# Patient Record
Sex: MALE | Race: WHITE | NOT HISPANIC OR LATINO | Employment: FULL TIME | URBAN - METROPOLITAN AREA
[De-identification: names, ages, dates, MRNs, and addresses within clinical notes are randomized per-mention and may not be internally consistent; named-entity substitution may affect disease eponyms.]

---

## 2017-03-29 ENCOUNTER — TRANSCRIBE ORDERS (OUTPATIENT)
Dept: ADMINISTRATIVE | Facility: HOSPITAL | Age: 72
End: 2017-03-29

## 2017-03-29 DIAGNOSIS — S06.0X0A: Primary | ICD-10-CM

## 2017-04-14 ENCOUNTER — HOSPITAL ENCOUNTER (OUTPATIENT)
Dept: MRI IMAGING | Facility: HOSPITAL | Age: 72
Discharge: HOME | End: 2017-04-14
Payer: COMMERCIAL

## 2017-04-14 DIAGNOSIS — S06.0X0A: ICD-10-CM

## 2017-04-14 PROCEDURE — 70551 MRI BRAIN STEM W/O DYE: CPT

## 2017-08-01 ENCOUNTER — ALLSCRIPTS OFFICE VISIT (OUTPATIENT)
Dept: OTHER | Facility: OTHER | Age: 72
End: 2017-08-01

## 2017-08-18 ENCOUNTER — APPOINTMENT (EMERGENCY)
Dept: CT IMAGING | Facility: HOSPITAL | Age: 72
End: 2017-08-18
Payer: COMMERCIAL

## 2017-08-18 ENCOUNTER — HOSPITAL ENCOUNTER (EMERGENCY)
Facility: HOSPITAL | Age: 72
Discharge: HOME | End: 2017-08-18
Attending: EMERGENCY MEDICINE
Payer: COMMERCIAL

## 2017-08-18 VITALS
SYSTOLIC BLOOD PRESSURE: 158 MMHG | DIASTOLIC BLOOD PRESSURE: 96 MMHG | TEMPERATURE: 97.6 F | WEIGHT: 195 LBS | RESPIRATION RATE: 20 BRPM | BODY MASS INDEX: 27.2 KG/M2 | HEART RATE: 62 BPM | OXYGEN SATURATION: 98 %

## 2017-08-18 DIAGNOSIS — G43.109 MIGRAINE WITH AURA: Primary | ICD-10-CM

## 2017-08-18 LAB
ALBUMIN SERPL BCP-MCNC: 3.8 G/DL (ref 3.5–5)
ALP SERPL-CCNC: 149 U/L (ref 46–116)
ALT SERPL W P-5'-P-CCNC: 28 U/L (ref 12–78)
ANION GAP SERPL CALCULATED.3IONS-SCNC: 11 MMOL/L (ref 4–13)
AST SERPL W P-5'-P-CCNC: 15 U/L (ref 5–45)
BASOPHILS # BLD AUTO: 0.06 THOUSANDS/ΜL (ref 0–0.1)
BASOPHILS NFR BLD AUTO: 1 % (ref 0–1)
BILIRUB SERPL-MCNC: 1.1 MG/DL (ref 0.2–1)
BUN SERPL-MCNC: 10 MG/DL (ref 5–25)
CALCIUM SERPL-MCNC: 9.6 MG/DL (ref 8.3–10.1)
CHLORIDE SERPL-SCNC: 103 MMOL/L (ref 100–108)
CO2 SERPL-SCNC: 26 MMOL/L (ref 21–32)
CREAT SERPL-MCNC: 1.01 MG/DL (ref 0.6–1.3)
EOSINOPHIL # BLD AUTO: 0.34 THOUSAND/ΜL (ref 0–0.61)
EOSINOPHIL NFR BLD AUTO: 4 % (ref 0–6)
ERYTHROCYTE [DISTWIDTH] IN BLOOD BY AUTOMATED COUNT: 11.9 % (ref 11.6–15.1)
GFR SERPL CREATININE-BSD FRML MDRD: 75 ML/MIN/1.73SQ M
GLUCOSE SERPL-MCNC: 93 MG/DL (ref 65–140)
HCT VFR BLD AUTO: 44.5 % (ref 36.5–49.3)
HGB BLD-MCNC: 15.4 G/DL (ref 12–17)
LYMPHOCYTES # BLD AUTO: 1.84 THOUSANDS/ΜL (ref 0.6–4.47)
LYMPHOCYTES NFR BLD AUTO: 24 % (ref 14–44)
MCH RBC QN AUTO: 32.3 PG (ref 26.8–34.3)
MCHC RBC AUTO-ENTMCNC: 34.6 G/DL (ref 31.4–37.4)
MCV RBC AUTO: 93 FL (ref 82–98)
MONOCYTES # BLD AUTO: 0.69 THOUSAND/ΜL (ref 0.17–1.22)
MONOCYTES NFR BLD AUTO: 9 % (ref 4–12)
NEUTROPHILS # BLD AUTO: 4.73 THOUSANDS/ΜL (ref 1.85–7.62)
NEUTS SEG NFR BLD AUTO: 62 % (ref 43–75)
NRBC BLD AUTO-RTO: 0 /100 WBCS
PLATELET # BLD AUTO: 186 THOUSANDS/UL (ref 149–390)
PMV BLD AUTO: 9.9 FL (ref 8.9–12.7)
POTASSIUM SERPL-SCNC: 4 MMOL/L (ref 3.5–5.3)
PROT SERPL-MCNC: 7.9 G/DL (ref 6.4–8.2)
RBC # BLD AUTO: 4.77 MILLION/UL (ref 3.88–5.62)
SODIUM SERPL-SCNC: 140 MMOL/L (ref 136–145)
TROPONIN I SERPL-MCNC: <0.02 NG/ML
WBC # BLD AUTO: 7.69 THOUSAND/UL (ref 4.31–10.16)

## 2017-08-18 PROCEDURE — 99285 EMERGENCY DEPT VISIT HI MDM: CPT

## 2017-08-18 PROCEDURE — 36415 COLL VENOUS BLD VENIPUNCTURE: CPT

## 2017-08-18 PROCEDURE — 96375 TX/PRO/DX INJ NEW DRUG ADDON: CPT

## 2017-08-18 PROCEDURE — 85025 COMPLETE CBC W/AUTO DIFF WBC: CPT | Performed by: EMERGENCY MEDICINE

## 2017-08-18 PROCEDURE — 70450 CT HEAD/BRAIN W/O DYE: CPT

## 2017-08-18 PROCEDURE — 80053 COMPREHEN METABOLIC PANEL: CPT | Performed by: EMERGENCY MEDICINE

## 2017-08-18 PROCEDURE — 93005 ELECTROCARDIOGRAM TRACING: CPT | Performed by: EMERGENCY MEDICINE

## 2017-08-18 PROCEDURE — 96361 HYDRATE IV INFUSION ADD-ON: CPT

## 2017-08-18 PROCEDURE — 96374 THER/PROPH/DIAG INJ IV PUSH: CPT

## 2017-08-18 PROCEDURE — 84484 ASSAY OF TROPONIN QUANT: CPT | Performed by: EMERGENCY MEDICINE

## 2017-08-18 RX ORDER — DEXTROAMPHETAMINE SACCHARATE, AMPHETAMINE ASPARTATE, DEXTROAMPHETAMINE SULFATE AND AMPHETAMINE SULFATE 2.5; 2.5; 2.5; 2.5 MG/1; MG/1; MG/1; MG/1
10 TABLET ORAL
COMMUNITY
End: 2018-05-01

## 2017-08-18 RX ORDER — METOCLOPRAMIDE HYDROCHLORIDE 5 MG/ML
10 INJECTION INTRAMUSCULAR; INTRAVENOUS ONCE
Status: COMPLETED | OUTPATIENT
Start: 2017-08-18 | End: 2017-08-18

## 2017-08-18 RX ORDER — DIPHENHYDRAMINE HYDROCHLORIDE 50 MG/ML
12.5 INJECTION INTRAMUSCULAR; INTRAVENOUS ONCE
Status: COMPLETED | OUTPATIENT
Start: 2017-08-18 | End: 2017-08-18

## 2017-08-18 RX ORDER — DONEPEZIL HYDROCHLORIDE 10 MG/1
10 TABLET, FILM COATED ORAL
COMMUNITY
End: 2018-05-01

## 2017-08-18 RX ORDER — ONDANSETRON 4 MG/1
4 TABLET, ORALLY DISINTEGRATING ORAL EVERY 6 HOURS PRN
COMMUNITY
End: 2018-05-01

## 2017-08-18 RX ADMIN — METOCLOPRAMIDE 10 MG: 5 INJECTION, SOLUTION INTRAMUSCULAR; INTRAVENOUS at 12:46

## 2017-08-18 RX ADMIN — SODIUM CHLORIDE 1000 ML: 0.9 INJECTION, SOLUTION INTRAVENOUS at 12:46

## 2017-08-18 RX ADMIN — DIPHENHYDRAMINE HYDROCHLORIDE 12.5 MG: 50 INJECTION, SOLUTION INTRAMUSCULAR; INTRAVENOUS at 12:46

## 2017-08-22 LAB
ATRIAL RATE: 64 BPM
P AXIS: 46 DEGREES
PR INTERVAL: 194 MS
QRS AXIS: 29 DEGREES
QRSD INTERVAL: 76 MS
QT INTERVAL: 416 MS
QTC INTERVAL: 429 MS
T WAVE AXIS: 59 DEGREES
VENTRICULAR RATE: 64 BPM

## 2017-08-24 ENCOUNTER — GENERIC CONVERSION - ENCOUNTER (OUTPATIENT)
Dept: OTHER | Facility: OTHER | Age: 72
End: 2017-08-24

## 2017-08-25 ENCOUNTER — ALLSCRIPTS OFFICE VISIT (OUTPATIENT)
Dept: OTHER | Facility: OTHER | Age: 72
End: 2017-08-25

## 2017-09-14 ENCOUNTER — GENERIC CONVERSION - ENCOUNTER (OUTPATIENT)
Dept: OTHER | Facility: OTHER | Age: 72
End: 2017-09-14

## 2017-10-24 ENCOUNTER — ALLSCRIPTS OFFICE VISIT (OUTPATIENT)
Dept: OTHER | Facility: OTHER | Age: 72
End: 2017-10-24

## 2017-12-04 ENCOUNTER — ALLSCRIPTS OFFICE VISIT (OUTPATIENT)
Dept: OTHER | Facility: OTHER | Age: 72
End: 2017-12-04
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
## 2017-12-13 ENCOUNTER — TRANSCRIBE ORDERS (OUTPATIENT)
Dept: ADMINISTRATIVE | Facility: HOSPITAL | Age: 72
End: 2017-12-13

## 2017-12-13 DIAGNOSIS — G47.00 INSOMNIA WITH SLEEP APNEA: ICD-10-CM

## 2017-12-13 DIAGNOSIS — G47.30 INSOMNIA WITH SLEEP APNEA: ICD-10-CM

## 2017-12-13 DIAGNOSIS — G47.00 PERSISTENT DISORDER OF INITIATING OR MAINTAINING SLEEP: Primary | ICD-10-CM

## 2017-12-22 ENCOUNTER — ALLSCRIPTS OFFICE VISIT (OUTPATIENT)
Dept: OTHER | Facility: OTHER | Age: 72
End: 2017-12-22

## 2018-01-09 NOTE — MISCELLANEOUS
Message  Dr Oswaldo Salgado ([phone]) 1  reach out to me today on the behalf of the patient  She indicates that he has been having more anxiety recently as well as overstimulation from sounds and other external stimuli  Patient has a low tolerance for distressed and also has some dizziness at times when he closes his eyes that has been preceding the start of the medication with me  She is concerned that some of this is not her logic that may be related to his trauma and psychiatric illness  I thanked her for the conversation and asked that she contact me again if any further concerns arise and I appreciate the dialogue being opened  Plan: I will reach out to the patient and discuss possible options with medication adjustments  1 Amended By: Alexandrea Jones;  Sep 14 2017 1:35 PM EST    Signatures   Electronically signed by : Alexandrea Jones DO; Sep 14 2017  1:35PM EST                       (Author)
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
## 2018-01-13 NOTE — MISCELLANEOUS
Message  LVM for Dr Antoni Marsh (sp?) at [phone]      Plan  Acute adjustment disorder with mixed anxiety and depressed mood    · FLUoxetine HCl - 10 MG Oral Capsule (PROzac); TAKE 1 CAPSULES DAILY   · Prazosin HCl - 1 MG Oral Capsule; Take 1 capsule nightly  May increase dose  every 3-4 nights by 1 capsule   Do not exceed 3 capsules (3mg) nightly    Signatures   Electronically signed by : Dannial Dandy, DO; Sep 13 2017  3:04PM EST                       (Author)

## 2018-01-14 VITALS
DIASTOLIC BLOOD PRESSURE: 102 MMHG | HEART RATE: 63 BPM | WEIGHT: 199 LBS | SYSTOLIC BLOOD PRESSURE: 155 MMHG | BODY MASS INDEX: 28.55 KG/M2

## 2018-01-14 VITALS — DIASTOLIC BLOOD PRESSURE: 108 MMHG | OXYGEN SATURATION: 99 % | SYSTOLIC BLOOD PRESSURE: 174 MMHG | HEART RATE: 61 BPM

## 2018-01-14 VITALS — DIASTOLIC BLOOD PRESSURE: 89 MMHG | SYSTOLIC BLOOD PRESSURE: 143 MMHG | HEART RATE: 69 BPM

## 2018-01-16 NOTE — MISCELLANEOUS
Message  Sebastien medication he has been having some more anxiety attacks and anxiety overall lately  He says that he would like to increase the Prozac and we agreed to this plan  He is only taking prazosin 1 mg nightly  He did not increase the dose  He will not increase the same time with medication changes but will maybe do that before next visit  He has a trip to [place] coming up and he wanted to make sure he had good anxiety support  Thus his PCP with [facility] prescribed him low-dose Xanax  I talked about side effects risks interactions and benefits of this medication is well tolerated LD, sedation falls and other issues  Patient is in understanding of the risks with the medication and says he will use a judiciously for panic attack concerns  No side effects or issues otherwise the    P increased Prozac 20 mg daily  After about 1 week patient may increase prazosin as prescribed   Patient will take Xanax for panic attacks prescribed by other provider as needed      Plan  Acute adjustment disorder with mixed anxiety and depressed mood    · From  FLUoxetine HCl - 10 MG Oral Capsule TAKE 1 CAPSULES DAILY To  FLUoxetine HCl - 20 MG Oral Capsule (PROzac) take 1 capsule daily    Signatures   Electronically signed by : Dutch Hogan DO; Sep 14 2017  3:47PM EST                       (Author)

## 2018-01-17 NOTE — PSYCH
Behavioral Health Outpatient Intake    Referred By: DR Tanisha Selby  Intake Questions: there are no developmental disabilities  the patient does not have a hearing impairment  the patient does not have an ICM or CTT  patient is not taking injectable psychiatric medications  Employment: The patient is employed full time at [employer]  Emergency Contact Information:   Emergency Contact: DEYANIRA Astudillo   Relationship to Patient: WIFE   Phone Number: [phone]   Previous Psychiatric Treatment: He has previously been seen by a psychiatrist  ?, 3 YRS AGO  He has not been previously seen by a therapist     History: no  service  He has not had combat service  He was not activated into federal active duty as a member of the [email or web address] or [employer]  Insurance Subscriber: [employer]   Primary Insurance: [facility]   ID number: [id number]   Group number: [id number]         Presenting Problem (in patient's words): HAD CONCUSSION, POST CONCUSSION SYNDROME  Substance Abuse: NONE  Previous Treatment: The patient has not been seen here in the past      Accepted as Patient   DR Cortez Powers 8/25/17 @ 2PM [id number]     Primary Care Physician: DR Emmy Fox   Electronically signed by : Gemma Rich, ; Aug 24 2017  2:21PM EST                       (Author)

## 2018-01-22 VITALS
BODY MASS INDEX: 28.86 KG/M2 | HEIGHT: 70 IN | WEIGHT: 201.56 LBS | SYSTOLIC BLOOD PRESSURE: 140 MMHG | HEART RATE: 63 BPM | DIASTOLIC BLOOD PRESSURE: 88 MMHG

## 2018-01-23 NOTE — PSYCH
Treatment Plan Tracking    #1 Treatment Plan not completed within required time limits due to: Client cancelled/ no-showed scheduled appointment            Signatures   Electronically signed by : Alley Santos, ; Dec 26 2017  9:30AM EST                       (Author)

## 2018-03-12 ENCOUNTER — TELEPHONE (OUTPATIENT)
Dept: NEUROLOGY | Facility: CLINIC | Age: 73
End: 2018-03-12

## 2018-04-22 DIAGNOSIS — R41.3 AMNESIA/MEMORY DISORDER: Primary | ICD-10-CM

## 2018-04-23 RX ORDER — MEMANTINE HYDROCHLORIDE 10 MG/1
TABLET ORAL
Qty: 180 TABLET | Refills: 1 | Status: SHIPPED | OUTPATIENT
Start: 2018-04-23 | End: 2019-04-05

## 2018-04-27 ENCOUNTER — TELEPHONE (OUTPATIENT)
Dept: BEHAVIORAL/MENTAL HEALTH CLINIC | Facility: CLINIC | Age: 73
End: 2018-04-27
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
## 2018-05-01 ENCOUNTER — OFFICE VISIT (OUTPATIENT)
Dept: PSYCHIATRY | Facility: CLINIC | Age: 73
End: 2018-05-01
Payer: COMMERCIAL

## 2018-05-01 VITALS — HEART RATE: 67 BPM | DIASTOLIC BLOOD PRESSURE: 88 MMHG | SYSTOLIC BLOOD PRESSURE: 135 MMHG

## 2018-05-01 DIAGNOSIS — F43.23 ACUTE ADJUSTMENT DISORDER WITH MIXED ANXIETY AND DEPRESSED MOOD: ICD-10-CM

## 2018-05-01 DIAGNOSIS — R41.3 MEMORY DISTURBANCE: ICD-10-CM

## 2018-05-01 DIAGNOSIS — F07.81 POSTCONCUSSION SYNDROME: ICD-10-CM

## 2018-05-01 DIAGNOSIS — F43.10 PTSD (POST-TRAUMATIC STRESS DISORDER): Primary | ICD-10-CM

## 2018-05-01 PROCEDURE — 99214 OFFICE O/P EST MOD 30 MIN: CPT | Performed by: PSYCHIATRY & NEUROLOGY

## 2018-05-01 RX ORDER — PRAZOSIN HYDROCHLORIDE 1 MG/1
CAPSULE ORAL
Qty: 90 CAPSULE | Refills: 3 | Status: SHIPPED | OUTPATIENT
Start: 2018-05-01 | End: 2018-05-31

## 2018-05-01 RX ORDER — FLUOXETINE HYDROCHLORIDE 40 MG/1
40 CAPSULE ORAL DAILY
Qty: 90 CAPSULE | Refills: 1 | Status: SHIPPED | OUTPATIENT
Start: 2018-05-01 | End: 2018-05-17 | Stop reason: SDUPTHER

## 2018-05-08 ENCOUNTER — TELEPHONE (OUTPATIENT)
Dept: BEHAVIORAL/MENTAL HEALTH CLINIC | Facility: CLINIC | Age: 73
End: 2018-05-08

## 2018-05-08 NOTE — TELEPHONE ENCOUNTER
Left another message offering this date and time, asked him to call back if that works for him so we can schedule

## 2018-05-08 NOTE — TELEPHONE ENCOUNTER
Please put him on a cancellation list  Unfortunately, it may be that long till I can see him, but we will try to get him in sooner  The neurologist may have a cancellation list as well, make sure he is on it  I MAY be able to open my schedule early afternoon 5/18 (eg 2pm)  Ask him if he would be available at that time  If so, let him know that we will let him know next week if I can get him in, but be clear that he is not scheduled to be seen unless we confirm with him  Then task me back  Thanks!     Yandel James

## 2018-05-08 NOTE — TELEPHONE ENCOUNTER
Patient left message stating he tried to make an appointment with the Doctor you recommended for a memory test, but could not schedule until August  He says he does not want to wait that long and says he would like to do the test with you if possible

## 2018-05-09 NOTE — TELEPHONE ENCOUNTER
Maritza Jefferson has now taken that time slot on 5/16 :(    Have to go back to the original plan I discussed in prior message to you  Thanks!

## 2018-05-09 NOTE — TELEPHONE ENCOUNTER
I left a message offering patient the appointment for tomorrow 05/10/2018 at 1:30pm  Waiting on a call back

## 2018-05-09 NOTE — TELEPHONE ENCOUNTER
Lo Pandya is adjusting my AM due to a meeting  However, once she is complete, I am ok with Emma Roberts coming in tomorrow  Randy Nunes, once done with the AM rescheduling can you coordinate with César Webster so she can f/u with Emma Roberts (or call him directly yourself if you have time)? Thanks!   Jim Ferrell

## 2018-05-11 NOTE — TELEPHONE ENCOUNTER
Kirstie Olivo has been trying to get in with me  I see 5/17 has a green slot (I think 277a) - Please place him in there NOW so he does not lose it, then if he cannot come in we will re-open  Keep me posted please    Thanks!   Paddy Hendrix

## 2018-05-15 NOTE — TELEPHONE ENCOUNTER
Did this lindy verify his appoint for this week at 930am? You put him in for me, just want to make sure we have heard back from him  Please let me know  Thanks!

## 2018-05-16 ENCOUNTER — TELEPHONE (OUTPATIENT)
Dept: PSYCHIATRY | Facility: CLINIC | Age: 73
End: 2018-05-16

## 2018-05-16 NOTE — TELEPHONE ENCOUNTER
I called him and left a him a message stating he has an appointment tomorrow at 9:30AM  I asked for him to call the office if he can or cannot make this appointment

## 2018-05-17 ENCOUNTER — TELEPHONE (OUTPATIENT)
Dept: PSYCHIATRY | Facility: CLINIC | Age: 73
End: 2018-05-17

## 2018-05-17 ENCOUNTER — OFFICE VISIT (OUTPATIENT)
Dept: PSYCHIATRY | Facility: CLINIC | Age: 73
End: 2018-05-17
Payer: COMMERCIAL

## 2018-05-17 DIAGNOSIS — Z79.899 HIGH RISK MEDICATION USE: ICD-10-CM

## 2018-05-17 DIAGNOSIS — F43.10 PTSD (POST-TRAUMATIC STRESS DISORDER): Primary | ICD-10-CM

## 2018-05-17 DIAGNOSIS — F32.2 MDD (MAJOR DEPRESSIVE DISORDER), SINGLE EPISODE, SEVERE , NO PSYCHOSIS (HCC): ICD-10-CM

## 2018-05-17 DIAGNOSIS — F43.23 ACUTE ADJUSTMENT DISORDER WITH MIXED ANXIETY AND DEPRESSED MOOD: ICD-10-CM

## 2018-05-17 PROCEDURE — 99214 OFFICE O/P EST MOD 30 MIN: CPT | Performed by: PSYCHIATRY & NEUROLOGY

## 2018-05-17 PROCEDURE — 90833 PSYTX W PT W E/M 30 MIN: CPT | Performed by: PSYCHIATRY & NEUROLOGY

## 2018-05-17 RX ORDER — ARIPIPRAZOLE 5 MG/1
TABLET ORAL
Qty: 30 TABLET | Refills: 3 | Status: SHIPPED | OUTPATIENT
Start: 2018-05-17 | End: 2018-05-31 | Stop reason: SDUPTHER

## 2018-05-17 RX ORDER — FLUOXETINE HYDROCHLORIDE 20 MG/1
20 CAPSULE ORAL DAILY
Qty: 30 CAPSULE | Refills: 2 | Status: SHIPPED | OUTPATIENT
Start: 2018-05-17 | End: 2018-05-31 | Stop reason: SDUPTHER

## 2018-05-17 NOTE — TELEPHONE ENCOUNTER
----- Message from Noel Alvarez III, DO sent at 5/17/2018 12:08 PM EDT -----  This patient  Thanks!     Therapy and f/u with me at 1030 like noted in other message
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
## 2018-05-31 ENCOUNTER — OFFICE VISIT (OUTPATIENT)
Dept: PSYCHIATRY | Facility: CLINIC | Age: 73
End: 2018-05-31
Payer: COMMERCIAL

## 2018-05-31 VITALS
DIASTOLIC BLOOD PRESSURE: 99 MMHG | WEIGHT: 200 LBS | SYSTOLIC BLOOD PRESSURE: 156 MMHG | HEART RATE: 65 BPM | BODY MASS INDEX: 28.7 KG/M2

## 2018-05-31 DIAGNOSIS — F43.10 PTSD (POST-TRAUMATIC STRESS DISORDER): ICD-10-CM

## 2018-05-31 DIAGNOSIS — R41.3 MEMORY DISTURBANCE: ICD-10-CM

## 2018-05-31 DIAGNOSIS — F43.23 ACUTE ADJUSTMENT DISORDER WITH MIXED ANXIETY AND DEPRESSED MOOD: ICD-10-CM

## 2018-05-31 DIAGNOSIS — F07.81 POSTCONCUSSION SYNDROME: ICD-10-CM

## 2018-05-31 DIAGNOSIS — F32.2 MDD (MAJOR DEPRESSIVE DISORDER), SINGLE EPISODE, SEVERE , NO PSYCHOSIS (HCC): Primary | ICD-10-CM

## 2018-05-31 PROCEDURE — 99214 OFFICE O/P EST MOD 30 MIN: CPT | Performed by: PSYCHIATRY & NEUROLOGY

## 2018-05-31 PROCEDURE — 90833 PSYTX W PT W E/M 30 MIN: CPT | Performed by: PSYCHIATRY & NEUROLOGY

## 2018-05-31 RX ORDER — ARIPIPRAZOLE 5 MG/1
TABLET ORAL
Qty: 30 TABLET | Refills: 3 | Status: SHIPPED | OUTPATIENT
Start: 2018-05-31 | End: 2018-08-06 | Stop reason: SDUPTHER

## 2018-05-31 RX ORDER — CLONIDINE HYDROCHLORIDE 0.1 MG/1
TABLET ORAL
Qty: 60 TABLET | Refills: 3 | Status: SHIPPED | OUTPATIENT
Start: 2018-05-31 | End: 2018-10-04 | Stop reason: SDUPTHER

## 2018-05-31 RX ORDER — FLUOXETINE HYDROCHLORIDE 20 MG/1
20 CAPSULE ORAL DAILY
Qty: 30 CAPSULE | Refills: 1 | Status: SHIPPED | OUTPATIENT
Start: 2018-05-31 | End: 2018-08-06 | Stop reason: SDUPTHER

## 2018-06-26 ENCOUNTER — TELEPHONE (OUTPATIENT)
Dept: PSYCHIATRY | Facility: CLINIC | Age: 73
End: 2018-06-26

## 2018-06-26 NOTE — TELEPHONE ENCOUNTER
JEROME and by number for Sebastien to call and give an update on his condition  Message   Received: Today   Message Contents   DO Melody Roberts III, DIVINA             Good morning,     See below  Could you just call him, make sure he is doing better  Our next appointment is not until 8/20  Thanks,   KIM    Previous Messages      ----- Message -----   From: Angy Barrios MA   Sent: 6/25/2018   8:43 AM   To: Dung Fulton III, DO     Pt called and canceled rachid't he stated that he has a rachid't else where

## 2018-06-30 ENCOUNTER — HOSPITAL ENCOUNTER (INPATIENT)
Facility: HOSPITAL | Age: 73
LOS: 3 days | Discharge: HOME | DRG: 247 | End: 2018-07-03
Attending: EMERGENCY MEDICINE | Admitting: INTERNAL MEDICINE
Payer: COMMERCIAL

## 2018-06-30 ENCOUNTER — APPOINTMENT (EMERGENCY)
Dept: RADIOLOGY | Facility: HOSPITAL | Age: 73
DRG: 247 | End: 2018-06-30
Payer: COMMERCIAL

## 2018-06-30 DIAGNOSIS — I21.4 NSTEMI, INITIAL EPISODE OF CARE (HCC): Primary | ICD-10-CM

## 2018-06-30 PROBLEM — I10 ESSENTIAL HYPERTENSION: Status: ACTIVE | Noted: 2018-06-30

## 2018-06-30 LAB
ALBUMIN SERPL BCP-MCNC: 3.6 G/DL (ref 3.5–5)
ALP SERPL-CCNC: 157 U/L (ref 46–116)
ALT SERPL W P-5'-P-CCNC: 30 U/L (ref 12–78)
ANION GAP SERPL CALCULATED.3IONS-SCNC: 9 MMOL/L (ref 4–13)
APTT PPP: 35 SECONDS (ref 24–36)
APTT PPP: 81 SECONDS (ref 24–36)
AST SERPL W P-5'-P-CCNC: 22 U/L (ref 5–45)
BASOPHILS # BLD AUTO: 0.05 THOUSANDS/ΜL (ref 0–0.1)
BASOPHILS NFR BLD AUTO: 1 % (ref 0–1)
BILIRUB SERPL-MCNC: 0.8 MG/DL (ref 0.2–1)
BUN SERPL-MCNC: 12 MG/DL (ref 5–25)
CALCIUM SERPL-MCNC: 8.9 MG/DL (ref 8.3–10.1)
CHLORIDE SERPL-SCNC: 104 MMOL/L (ref 100–108)
CO2 SERPL-SCNC: 26 MMOL/L (ref 21–32)
CREAT SERPL-MCNC: 0.88 MG/DL (ref 0.6–1.3)
EOSINOPHIL # BLD AUTO: 0.53 THOUSAND/ΜL (ref 0–0.61)
EOSINOPHIL NFR BLD AUTO: 7 % (ref 0–6)
ERYTHROCYTE [DISTWIDTH] IN BLOOD BY AUTOMATED COUNT: 12.4 % (ref 11.6–15.1)
GFR SERPL CREATININE-BSD FRML MDRD: 86 ML/MIN/1.73SQ M
GLUCOSE SERPL-MCNC: 106 MG/DL (ref 65–140)
HCT VFR BLD AUTO: 41.1 % (ref 36.5–49.3)
HGB BLD-MCNC: 14.2 G/DL (ref 12–17)
INR PPP: 0.93 (ref 0.86–1.17)
LYMPHOCYTES # BLD AUTO: 2.05 THOUSANDS/ΜL (ref 0.6–4.47)
LYMPHOCYTES NFR BLD AUTO: 26 % (ref 14–44)
MCH RBC QN AUTO: 32.8 PG (ref 26.8–34.3)
MCHC RBC AUTO-ENTMCNC: 34.5 G/DL (ref 31.4–37.4)
MCV RBC AUTO: 95 FL (ref 82–98)
MONOCYTES # BLD AUTO: 0.97 THOUSAND/ΜL (ref 0.17–1.22)
MONOCYTES NFR BLD AUTO: 12 % (ref 4–12)
NEUTROPHILS # BLD AUTO: 4.29 THOUSANDS/ΜL (ref 1.85–7.62)
NEUTS SEG NFR BLD AUTO: 54 % (ref 43–75)
PLATELET # BLD AUTO: 175 THOUSANDS/UL (ref 149–390)
PMV BLD AUTO: 10.5 FL (ref 8.9–12.7)
POTASSIUM SERPL-SCNC: 3.5 MMOL/L (ref 3.5–5.3)
PROT SERPL-MCNC: 7.4 G/DL (ref 6.4–8.2)
PROTHROMBIN TIME: 12.2 SECONDS (ref 11.8–14.2)
RBC # BLD AUTO: 4.33 MILLION/UL (ref 3.88–5.62)
SODIUM SERPL-SCNC: 139 MMOL/L (ref 136–145)
TROPONIN I SERPL-MCNC: 0.12 NG/ML
TROPONIN I SERPL-MCNC: 0.15 NG/ML
TROPONIN I SERPL-MCNC: 0.16 NG/ML
WBC # BLD AUTO: 7.89 THOUSAND/UL (ref 4.31–10.16)

## 2018-06-30 PROCEDURE — 80053 COMPREHEN METABOLIC PANEL: CPT | Performed by: PHYSICIAN ASSISTANT

## 2018-06-30 PROCEDURE — 84484 ASSAY OF TROPONIN QUANT: CPT | Performed by: PHYSICIAN ASSISTANT

## 2018-06-30 PROCEDURE — 71046 X-RAY EXAM CHEST 2 VIEWS: CPT

## 2018-06-30 PROCEDURE — 85610 PROTHROMBIN TIME: CPT | Performed by: PHYSICIAN ASSISTANT

## 2018-06-30 PROCEDURE — 99285 EMERGENCY DEPT VISIT HI MDM: CPT

## 2018-06-30 PROCEDURE — 85730 THROMBOPLASTIN TIME PARTIAL: CPT | Performed by: INTERNAL MEDICINE

## 2018-06-30 PROCEDURE — 84484 ASSAY OF TROPONIN QUANT: CPT | Performed by: INTERNAL MEDICINE

## 2018-06-30 PROCEDURE — 36415 COLL VENOUS BLD VENIPUNCTURE: CPT | Performed by: PHYSICIAN ASSISTANT

## 2018-06-30 PROCEDURE — 93005 ELECTROCARDIOGRAM TRACING: CPT

## 2018-06-30 PROCEDURE — 99223 1ST HOSP IP/OBS HIGH 75: CPT | Performed by: INTERNAL MEDICINE

## 2018-06-30 PROCEDURE — 85025 COMPLETE CBC W/AUTO DIFF WBC: CPT | Performed by: PHYSICIAN ASSISTANT

## 2018-06-30 PROCEDURE — 85730 THROMBOPLASTIN TIME PARTIAL: CPT | Performed by: PHYSICIAN ASSISTANT

## 2018-06-30 RX ORDER — ARIPIPRAZOLE 5 MG/1
5 TABLET ORAL DAILY
Status: DISCONTINUED | OUTPATIENT
Start: 2018-07-01 | End: 2018-07-03 | Stop reason: HOSPADM

## 2018-06-30 RX ORDER — ASPIRIN 81 MG/1
81 TABLET, CHEWABLE ORAL DAILY
Status: DISCONTINUED | OUTPATIENT
Start: 2018-07-01 | End: 2018-07-03 | Stop reason: HOSPADM

## 2018-06-30 RX ORDER — ACETAMINOPHEN 325 MG/1
975 TABLET ORAL ONCE
Status: COMPLETED | OUTPATIENT
Start: 2018-06-30 | End: 2018-06-30

## 2018-06-30 RX ORDER — HEPARIN SODIUM 10000 [USP'U]/100ML
3-20 INJECTION, SOLUTION INTRAVENOUS
Status: DISCONTINUED | OUTPATIENT
Start: 2018-06-30 | End: 2018-07-03 | Stop reason: HOSPADM

## 2018-06-30 RX ORDER — NITROGLYCERIN 0.4 MG/1
0.4 TABLET SUBLINGUAL ONCE
Status: COMPLETED | OUTPATIENT
Start: 2018-06-30 | End: 2018-06-30

## 2018-06-30 RX ORDER — METOPROLOL TARTRATE 5 MG/5ML
5 INJECTION INTRAVENOUS EVERY 6 HOURS PRN
Status: DISCONTINUED | OUTPATIENT
Start: 2018-06-30 | End: 2018-07-03 | Stop reason: HOSPADM

## 2018-06-30 RX ORDER — ONDANSETRON 2 MG/ML
4 INJECTION INTRAMUSCULAR; INTRAVENOUS EVERY 4 HOURS PRN
Status: DISCONTINUED | OUTPATIENT
Start: 2018-06-30 | End: 2018-07-03 | Stop reason: HOSPADM

## 2018-06-30 RX ORDER — NITROGLYCERIN 0.4 MG/1
0.4 TABLET SUBLINGUAL
Status: DISCONTINUED | OUTPATIENT
Start: 2018-06-30 | End: 2018-07-03 | Stop reason: HOSPADM

## 2018-06-30 RX ORDER — MEMANTINE HYDROCHLORIDE 10 MG/1
10 TABLET ORAL 2 TIMES DAILY
Status: DISCONTINUED | OUTPATIENT
Start: 2018-06-30 | End: 2018-07-03 | Stop reason: HOSPADM

## 2018-06-30 RX ORDER — ACETAMINOPHEN 325 MG/1
650 TABLET ORAL EVERY 6 HOURS PRN
Status: DISCONTINUED | OUTPATIENT
Start: 2018-06-30 | End: 2018-07-03 | Stop reason: HOSPADM

## 2018-06-30 RX ORDER — HEPARIN SODIUM 1000 [USP'U]/ML
4000 INJECTION, SOLUTION INTRAVENOUS; SUBCUTANEOUS AS NEEDED
Status: DISCONTINUED | OUTPATIENT
Start: 2018-06-30 | End: 2018-07-03 | Stop reason: HOSPADM

## 2018-06-30 RX ORDER — FLUOXETINE HYDROCHLORIDE 20 MG/1
20 CAPSULE ORAL DAILY
Status: DISCONTINUED | OUTPATIENT
Start: 2018-07-01 | End: 2018-07-03 | Stop reason: HOSPADM

## 2018-06-30 RX ORDER — HEPARIN SODIUM 1000 [USP'U]/ML
2000 INJECTION, SOLUTION INTRAVENOUS; SUBCUTANEOUS AS NEEDED
Status: DISCONTINUED | OUTPATIENT
Start: 2018-06-30 | End: 2018-07-03 | Stop reason: HOSPADM

## 2018-06-30 RX ORDER — HEPARIN SODIUM 1000 [USP'U]/ML
4000 INJECTION, SOLUTION INTRAVENOUS; SUBCUTANEOUS ONCE
Status: COMPLETED | OUTPATIENT
Start: 2018-06-30 | End: 2018-06-30

## 2018-06-30 RX ORDER — ASPIRIN 81 MG/1
162 TABLET, CHEWABLE ORAL ONCE
Status: COMPLETED | OUTPATIENT
Start: 2018-06-30 | End: 2018-06-30

## 2018-06-30 RX ORDER — ATORVASTATIN CALCIUM 40 MG/1
40 TABLET, FILM COATED ORAL
Status: DISCONTINUED | OUTPATIENT
Start: 2018-06-30 | End: 2018-07-03 | Stop reason: HOSPADM

## 2018-06-30 RX ADMIN — ACETAMINOPHEN 650 MG: 325 TABLET, FILM COATED ORAL at 21:43

## 2018-06-30 RX ADMIN — ACETAMINOPHEN 975 MG: 325 TABLET, FILM COATED ORAL at 16:23

## 2018-06-30 RX ADMIN — METOPROLOL TARTRATE 25 MG: 25 TABLET, FILM COATED ORAL at 21:43

## 2018-06-30 RX ADMIN — NITROGLYCERIN 0.4 MG: 0.4 TABLET SUBLINGUAL at 15:00

## 2018-06-30 RX ADMIN — HEPARIN SODIUM 4000 UNITS: 1000 INJECTION, SOLUTION INTRAVENOUS; SUBCUTANEOUS at 16:32

## 2018-06-30 RX ADMIN — HEPARIN SODIUM AND DEXTROSE 11.1 UNITS/KG/HR: 10000; 5 INJECTION INTRAVENOUS at 16:32

## 2018-06-30 RX ADMIN — ATORVASTATIN CALCIUM 40 MG: 40 TABLET, FILM COATED ORAL at 18:30

## 2018-06-30 RX ADMIN — MEMANTINE 10 MG: 10 TABLET ORAL at 18:30

## 2018-06-30 RX ADMIN — ASPIRIN 162 MG: 81 TABLET, CHEWABLE ORAL at 15:00

## 2018-06-30 RX ADMIN — NITROGLYCERIN 1 INCH: 20 OINTMENT TOPICAL at 16:23

## 2018-06-30 NOTE — ASSESSMENT & PLAN NOTE
low-sodium diet heavily encouraged  initiated beta-blockade with Lopressor in light of NSTEMI   Also continue cataprese on prior dose

## 2018-06-30 NOTE — LETTER
[facility]  [address] [address]  Dept: [phone]    July 3, 2018     Patient: Tam Tinajero   YOB: 1945   Date of Visit: 6/30/2018       To Whom it May Concern:    Heidy Delgado is under my professional care  He was seen in the hospital from 6/30/2018   to 07/03/18  He may return to work on Monday the 9th of July       If you have any questions or concerns, please don't hesitate to call           Sincerely,          Shara Oates MD

## 2018-06-30 NOTE — ED PROVIDER NOTES
History  Chief Complaint   Patient presents with    Chest Pain     pt reports moving furniture and feeling SOB, shortly after started with midsternal/left sided chest squeezing and left arm numbness and tingling, pt diaphoretic, took 2 ASA PTA, 8/10 pain     72-year-old male presents to the emergency department with complaints of chest pain on exertion  States that he has been moving furniture over the past 4 days and today started with shortness of breath and left-sided chest pressure  States the pain radiated into his left arm and he also got diaphoretic and lightheaded  No nausea  States that he took 2 low-dose aspirin prior to arrival   States that symptoms started approximately 2-2 and 0 5 hours ago  States that he had similar symptoms approximately 2 days ago which were not as intense and did not last as long  States that these are also relieved with rest   Patient has a history of hypertension which she does not currently take medication for  States that he was seen by his cardiologist over this past week and was noted to have mildly elevated blood pressure  States that he has a stress test scheduled for this upcoming week  History provided by:  Patient   used: No        Prior to Admission Medications   Prescriptions Last Dose Informant Patient Reported? Taking? ARIPiprazole (ABILIFY) 5 mg tablet 2018 at Unknown time  No Yes   Si tab daily  FLUoxetine (PROzac) 20 mg capsule 2018 at Unknown time  No Yes   Sig: Take 1 capsule (20 mg total) by mouth daily   aspirin 81 MG tablet 2018 at Unknown time  Yes Yes   Sig: Take 81 mg by mouth   cloNIDine (CATAPRES) 0 1 mg tablet 2018 at Unknown time  No Yes   Sig: Take 0 1mg nightly  After 4-5 days, may increase to 0 2mg nightly   memantine (NAMENDA) 10 mg tablet 2018 at Unknown time  No Yes   Sig: TAKE 1 TABLET TWICE DAILY FOR MEMORY        Facility-Administered Medications: None       Past Medical History:   Diagnosis Date    Concussion     Hypertension        History reviewed  No pertinent surgical history  Family History   Problem Relation Age of Onset    Family history unknown: Yes     I have reviewed and agree with the history as documented  Social History   Substance Use Topics    Smoking status: Never Smoker    Smokeless tobacco: Never Used    Alcohol use 7 2 oz/week     12 Cans of beer per week      Comment: 1 bottle of wine a week        Review of Systems   Constitutional: Negative for activity change, appetite change, chills and fever  HENT: Negative for congestion, dental problem, drooling, ear discharge, ear pain, mouth sores, nosebleeds, rhinorrhea, sore throat and trouble swallowing  Eyes: Negative for pain, discharge and itching  Respiratory: Positive for shortness of breath  Negative for cough, chest tightness and wheezing  Cardiovascular: Positive for chest pain  Negative for palpitations  Gastrointestinal: Positive for nausea  Negative for abdominal pain, blood in stool, constipation, diarrhea and vomiting  Endocrine: Negative for cold intolerance and heat intolerance  Genitourinary: Negative for difficulty urinating, dysuria, flank pain, frequency and urgency  Skin: Negative for rash and wound  Allergic/Immunologic: Negative for food allergies and immunocompromised state  Neurological: Positive for light-headedness  Negative for dizziness, seizures, syncope, weakness, numbness and headaches  Psychiatric/Behavioral: Negative for agitation, behavioral problems and confusion  Physical Exam  Physical Exam   Constitutional: He is oriented to person, place, and time  He appears well-developed and well-nourished  No distress  HENT:   Head: Normocephalic and atraumatic  Right Ear: External ear normal    Left Ear: External ear normal    Mouth/Throat: Oropharynx is clear and moist  No oropharyngeal exudate     Eyes: Conjunctivae are normal    Neck: No JVD present  No tracheal deviation present  Cardiovascular: Normal rate, regular rhythm and normal heart sounds  Exam reveals no gallop and no friction rub  No murmur heard  Pulmonary/Chest: Effort normal and breath sounds normal  No respiratory distress  He has no wheezes  He has no rales  He exhibits no tenderness  Abdominal: Soft  Bowel sounds are normal  He exhibits no distension  There is no tenderness  There is no guarding  Musculoskeletal: Normal range of motion  He exhibits no edema, tenderness or deformity  Lymphadenopathy:     He has no cervical adenopathy  Neurological: He is alert and oriented to person, place, and time  Skin: Skin is warm and dry  No rash noted  He is not diaphoretic  No erythema  Psychiatric: He has a normal mood and affect  His behavior is normal    Nursing note and vitals reviewed        Vital Signs  ED Triage Vitals [06/30/18 1329]   Temp Pulse Respirations Blood Pressure SpO2   -- 62 20 (!) 187/109 98 %      Temp src Heart Rate Source Patient Position - Orthostatic VS BP Location FiO2 (%)   -- Monitor Sitting Right arm --      Pain Score       8           Vitals:    06/30/18 1400 06/30/18 1415 06/30/18 1430 06/30/18 1502   BP: 156/95  (!) 174/97 166/95   Pulse: 60 60 62 63   Patient Position - Orthostatic VS:           Visual Acuity      ED Medications  Medications   acetaminophen (TYLENOL) tablet 975 mg (not administered)   nitroglycerin (NITRO-BID) 2 % TD ointment 1 inch (not administered)   heparin (porcine) injection 4,000 Units (not administered)   heparin (porcine) 25,000 units in 250 mL infusion (premix) (not administered)   heparin (porcine) injection 4,000 Units (not administered)   heparin (porcine) injection 2,000 Units (not administered)   aspirin chewable tablet 162 mg (162 mg Oral Given 6/30/18 1500)   nitroglycerin (NITROSTAT) SL tablet 0 4 mg (0 4 mg Sublingual Given 6/30/18 1500)       Diagnostic Studies  Results Reviewed     Procedure Component Value Units Date/Time    Comprehensive metabolic panel [[id number]]  (Abnormal) Collected:  06/30/18 1454    Lab Status:  Final result Specimen:  Blood from Arm, Right Updated:  06/30/18 1550     Sodium 139 mmol/L      Potassium 3 5 mmol/L      Chloride 104 mmol/L      CO2 26 mmol/L      Anion Gap 9 mmol/L      BUN 12 mg/dL      Creatinine 0 88 mg/dL      Glucose 106 mg/dL      Calcium 8 9 mg/dL      AST 22 U/L      ALT 30 U/L      Alkaline Phosphatase 157 (H) U/L      Total Protein 7 4 g/dL      Albumin 3 6 g/dL      Total Bilirubin 0 80 mg/dL      eGFR 86 ml/min/1 73sq m     Narrative:         National Kidney Disease Education Program recommendations are as follows:  GFR calculation is accurate only with a steady state creatinine  Chronic Kidney disease less than 60 ml/min/1 73 sq  meters  Kidney failure less than 15 ml/min/1 73 sq  meters      Troponin I [[id number]]  (Abnormal) Collected:  06/30/18 1454    Lab Status:  Final result Specimen:  Blood from Arm, Right Updated:  06/30/18 1522     Troponin I 0 12 (H) ng/mL     Protime-INR [[id number]]  (Normal) Collected:  06/30/18 1454    Lab Status:  Final result Specimen:  Blood from Arm, Right Updated:  06/30/18 1515     Protime 12 2 seconds      INR 0 93    APTT [[id number]]  (Normal) Collected:  06/30/18 1454    Lab Status:  Final result Specimen:  Blood from Arm, Right Updated:  06/30/18 1515     PTT 35 seconds     CBC and differential [[id number]]  (Abnormal) Collected:  06/30/18 1454    Lab Status:  Final result Specimen:  Blood from Arm, Right Updated:  06/30/18 1504     WBC 7 89 Thousand/uL      RBC 4 33 Million/uL      Hemoglobin 14 2 g/dL      Hematocrit 41 1 %      MCV 95 fL      MCH 32 8 pg      MCHC 34 5 g/dL      RDW 12 4 %      MPV 10 5 fL      Platelets 356 Thousands/uL      Neutrophils Relative 54 %      Lymphocytes Relative 26 %      Monocytes Relative 12 %      Eosinophils Relative 7 (H) %      Basophils Relative 1 %      Neutrophils Absolute 4 29 Thousands/µL Lymphocytes Absolute 2 05 Thousands/µL      Monocytes Absolute 0 97 Thousand/µL      Eosinophils Absolute 0 53 Thousand/µL      Basophils Absolute 0 05 Thousands/µL                  XR chest 2 views   ED Interpretation by Nuha Hunter PA-C (06/30 1542)   Clear lungs      Final Result by Lisy Thayer MD (06/30 1543)   No acute cardiopulmonary disease  Stable      Workstation performed: [id number]                    Procedures  ECG 12 Lead Documentation  Date/Time: 6/30/2018 3:45 PM  Performed by: Ramesh Lindsey  Authorized by: Ramesh Lindsey     Indications / Diagnosis:  Chest pain  ECG reviewed by me, the ED Provider: yes    Patient location:  ED  Previous ECG:     Previous ECG:  Compared to current    Comparison ECG info:  8/18/17    Similarity:  Changes noted  Interpretation:     Interpretation: normal    Rate:     ECG rate:  64    ECG rate assessment: normal    Rhythm:     Rhythm: sinus rhythm    Ectopy:     Ectopy: none    QRS:     QRS axis:  Normal    QRS intervals:  Normal  Conduction:     Conduction: normal    ST segments:     ST segments:  Normal  T waves:     T waves: inverted      Inverted:  III           Phone Contacts  ED Phone Contact    ED Course         HEART Risk Score      Most Recent Value   History  2 Filed at: 06/30/2018 1551   ECG  0 Filed at: 06/30/2018 1551   Age  2 Filed at: 06/30/2018 1551   Risk Factors  1 Filed at: 06/30/2018 1551   Troponin  1 Filed at: 06/30/2018 1551   Heart Score Risk Calculator   History  2 Filed at: 06/30/2018 1551   ECG  0 Filed at: 06/30/2018 1551   Age  2 Filed at: 06/30/2018 1551   Risk Factors  1 Filed at: 06/30/2018 1551   Troponin  1 Filed at: 06/30/2018 1551   HEART Score  6 Filed at: 06/30/2018 1551   HEART Score  6 Filed at: 06/30/2018 1551                            MDM  Number of Diagnoses or Management Options  Diagnosis management comments: Differential diagnosis includes but not limited to:  Acute coronary syndrome, dysrhythmia  Amount and/or Complexity of Data Reviewed  Clinical lab tests: ordered and reviewed  Tests in the radiology section of CPT®: ordered and reviewed  Independent visualization of images, tracings, or specimens: yes      CritCare Time    Disposition  Final diagnoses:   None     ED Disposition     None      Follow-up Information    None         Patient's Medications   Discharge Prescriptions    No medications on file     No discharge procedures on file      ED Provider  Electronically Signed by           Chiqui Calhoun PA-C  06/30/18 8008

## 2018-06-30 NOTE — ED NOTES
Report phoned to [facility] on [address], pt prepared for transfer to [address]  06/30/18 1209

## 2018-06-30 NOTE — ASSESSMENT & PLAN NOTE
Had cardiac cath and SRINIVASA to RCA yesterday  See below for cath report  Discussed with cardiology they will ensure follow up and also patient to be discharged home on brilinta, statin, and BB  Patient feels back to normal    No complaints  Insertion site in right wrist looks good, no hematoma
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
## 2018-07-01 ENCOUNTER — APPOINTMENT (INPATIENT)
Dept: NON INVASIVE DIAGNOSTICS | Facility: HOSPITAL | Age: 73
DRG: 247 | End: 2018-07-01
Payer: COMMERCIAL

## 2018-07-01 LAB
APTT PPP: 68 SECONDS (ref 24–36)
ATRIAL RATE: 66 BPM
CHOLEST SERPL-MCNC: 187 MG/DL (ref 50–200)
ERYTHROCYTE [DISTWIDTH] IN BLOOD BY AUTOMATED COUNT: 12.5 % (ref 11.6–15.1)
EST. AVERAGE GLUCOSE BLD GHB EST-MCNC: 111 MG/DL
HBA1C MFR BLD: 5.5 % (ref 4.2–6.3)
HCT VFR BLD AUTO: 41.4 % (ref 36.5–49.3)
HDLC SERPL-MCNC: 45 MG/DL (ref 40–60)
HGB BLD-MCNC: 13.9 G/DL (ref 12–17)
LDLC SERPL CALC-MCNC: 128 MG/DL (ref 0–100)
MCH RBC QN AUTO: 31.7 PG (ref 26.8–34.3)
MCHC RBC AUTO-ENTMCNC: 33.6 G/DL (ref 31.4–37.4)
MCV RBC AUTO: 95 FL (ref 82–98)
NONHDLC SERPL-MCNC: 142 MG/DL
P AXIS: 46 DEGREES
PLATELET # BLD AUTO: 162 THOUSANDS/UL (ref 149–390)
PMV BLD AUTO: 9.6 FL (ref 8.9–12.7)
PR INTERVAL: 196 MS
QRS AXIS: 13 DEGREES
QRSD INTERVAL: 82 MS
QT INTERVAL: 410 MS
QTC INTERVAL: 420 MS
RBC # BLD AUTO: 4.38 MILLION/UL (ref 3.88–5.62)
T WAVE AXIS: 14 DEGREES
TRIGL SERPL-MCNC: 72 MG/DL
TROPONIN I SERPL-MCNC: 0.16 NG/ML
VENTRICULAR RATE: 63 BPM
WBC # BLD AUTO: 8.42 THOUSAND/UL (ref 4.31–10.16)

## 2018-07-01 PROCEDURE — 93306 TTE W/DOPPLER COMPLETE: CPT | Performed by: INTERNAL MEDICINE

## 2018-07-01 PROCEDURE — 85027 COMPLETE CBC AUTOMATED: CPT | Performed by: INTERNAL MEDICINE

## 2018-07-01 PROCEDURE — 93010 ELECTROCARDIOGRAM REPORT: CPT | Performed by: INTERNAL MEDICINE

## 2018-07-01 PROCEDURE — 80061 LIPID PANEL: CPT | Performed by: INTERNAL MEDICINE

## 2018-07-01 PROCEDURE — 99254 IP/OBS CNSLTJ NEW/EST MOD 60: CPT | Performed by: INTERNAL MEDICINE

## 2018-07-01 PROCEDURE — 84484 ASSAY OF TROPONIN QUANT: CPT | Performed by: INTERNAL MEDICINE

## 2018-07-01 PROCEDURE — 99232 SBSQ HOSP IP/OBS MODERATE 35: CPT | Performed by: INTERNAL MEDICINE

## 2018-07-01 PROCEDURE — 83036 HEMOGLOBIN GLYCOSYLATED A1C: CPT | Performed by: INTERNAL MEDICINE

## 2018-07-01 PROCEDURE — 93306 TTE W/DOPPLER COMPLETE: CPT

## 2018-07-01 PROCEDURE — 85730 THROMBOPLASTIN TIME PARTIAL: CPT | Performed by: INTERNAL MEDICINE

## 2018-07-01 RX ORDER — SODIUM CHLORIDE 9 MG/ML
125 INJECTION, SOLUTION INTRAVENOUS CONTINUOUS
Status: DISCONTINUED | OUTPATIENT
Start: 2018-07-01 | End: 2018-07-03 | Stop reason: HOSPADM

## 2018-07-01 RX ORDER — ASPIRIN 81 MG/1
324 TABLET, CHEWABLE ORAL ONCE
Status: COMPLETED | OUTPATIENT
Start: 2018-07-02 | End: 2018-07-02

## 2018-07-01 RX ADMIN — ATORVASTATIN CALCIUM 40 MG: 40 TABLET, FILM COATED ORAL at 17:41

## 2018-07-01 RX ADMIN — MEMANTINE 10 MG: 10 TABLET ORAL at 09:07

## 2018-07-01 RX ADMIN — ACETAMINOPHEN 650 MG: 325 TABLET, FILM COATED ORAL at 10:49

## 2018-07-01 RX ADMIN — MEMANTINE 10 MG: 10 TABLET ORAL at 17:41

## 2018-07-01 RX ADMIN — HEPARIN SODIUM AND DEXTROSE 11.1 UNITS/KG/HR: 10000; 5 INJECTION INTRAVENOUS at 13:33

## 2018-07-01 RX ADMIN — METOPROLOL TARTRATE 25 MG: 25 TABLET, FILM COATED ORAL at 21:46

## 2018-07-01 RX ADMIN — ASPIRIN 81 MG 81 MG: 81 TABLET ORAL at 09:06

## 2018-07-01 RX ADMIN — FLUOXETINE 20 MG: 20 CAPSULE ORAL at 09:08

## 2018-07-01 RX ADMIN — ARIPIPRAZOLE 5 MG: 5 TABLET ORAL at 09:08

## 2018-07-01 RX ADMIN — METOPROLOL TARTRATE 25 MG: 25 TABLET, FILM COATED ORAL at 09:07

## 2018-07-01 NOTE — CONSULTS
Cardiology   Cesar Cassidy 72 y o  male MRN: [id number]  Unit/Bed#: -01 Encounter: [id number]      Reason for Consult / Principal Problem:    Physician Requesting Consult: Hilario Lefort, MD    Cardiologist:  Dr Wily Cervantes    PCP:  Dr Toni Chowdary MD      Assessment/Plan:  Unstable angina versus non STEMI type 1- continue to trend troponins  Continue aspirin and IV heparin  Would not pre loaded with anti-platelet therapy due to his family history of CABG  Patient can be loaded on the table if needed  Check 2D echocardiogram schedule for cardiac catheterization in a   based on patient's symptoms  Hypertension -continue the Lopressor  PTSD- continue Prozac and Namenda  Hyperlipidemia - start statin      HPI: Cesar Cassidy 72y o  year old male presented to the emergency room yesterday with chest pressure and tingling in his left arm for multiple times over the past week ( started on Wednesday)  Patient is currently moving to a new home and is moving his furniture with his wife  He has been exerting himself multiple times during the day in this excessive heat and has had multiple episodes of chest pressure with some radiation to his left arm with tingling  He attributed this primarily to the heat and heavy lifting  Last episode was very intense which he rated as 9/10 he was diaphoretic nausea S lightheaded and his blood pressure on admission was 187/109  Patient had been taking aspirin every time he had the chest pain  Patient does have family history of coronary disease his father had bypass surgery in his mother had some type  of valvular heart disease which he thinks was mitral valve  Patient does follow with Dr Ramila Anne over at the [address] [place] [address] group documentation in Care everywhere,  he had an 2D echocardiogram in October 2017 which revealed ejection fraction of 60%    Patient states he has had multiple stress test but I cannot find any documentation in Care Everywhere,    Currently the patient is pain-free and is on heparin drip  He is in sinus bradycardia with no ectopy  Family History:   Family History   Problem Relation Age of Onset    Family history unknown: Yes     Historical Information   Past Medical History:   Diagnosis Date    Concussion     Hypertension      History reviewed  No pertinent surgical history  Social History   History   Alcohol Use    7 2 oz/week    12 Cans of beer per week     Comment: 1 bottle of wine a week     History   Drug Use No     History   Smoking Status    Never Smoker   Smokeless Tobacco    Never Used     Family History:   Family History   Problem Relation Age of Onset    Family history unknown: Yes           Review of Systems:  Review of Systems   Constitutional: Positive for fatigue  HENT: Negative  Eyes: Negative  Respiratory: Positive for shortness of breath  Cardiovascular: Positive for chest pain  Gastrointestinal: Negative  Endocrine: Negative  Genitourinary: Negative  Musculoskeletal: Negative  Skin: Negative  Allergic/Immunologic: Negative  Neurological: Negative  Hematological: Negative  Psychiatric/Behavioral: Negative          Scheduled Meds:  Current Facility-Administered Medications:  acetaminophen 650 mg Oral Q6H PRN Yunior Gutiérrez MD    ARIPiprazole 5 mg Oral Daily Yunior Gutiérrez MD    aspirin 81 mg Oral Daily Yunior Gtuiérrez MD    atorvastatin 40 mg Oral Daily With Marcela Paris MD    FLUoxetine 20 mg Oral Daily Yunior Gutiérrez MD    heparin (porcine) 3-20 Units/kg/hr (Order-Specific) Intravenous Titrated Kalina Omalley PA-C Last Rate: 11 1 Units/kg/hr (06/30/18 1632)   heparin (porcine) 2,000 Units Intravenous PRN Kalina Omalley PA-C    heparin (porcine) 4,000 Units Intravenous PRN Hali Pedroza PA-C    memantine 10 mg Oral BID Yunior Gutiérrez MD    metoprolol 5 mg Intravenous Q6H PRN Yunior Gutiérrez MD    metoprolol tartrate 25 mg Oral Q12H [address] Yunior Gutiérrez MD    nitroglycerin 0 4 mg Sublingual Q5 Min PRN Raina Tran MD    ondansetron 4 mg Intravenous Q4H PRN Raina Tran MD      Continuous Infusions:  heparin (porcine) 3-20 Units/kg/hr (Order-Specific) Last Rate: 11 1 Units/kg/hr (18 1632)     PRN Meds:   acetaminophen    heparin (porcine)    heparin (porcine)    metoprolol    nitroglycerin    ondansetron  PTA meds:   Prior to Admission Medications   Prescriptions Last Dose Informant Patient Reported? Taking? ARIPiprazole (ABILIFY) 5 mg tablet 2018 at Unknown time  No Yes   Si tab daily  FLUoxetine (PROzac) 20 mg capsule 2018 at Unknown time  No Yes   Sig: Take 1 capsule (20 mg total) by mouth daily   aspirin 81 MG tablet 2018 at Unknown time  Yes Yes   Sig: Take 81 mg by mouth   cloNIDine (CATAPRES) 0 1 mg tablet 2018 at Unknown time  No Yes   Sig: Take 0 1mg nightly  After 4-5 days, may increase to 0 2mg nightly   memantine (NAMENDA) 10 mg tablet 2018 at Unknown time  No Yes   Sig: TAKE 1 TABLET TWICE DAILY FOR MEMORY  Facility-Administered Medications: None       Allergies   Allergen Reactions    Codeine     Other      Annotation - [id number]: anesthesia - took a long time to wake up and was very nauseated and with vomiting    Oxycodone GI Intolerance, Nausea Only and Abdominal Pain    Prednisone Other (See Comments)     Other reaction(s): Depression, mood swings, anger  psychosis       Objective   Vitals: Blood pressure 158/88, pulse 56, temperature 98 °F (36 7 °C), temperature source Oral, resp  rate 18, height 5' 11" (1 803 m), weight 90 kg (198 lb 6 6 oz), SpO2 97 %  , Body mass index is 27 67 kg/m² , Orthostatic Blood Pressures      Most Recent Value   Blood Pressure  158/88 filed at 2018   Patient Position - Orthostatic VS  Lying filed at 2018          No intake or output data in the 24 hours ending 18 0825    Invasive Devices     Peripheral Intravenous Line            Peripheral IV 18 Right Antecubital 1 day                Physical Exam:  Physical Exam   Constitutional: He is oriented to person, place, and time  He appears well-developed and well-nourished  HENT:   Head: Normocephalic and atraumatic  Neck: Neck supple  Cardiovascular: Normal rate, regular rhythm, normal heart sounds and intact distal pulses  Pulmonary/Chest: Effort normal and breath sounds normal    Abdominal: Soft  Bowel sounds are normal    Musculoskeletal: He exhibits no edema  Neurological: He is alert and oriented to person, place, and time  Skin: Skin is warm and dry     Psychiatric: His behavior is normal          Lab Results:      Recent Results (from the past 24 hour(s))   CBC and differential    Collection Time: 06/30/18  2:54 PM   Result Value Ref Range    WBC 7 89 4 31 - 10 16 Thousand/uL    RBC 4 33 3 88 - 5 62 Million/uL    Hemoglobin 14 2 12 0 - 17 0 g/dL    Hematocrit 41 1 36 5 - 49 3 %    MCV 95 82 - 98 fL    MCH 32 8 26 8 - 34 3 pg    MCHC 34 5 31 4 - 37 4 g/dL    RDW 12 4 11 6 - 15 1 %    MPV 10 5 8 9 - 12 7 fL    Platelets 891 769 - 972 Thousands/uL    Neutrophils Relative 54 43 - 75 %    Lymphocytes Relative 26 14 - 44 %    Monocytes Relative 12 4 - 12 %    Eosinophils Relative 7 (H) 0 - 6 %    Basophils Relative 1 0 - 1 %    Neutrophils Absolute 4 29 1 85 - 7 62 Thousands/µL    Lymphocytes Absolute 2 05 0 60 - 4 47 Thousands/µL    Monocytes Absolute 0 97 0 17 - 1 22 Thousand/µL    Eosinophils Absolute 0 53 0 00 - 0 61 Thousand/µL    Basophils Absolute 0 05 0 00 - 0 10 Thousands/µL   Protime-INR    Collection Time: 06/30/18  2:54 PM   Result Value Ref Range    Protime 12 2 11 8 - 14 2 seconds    INR 0 93 0 86 - 1 17   APTT    Collection Time: 06/30/18  2:54 PM   Result Value Ref Range    PTT 35 24 - 36 seconds   Comprehensive metabolic panel    Collection Time: 06/30/18  2:54 PM   Result Value Ref Range    Sodium 139 136 - 145 mmol/L    Potassium 3 5 3 5 - 5 3 mmol/L    Chloride 104 100 - 108 mmol/L CO2 26 21 - 32 mmol/L    Anion Gap 9 4 - 13 mmol/L    BUN 12 5 - 25 mg/dL    Creatinine 0 88 0 60 - 1 30 mg/dL    Glucose 106 65 - 140 mg/dL    Calcium 8 9 8 3 - 10 1 mg/dL    AST 22 5 - 45 U/L    ALT 30 12 - 78 U/L    Alkaline Phosphatase 157 (H) 46 - 116 U/L    Total Protein 7 4 6 4 - 8 2 g/dL    Albumin 3 6 3 5 - 5 0 g/dL    Total Bilirubin 0 80 0 20 - 1 00 mg/dL    eGFR 86 ml/min/1 73sq m   Troponin I    Collection Time: 06/30/18  2:54 PM   Result Value Ref Range    Troponin I 0 12 (H) <=0 04 ng/mL   Troponin I    Collection Time: 06/30/18  6:49 PM   Result Value Ref Range    Troponin I 0 15 (H) <=0 04 ng/mL   APTT    Collection Time: 06/30/18  9:04 PM   Result Value Ref Range    PTT 81 (H) 24 - 36 seconds   Troponin I    Collection Time: 06/30/18  9:04 PM   Result Value Ref Range    Troponin I 0 16 (H) <=0 04 ng/mL   Troponin I    Collection Time: 07/01/18  1:11 AM   Result Value Ref Range    Troponin I 0 16 (H) <=0 04 ng/mL   APTT    Collection Time: 07/01/18  3:04 AM   Result Value Ref Range    PTT 68 (H) 24 - 36 seconds   Lipid panel    Collection Time: 07/01/18  6:21 AM   Result Value Ref Range    Cholesterol 187 50 - 200 mg/dL    Triglycerides 72 <=150 mg/dL    HDL, Direct 45 40 - 60 mg/dL    LDL Calculated 128 (H) 0 - 100 mg/dL    Non-HDL-Chol (CHOL-HDL) 142 mg/dl   CBC and Platelet    Collection Time: 07/01/18  6:21 AM   Result Value Ref Range    WBC 8 42 4 31 - 10 16 Thousand/uL    RBC 4 38 3 88 - 5 62 Million/uL    Hemoglobin 13 9 12 0 - 17 0 g/dL    Hematocrit 41 4 36 5 - 49 3 %    MCV 95 82 - 98 fL    MCH 31 7 26 8 - 34 3 pg    MCHC 33 6 31 4 - 37 4 g/dL    RDW 12 5 11 6 - 15 1 %    Platelets 316 180 - 711 Thousands/uL    MPV 9 6 8 9 - 12 7 fL   ]      Imaging: I have personally reviewed pertinent films in PACS    EKG:  Sinus rhythm with flipped T-waves in the inferior leads    CHEST X-RAY:  No active disease    ECHO:  Pending    ECHO at [facility] in October 2017 revealed an ejection fraction of 60%     Code Status:  Level 1      Counseling / Coordination of Care  Total floor / unit time spent today 45 minutes  Greater than 50% of total time was spent with the patient and / or family counseling and / or coordination of care  Patient Was Seen By: Jerrald Bence, CRNP, Acute Care Nurse Practitioner      ** Please Note: Fluency Direct Dictation voice to text software may have been used in the creation of this document   **

## 2018-07-01 NOTE — CASE MANAGEMENT
Initial Clinical Review    Admission: Date/Time/Statement: 6/30/18 @ 1549 INPATIENT    Orders Placed This Encounter   Procedures    Inpatient Admission (expected length of stay for this patient is greater than two midnights)     Standing Status:   Standing     Number of Occurrences:   1     Order Specific Question:   Admitting Physician     Answer:   Kandace Richardson     Order Specific Question:   Level of Care     Answer:   Med Surg [16]     Order Specific Question:   Estimated length of stay     Answer:   More than 2 Midnights     Order Specific Question:   Certification     Answer:   I certify that inpatient services are medically necessary for this patient for a duration of greater than two midnights  See H&P and MD Progress Notes for additional information about the patient's course of treatment  ED: Date/Time/Mode of Arrival:   ED Arrival Information     Expected Arrival Acuity Means of Arrival Escorted By Service Admission Type    - 6/30/2018 13:15 Urgent Walk-In Self General Medicine Urgent    Arrival Complaint    cp, sob, arm pain          Chief Complaint:   Chief Complaint   Patient presents with    Chest Pain     pt reports moving furniture and feeling SOB, shortly after started with midsternal/left sided chest squeezing and left arm numbness and tingling, pt diaphoretic, took 2 ASA PTA, 8/10 pain       History of Illness:   Fran Simental is a 72 y o  male who presents with complaints of recurrent chest pain over the last week  Approximately three days ago, he was in the process of relocating from one house to another and upon moving furniture/boxes in the heat, he developed a similar sensation of pain again which lasted approximately 30 minutes to an hour  This was associated with radiation into his left shoulder/arm and some lightheadedness  He does note that at that point he did take a few aspirins which did relieve his symptoms approximately an hour later    Today, he continued to exert himself by moving heavy items out in the sun again and developed similar symptoms this time worse in intensity with associated left arm radiation, lightheadedness, and diaphoresis       ED Vital Signs:   ED Triage Vitals   Temperature Pulse Respirations Blood Pressure SpO2   06/30/18 1717 06/30/18 1329 06/30/18 1329 06/30/18 1329 06/30/18 1329   97 8 °F (36 6 °C) 62 20 (!) 187/109 98 %   Pain Score 8      06/30/18 1329               Wt Readings from Last 1 Encounters:   06/30/18 90 kg (198 lb 6 6 oz)       Vital Signs (abnormal):   Date/Time  Temp  Pulse  Resp  BP  MAP (mmHg)  SpO2  O2 Device  Patient Position - Orthostatic VS     06/30/18 1630  --  60  --  162/91  --  98 %  --  --   06/30/18 1502  --  63  16  166/95  --  98 %  --  --   06/30/18 1430  --  62  --   174/97  --  97 %  --  --   06/30/18 1415  --  60  --  --  --  93 %  --  --   06/30/18 1400  --  60  --  156/95  --  93 %  --  --   06/30/18 1345  --  62  --   166/105  --  96 %  --  --   06/30/18 1329  --  62  20   187/109  --  98 %  None (Room air)  Sitting       Abnormal Labs/Diagnostic Test Results:     Troponin = 0 12, 0 15, 0 16, 0 16    EKG: Sinus rhythm, with flipped T waves in inferior leads  Chest x-ray: No active cardiopulmonary disease  ED Treatment:   Medication Administration from 06/30/2018 1315 to 06/30/2018 1714       Date/Time Order Dose Route Action     06/30/2018 1500 aspirin chewable tablet 162 mg 162 mg Oral Given     06/30/2018 1500 nitroglycerin (NITROSTAT) SL tablet 0 4 mg 0 4 mg Sublingual Given     06/30/2018 1623 acetaminophen (TYLENOL) tablet 975 mg 975 mg Oral Given     06/30/2018 1623 nitroglycerin (NITRO-BID) 2 % TD ointment 1 inch 1 inch Topical Given     06/30/2018 1632 heparin (porcine) injection 4,000 Units 4,000 Units Intravenous Given     06/30/2018 1632 heparin (porcine) 25,000 units in 250 mL infusion (premix) 11 1 Units/kg/hr Intravenous New Bag          Past Medical/Surgical History:    Active Ambulatory Problems     Diagnosis Date Noted    Postconcussion syndrome 09/20/2016    Concussion 09/20/2016    Cervical radiculopathy 09/21/2016    MDD (major depressive disorder), single episode, severe , no psychosis ([facility] [address] ) 08/25/2017    Cognitive neurologic deficit - Depression 08/01/2017    PTSD (post-traumatic stress disorder) 08/25/2017     Past Medical History:   Diagnosis Date    Concussion     Hypertension        Admitting Diagnosis: SOB (shortness of breath) [R06 02]    Age/Sex: 72 y o  male    Assessment/Plan:     NSTEMI type 1    Assessment & Plan     - initial troponin of 0 12 with recurrent chest pain and questionable T-wave changes   - trend serial troponin sets - continue heparin drip - initiated beta-blocker - continue ASA and start on statin   - telemetry monitoring - will appreciate cardiology evaluation  - maintain oxygenation - PRN SL NTG for acute attacks   - check fasting lipid panel and HgA1c   - patient reassured        Accelerated hypertension on admission - history of Essential hypertension   Assessment & Plan     - low-sodium diet heavily encouraged  - hold Catapres - initiated beta-blockade with Lopressor in light of NSTEMI   - PRN IV Lopressor on board for BP spikes       DVT Prophylaxis:  Heparin drip    Admission Orders:    Continuous cardiac monitoring and pulse oximetry  Cardiology consult  Sequential compression device  ECHO      Scheduled Meds:   Current Facility-Administered Medications:  acetaminophen 650 mg Oral Q6H PRN   ARIPiprazole 5 mg Oral Daily   aspirin 81 mg Oral Daily   atorvastatin 40 mg Oral Daily With Dinner   FLUoxetine 20 mg Oral Daily   heparin (porcine) 2,000 Units Intravenous PRN   heparin (porcine) 4,000 Units Intravenous PRN   memantine 10 mg Oral BID   metoprolol 5 mg Intravenous Q6H PRN   metoprolol tartrate 25 mg Oral Q12H [address]   nitroglycerin 0 4 mg Sublingual Q5 Min PRN   ondansetron 4 mg Intravenous Q4H PRN     Continuous Infusions:     heparin (porcine) 3-20 Units/kg/hr (Order-Specific) Last Rate: 11 1 Units/kg/hr (07/01/18 1333)     ============================================================  7/1/18 Cardiology Consult:      Assessment/Plan:  Unstable angina versus non STEMI type 1- continue to trend troponins  Continue aspirin and IV heparin  Would not pre loaded with anti-platelet therapy due to his family history of CABG  Patient can be loaded on the table if needed  Check 2D echocardiogram schedule for cardiac catheterization in a m  based on patient's symptoms  Hypertension -continue the Lopressor  PTSD- continue Prozac and Namenda  Hyperlipidemia - start statin             Thank you,  Malachi Aqq  [facility] Utilization Review Department  Phone: [phone]; Fax [phone]  ATTENTION: The Network Utilization Review Department is now centralized for our 9 Facilities  Make a note that we have a new phone and fax numbers for our Department  Please call with any questions or concerns to [phone] and carefully follow the prompts so that you are directed to the right person  All voicemails are confidential  Fax any determinations, approvals, denials, and requests for initial or continue stay review clinical to [phone]  Due to HIGH CALL volume, it would be easier if you could please send faxed requests to expedite your requests and in part, help us provide discharge notifications faster

## 2018-07-01 NOTE — PROGRESS NOTES
[facility] - Internal Medicine Progress Note       PATIENT INFORMATION      Patient: Clinton Lopez 72 y o  male   MRN: [id number]  PCP: Janette Carr MD  Unit/Bed#: MS [facility] Encounter: [id number]  Date Of Visit: 18       ASSESSMENTS & PLAN     NSTEMI type 1    Assessment & Plan    - initial troponin of 0 12 with subsequent plateauing at 6 48 - continue heparin drip - initiated beta-blockade with Lopressor - continue ASA and initiated Lipitor (statin)   - continue telemetry monitoring - EKG on admission revealed questionable T-wave inversions in the inferior leads - plan for heart catheterization tomorrow  - maintain oxygenation - PRN SL NTG for acute attacks   - lipid panel revealed an LDL of 128 (continue ASA/statin as above) and HgA1c normal at 5 5      Accelerated hypertension on admission - history of Essential hypertension   Assessment & Plan    - low-sodium diet heavily encouraged  - hold Catapres - initiated beta-blockade with Lopressor in light of NSTEMI   - PRN IV Lopressor on board for BP spikes       Cognitive neurologic deficit - Depression - Posttraumatic stress disorder   Assessment & Plan    - continue Abilify/Prozac/Namenda        VTE Prophylaxis:  Heparin drip      SUBJECTIVE     Seen/examined with family bedside today  Denies any chest pain at this time  He is tolerating the heparin drip and awaiting heart catheterization tomorrow  OBJECTIVE     Vitals:   Temp (24hrs), Av 9 °F (36 6 °C), Min:97 8 °F (36 6 °C), Max:98 °F (36 7 °C)    HR:  [53-64] 56  Resp:  [16-18] 18  BP: (156-169)/(86-95) 158/88  SpO2:  [96 %-98 %] 97 %  Body mass index is 27 67 kg/m²       Input and Output Summary (last 24 hours):     No intake or output data in the 24 hours ending 18 1432    Physical Exam:     GENERAL:  Well-developed/nourished - no acute distress  HEAD:  Normocephalic - atraumatic  EYES: PERRL - EOMI   MOUTH:  Mucosa moist  NECK:  Supple - full range of motion  CARDIAC:  Regular rate/rhythm - S1/S2 positive  PULMONARY:  Clear breath sounds bilaterally - nonlabored respirations  ABDOMEN:  Soft - nontender/nondistended - active bowel sounds  MUSCULOSKELETAL:  Motor strength/range of motion intact  NEUROLOGIC:  Alert/oriented x 3  SKIN:  Age-appropriate wrinkles/blemishes   PSYCHIATRIC:  Mood/affect remains pleasant      ADDITIONAL DATA       Labs & Recent Cultures:       Results from last 7 days  Lab Units 07/01/18  0621 06/30/18  1454   WBC Thousand/uL 8 42 7 89   HEMOGLOBIN g/dL 13 9 14 2   HEMATOCRIT % 41 4 41 1   PLATELETS Thousands/uL 162 175   NEUTROS PCT %  --  54   LYMPHS PCT %  --  26   MONOS PCT %  --  12   EOS PCT %  --  7*       Results from last 7 days  Lab Units 06/30/18  1454   SODIUM mmol/L 139   POTASSIUM mmol/L 3 5   CHLORIDE mmol/L 104   CO2 mmol/L 26   BUN mg/dL 12   CREATININE mg/dL 0 88   CALCIUM mg/dL 8 9   TOTAL PROTEIN g/dL 7 4   BILIRUBIN TOTAL mg/dL 0 80   ALK PHOS U/L 157*   ALT U/L 30   AST U/L 22   GLUCOSE RANDOM mg/dL 106       Results from last 7 days  Lab Units 06/30/18  1454   INR  0 93         Last 24 Hours Medication List:     Current Facility-Administered Medications:  acetaminophen 650 mg Oral Q6H PRN Ga Hitchcock MD    ARIPiprazole 5 mg Oral Daily Ga Hitchcock MD    aspirin 81 mg Oral Daily Ga Hitchcock MD    atorvastatin 40 mg Oral Daily With Raquel De La Cruz MD    FLUoxetine 20 mg Oral Daily Ga Hitchcock MD    heparin (porcine) 3-20 Units/kg/hr (Order-Specific) Intravenous Titrated Chantell Pedroza PA-C Last Rate: 11 1 Units/kg/hr (07/01/18 1333)   heparin (porcine) 2,000 Units Intravenous PRN Chantell Pedroza PA-C    heparin (porcine) 4,000 Units Intravenous PRN Hali Pedroza PA-C    memantine 10 mg Oral BID Ga Hitchcock MD    metoprolol 5 mg Intravenous Q6H PRN Ga Hitchcock MD    metoprolol tartrate 25 mg Oral Q12H [facility] Ga Hitchcock MD    nitroglycerin 0 4 mg Sublingual Q5 Min PRN Ga Hitchcock MD ondansetron 4 mg Intravenous Q4H PRN Winnie Escamilla MD           Time Spent for Care: 33 minutes  More than 50% of total time spent on counseling and coordination of care as described above  Current Length of Stay: 1 day(s)      Code Status: Level 1 - Full Code         ** Please Note: This note is constructed using a voice recognition dictation system   **

## 2018-07-02 ENCOUNTER — APPOINTMENT (INPATIENT)
Dept: NON INVASIVE DIAGNOSTICS | Facility: HOSPITAL | Age: 73
DRG: 247 | End: 2018-07-02
Payer: COMMERCIAL

## 2018-07-02 LAB
ANION GAP SERPL CALCULATED.3IONS-SCNC: 8 MMOL/L (ref 4–13)
APTT PPP: 63 SECONDS (ref 24–36)
BUN SERPL-MCNC: 9 MG/DL (ref 5–25)
CALCIUM SERPL-MCNC: 8.8 MG/DL (ref 8.3–10.1)
CHLORIDE SERPL-SCNC: 106 MMOL/L (ref 100–108)
CO2 SERPL-SCNC: 25 MMOL/L (ref 21–32)
CREAT SERPL-MCNC: 0.82 MG/DL (ref 0.6–1.3)
ERYTHROCYTE [DISTWIDTH] IN BLOOD BY AUTOMATED COUNT: 12.4 % (ref 11.6–15.1)
GFR SERPL CREATININE-BSD FRML MDRD: 89 ML/MIN/1.73SQ M
GLUCOSE SERPL-MCNC: 104 MG/DL (ref 65–140)
HCT VFR BLD AUTO: 41.6 % (ref 36.5–49.3)
HGB BLD-MCNC: 13.9 G/DL (ref 12–17)
KCT BLD-ACNC: 380 SEC (ref 89–137)
MAGNESIUM SERPL-MCNC: 2.2 MG/DL (ref 1.6–2.6)
MCH RBC QN AUTO: 31.9 PG (ref 26.8–34.3)
MCHC RBC AUTO-ENTMCNC: 33.4 G/DL (ref 31.4–37.4)
MCV RBC AUTO: 95 FL (ref 82–98)
PLATELET # BLD AUTO: 173 THOUSANDS/UL (ref 149–390)
PMV BLD AUTO: 9.8 FL (ref 8.9–12.7)
POTASSIUM SERPL-SCNC: 4.1 MMOL/L (ref 3.5–5.3)
RBC # BLD AUTO: 4.36 MILLION/UL (ref 3.88–5.62)
SODIUM SERPL-SCNC: 139 MMOL/L (ref 136–145)
SPECIMEN SOURCE: ABNORMAL
WBC # BLD AUTO: 9.64 THOUSAND/UL (ref 4.31–10.16)

## 2018-07-02 PROCEDURE — B2151ZZ FLUOROSCOPY OF LEFT HEART USING LOW OSMOLAR CONTRAST: ICD-10-PCS | Performed by: INTERNAL MEDICINE

## 2018-07-02 PROCEDURE — C1769 GUIDE WIRE: HCPCS | Performed by: INTERNAL MEDICINE

## 2018-07-02 PROCEDURE — C1894 INTRO/SHEATH, NON-LASER: HCPCS | Performed by: INTERNAL MEDICINE

## 2018-07-02 PROCEDURE — 99153 MOD SED SAME PHYS/QHP EA: CPT | Performed by: INTERNAL MEDICINE

## 2018-07-02 PROCEDURE — B2111ZZ FLUOROSCOPY OF MULTIPLE CORONARY ARTERIES USING LOW OSMOLAR CONTRAST: ICD-10-PCS | Performed by: INTERNAL MEDICINE

## 2018-07-02 PROCEDURE — 99152 MOD SED SAME PHYS/QHP 5/>YRS: CPT | Performed by: INTERNAL MEDICINE

## 2018-07-02 PROCEDURE — 92928 PRQ TCAT PLMT NTRAC ST 1 LES: CPT | Performed by: INTERNAL MEDICINE

## 2018-07-02 PROCEDURE — 93458 L HRT ARTERY/VENTRICLE ANGIO: CPT | Performed by: INTERNAL MEDICINE

## 2018-07-02 PROCEDURE — C9600 PERC DRUG-EL COR STENT SING: HCPCS | Performed by: INTERNAL MEDICINE

## 2018-07-02 PROCEDURE — C1725 CATH, TRANSLUMIN NON-LASER: HCPCS | Performed by: INTERNAL MEDICINE

## 2018-07-02 PROCEDURE — 027034Z DILATION OF CORONARY ARTERY, ONE ARTERY WITH DRUG-ELUTING INTRALUMINAL DEVICE, PERCUTANEOUS APPROACH: ICD-10-PCS | Performed by: INTERNAL MEDICINE

## 2018-07-02 PROCEDURE — 85730 THROMBOPLASTIN TIME PARTIAL: CPT | Performed by: INTERNAL MEDICINE

## 2018-07-02 PROCEDURE — C1887 CATHETER, GUIDING: HCPCS | Performed by: INTERNAL MEDICINE

## 2018-07-02 PROCEDURE — 4A023N7 MEASUREMENT OF CARDIAC SAMPLING AND PRESSURE, LEFT HEART, PERCUTANEOUS APPROACH: ICD-10-PCS | Performed by: INTERNAL MEDICINE

## 2018-07-02 PROCEDURE — 99232 SBSQ HOSP IP/OBS MODERATE 35: CPT | Performed by: INTERNAL MEDICINE

## 2018-07-02 PROCEDURE — 83735 ASSAY OF MAGNESIUM: CPT | Performed by: INTERNAL MEDICINE

## 2018-07-02 PROCEDURE — 85027 COMPLETE CBC AUTOMATED: CPT | Performed by: INTERNAL MEDICINE

## 2018-07-02 PROCEDURE — C1874 STENT, COATED/COV W/DEL SYS: HCPCS

## 2018-07-02 PROCEDURE — 80048 BASIC METABOLIC PNL TOTAL CA: CPT | Performed by: INTERNAL MEDICINE

## 2018-07-02 PROCEDURE — 85347 COAGULATION TIME ACTIVATED: CPT

## 2018-07-02 RX ORDER — CLONIDINE HYDROCHLORIDE 0.1 MG/1
0.1 TABLET ORAL ONCE
Status: COMPLETED | OUTPATIENT
Start: 2018-07-02 | End: 2018-07-02

## 2018-07-02 RX ORDER — ONDANSETRON 2 MG/ML
INJECTION INTRAMUSCULAR; INTRAVENOUS CODE/TRAUMA/SEDATION MEDICATION
Status: COMPLETED | OUTPATIENT
Start: 2018-07-02 | End: 2018-07-02

## 2018-07-02 RX ORDER — LIDOCAINE HYDROCHLORIDE 10 MG/ML
INJECTION, SOLUTION INFILTRATION; PERINEURAL CODE/TRAUMA/SEDATION MEDICATION
Status: COMPLETED | OUTPATIENT
Start: 2018-07-02 | End: 2018-07-02

## 2018-07-02 RX ORDER — FENTANYL CITRATE 50 UG/ML
INJECTION, SOLUTION INTRAMUSCULAR; INTRAVENOUS CODE/TRAUMA/SEDATION MEDICATION
Status: COMPLETED | OUTPATIENT
Start: 2018-07-02 | End: 2018-07-02

## 2018-07-02 RX ORDER — VERAPAMIL HYDROCHLORIDE 2.5 MG/ML
INJECTION, SOLUTION INTRAVENOUS CODE/TRAUMA/SEDATION MEDICATION
Status: COMPLETED | OUTPATIENT
Start: 2018-07-02 | End: 2018-07-02

## 2018-07-02 RX ORDER — MIDAZOLAM HYDROCHLORIDE 1 MG/ML
INJECTION INTRAMUSCULAR; INTRAVENOUS CODE/TRAUMA/SEDATION MEDICATION
Status: COMPLETED | OUTPATIENT
Start: 2018-07-02 | End: 2018-07-02

## 2018-07-02 RX ORDER — NITROGLYCERIN 20 MG/100ML
INJECTION INTRAVENOUS CODE/TRAUMA/SEDATION MEDICATION
Status: COMPLETED | OUTPATIENT
Start: 2018-07-02 | End: 2018-07-02

## 2018-07-02 RX ORDER — ASPIRIN 81 MG/1
81 TABLET, CHEWABLE ORAL DAILY
Status: DISCONTINUED | OUTPATIENT
Start: 2018-07-02 | End: 2018-07-02 | Stop reason: SDUPTHER

## 2018-07-02 RX ORDER — HEPARIN SODIUM 1000 [USP'U]/ML
INJECTION, SOLUTION INTRAVENOUS; SUBCUTANEOUS CODE/TRAUMA/SEDATION MEDICATION
Status: COMPLETED | OUTPATIENT
Start: 2018-07-02 | End: 2018-07-02

## 2018-07-02 RX ORDER — CLONIDINE HYDROCHLORIDE 0.1 MG/1
0.1 TABLET ORAL
Status: DISCONTINUED | OUTPATIENT
Start: 2018-07-03 | End: 2018-07-03 | Stop reason: HOSPADM

## 2018-07-02 RX ORDER — SODIUM CHLORIDE 9 MG/ML
100 INJECTION, SOLUTION INTRAVENOUS CONTINUOUS
Status: DISPENSED | OUTPATIENT
Start: 2018-07-02 | End: 2018-07-02

## 2018-07-02 RX ADMIN — MEMANTINE 10 MG: 10 TABLET ORAL at 09:15

## 2018-07-02 RX ADMIN — IOHEXOL 150 ML: 350 INJECTION, SOLUTION INTRAVENOUS at 11:20

## 2018-07-02 RX ADMIN — SODIUM CHLORIDE 125 ML/HR: 0.9 INJECTION, SOLUTION INTRAVENOUS at 05:52

## 2018-07-02 RX ADMIN — TICAGRELOR 90 MG: 90 TABLET ORAL at 17:33

## 2018-07-02 RX ADMIN — METOPROLOL TARTRATE 25 MG: 25 TABLET, FILM COATED ORAL at 09:15

## 2018-07-02 RX ADMIN — ACETAMINOPHEN 650 MG: 325 TABLET, FILM COATED ORAL at 11:36

## 2018-07-02 RX ADMIN — FENTANYL CITRATE 50 MCG: 50 INJECTION INTRAMUSCULAR; INTRAVENOUS at 10:49

## 2018-07-02 RX ADMIN — TICAGRELOR 180 MG: 90 TABLET ORAL at 11:01

## 2018-07-02 RX ADMIN — VERAPAMIL HYDROCHLORIDE 2.5 MG: 2.5 INJECTION, SOLUTION INTRAVENOUS at 10:57

## 2018-07-02 RX ADMIN — METOPROLOL TARTRATE 25 MG: 25 TABLET, FILM COATED ORAL at 21:13

## 2018-07-02 RX ADMIN — HEPARIN SODIUM 6000 UNITS: 1000 INJECTION INTRAVENOUS; SUBCUTANEOUS at 11:01

## 2018-07-02 RX ADMIN — LIDOCAINE HYDROCHLORIDE 1 ML: 10 INJECTION, SOLUTION INFILTRATION; PERINEURAL at 10:55

## 2018-07-02 RX ADMIN — ASPIRIN 81 MG 324 MG: 81 TABLET ORAL at 09:15

## 2018-07-02 RX ADMIN — ATORVASTATIN CALCIUM 40 MG: 40 TABLET, FILM COATED ORAL at 17:33

## 2018-07-02 RX ADMIN — FLUOXETINE 20 MG: 20 CAPSULE ORAL at 09:15

## 2018-07-02 RX ADMIN — NITROGLYCERIN 200 MCG: 20 INJECTION INTRAVENOUS at 10:57

## 2018-07-02 RX ADMIN — MEMANTINE 10 MG: 10 TABLET ORAL at 17:33

## 2018-07-02 RX ADMIN — ONDANSETRON 4 MG: 2 INJECTION INTRAMUSCULAR; INTRAVENOUS at 10:49

## 2018-07-02 RX ADMIN — ARIPIPRAZOLE 5 MG: 5 TABLET ORAL at 09:15

## 2018-07-02 RX ADMIN — MIDAZOLAM HYDROCHLORIDE 2 MG: 1 INJECTION, SOLUTION INTRAMUSCULAR; INTRAVENOUS at 10:49

## 2018-07-02 RX ADMIN — HEPARIN SODIUM 4000 UNITS: 1000 INJECTION INTRAVENOUS; SUBCUTANEOUS at 10:56

## 2018-07-02 RX ADMIN — SODIUM CHLORIDE 100 ML/HR: 0.9 INJECTION, SOLUTION INTRAVENOUS at 12:18

## 2018-07-02 RX ADMIN — CLONIDINE HYDROCHLORIDE 0.1 MG: 0.1 TABLET ORAL at 13:14

## 2018-07-02 NOTE — DISCHARGE INSTRUCTIONS
1  Please see the post angioplasty discharge instructions  No heavy lifting, greater than 10 lbs  or strenuous activity for 1 week  Follow angioplasty discharge instructions  2 Remove band aid tomorrow  Shower and wash area- wrist gently with soap and water- beginning tomorrow  Rinse and pat dry  Apply new water seal band aid  Repeat this process for 5 days  No powders, creams lotions or antibiotic ointments  for 5 days  No tub baths, hot tubs or swimming for 5 days  3  Call [facility]iology Office ([phone]) if you develop a fever, redness or drainage at your wrist access site  4  No driving for 2 days    5  Do not stop aspirin or Brilinta (Ticagrelor) any reason without a cardiologists consent, or the stent could block up and cause a heart attack  6  Stent card and book  Coronary Intravascular Stent Placement   WHAT YOU SHOULD KNOW:   Coronary intravascular stent placement is a procedure to place a stent in an artery of your heart that has plaque buildup  Plaque is a mixture of fat and cholesterol  A stent is a small mesh tube made of metal that helps keep your artery open  Your caregiver may place a bare metal stent or a drug-eluting stent (SRINIVASA) in your artery  A SRINIVASA is coated with medicine that is slowly released and helps prevent more plaque buildup in the area where the stent is placed  The stent remains in your artery for life  You may need more than one stent  AFTER YOU LEAVE:   Medicines: You will be given any of the following:  · Antiplatelets  prevent blood clots from forming  You will need to take aspirin and another type of platelet medicine  Take this medicine daily as directed  Do not stop taking aspirin or other type of antiplatelet medicine  · Nitrates , such as nitroglycerin, relax the arteries of your heart so it gets more oxygen  This medicine helps to relieve chest pain      · Cholesterol medicine  helps decrease the amount of cholesterol in your blood     · Blood pressure medicine  lowers your blood pressure  · Take your medicine as directed  Call your healthcare provider if you think your medicine is not helping or if you have side effects  Tell him if you are allergic to any medicine  Keep a list of the medicines, vitamins, and herbs you take  Include the amounts, and when and why you take them  Bring the list or the pill bottles to follow-up visits  Carry your medicine list with you in case of an emergency  Follow up with your cardiologist as directed:  Write down your questions so you remember to ask them during your visits  Activity:   · Avoid unnecessary stair climbing for 48 hours, if a catheter was put in your groin  · Do not place pressure on your arm, hand, or wrist, if the catheter was placed in your wrist  Avoid pushing, pulling, or heavy lifting with that arm  · If you need to cough, support the area where the catheter was inserted with your hand  · Ask your cardiologist how long you need to limit movement and avoid certain activities  · You may feel like resting more after your procedure  Slowly start to do more each day  Rest when you feel it is needed  Wound care:  Ask your cardiologist about how to care for your incision wound  Ask when you can get into a tub, shower, or pool  Do not smoke: If you smoke, it is never too late to quit  Smoking increases your risk for heart disease and stroke  Ask your cardiologist for information if you need help quitting  Cardiac rehab:  Your cardiologist may recommend that you attend cardiac rehabilitation (rehab)  This is a program run by specialists who will help you safely strengthen your heart and reduce the risk of more heart disease  The plan includes exercise, relaxation, stress management, and heart-healthy nutrition  Caregivers will also check to make sure any medicines you are taking are working  Contact your cardiologist if:   · You have a fever or chills       · You have questions or concerns about your condition or care  Seek care immediately or call 911 if:   · Your leg or arm, used for the procedure, becomes numb or turns white or blue  · The area where the catheter was placed is swollen, red, or has pus or foul-smelling fluid coming from it  · Your arm or leg feels warm, tender, and painful  It may look swollen and red  · You start to bleed from your catheter site again  · You have any of the following signs of a heart attack:     ¨ Squeezing, pressure, fullness, or pain in your chest that lasts longer than a few minutes or returns     ¨ Discomfort or pain in your back, neck, jaw, stomach, or arm    ¨ Shortness of breath or breathing problems    ¨ A sudden cold sweat, lightheadedness, dizziness, or nausea, especially with chest pain or trouble breathing    · You have any of the following signs of a stroke:     ¨ Part of your face droops or is numb    ¨ Weakness in an arm or leg    ¨ Confusion or difficulty speaking    ¨ Dizziness, a severe headache, or vision loss  © 2014 [employer] is for End User's use only and may not be sold, redistributed or otherwise used for commercial purposes  All illustrations and images included in CareNotes® are the copyrighted property of A [employer] M [employer]  or [employer]  The above information is an  only  It is not intended as medical advice for individual conditions or treatments  Talk to your doctor, nurse or pharmacist before following any medical regimen to see if it is safe and effective for you

## 2018-07-02 NOTE — PROGRESS NOTES
[facility] Hospitalist Service - Internal Medicine Progress Note       PATIENT INFORMATION      Patient: Jono Phipps 72 y o  male   MRN: [id number]  PCP: Mari Maddox MD  Unit/Bed#: MS [facility] Encounter: [id number]  Date Of Visit: 18       ASSESSMENTS & PLAN     NSTEMI type 1    Assessment & Plan    - initial troponin of 0 12 with subsequent plateauing at 8 35 yesterday and remained on heparin drip - initiated beta-blockade with Lopressor - continue ASA and initiated Lipitor (statin)   - left heart catheterization this morning field a 50% mid circumflex stenosis and percent mid RCA stenosis lesion status post drug-eluting stent - Brilinta added for dual anti-platelet coverage  - continue telemetry monitoring - EKG on admission revealed questionable T-wave inversions in the inferior leads   - maintain oxygenation - PRN SL NTG for acute attacks   - lipid panel revealed an LDL of 128 (continue ASA/statin as above) and HgA1c normal at 5 5  - if remains stable on telemetry overnight, can likely discharge home tomorrow      Accelerated hypertension on admission - history of Essential hypertension   Assessment & Plan    - low-sodium diet heavily encouraged  - discontinued Catapres - initiated beta-blockade with Lopressor in light of NSTEMI   - PRN IV Lopressor on board for BP spikes       Cognitive neurologic deficit - Depression - Posttraumatic stress disorder   Assessment & Plan    - continue Abilify/Prozac/Namenda        VTE Prophylaxis:  Heparin drip      SUBJECTIVE     Seen/examined earlier today after heart catheterization  He is resting comfortably complains of some fatigue and a mild headache which is felt to be a side effect of the anesthesia he received earlier  Denies any chest pain or shortness of breath at this time  Remains in hopeful/pleasant spirits        OBJECTIVE     Vitals:   Temp (24hrs), Av 1 °F (36 7 °C), Min:98 °F (36 7 °C), Max:98 1 °F (36 7 °C)    HR:  [46-62] 46  Resp: [14-18] 16  BP: (118-170)/(79-98) 141/83  SpO2:  [96 %-98 %] 96 %  Body mass index is 27 61 kg/m²  Input and Output Summary (last 24 hours):     No intake or output data in the 24 hours ending 07/02/18 1426    Physical Exam:     GENERAL:  Well-developed/nourished - no immediate distress  HEAD:  Normocephalic - atraumatic  EYES: PERRL - EOMI   MOUTH:  Mucosa moist  NECK:  Supple - full range of motion  CARDIAC:  Regular rate/rhythm - S1/S2 positive  PULMONARY:  Clear to auscultation bilaterally - nonlabored respirations  ABDOMEN:  Soft - nontender/nondistended - active bowel sounds  MUSCULOSKELETAL:  Motor strength/range of motion intact  NEUROLOGIC:  Alert/oriented x 3  SKIN:  Age-appropriate wrinkles/blemishes   PSYCHIATRIC:  Mood/affect stable      ADDITIONAL DATA       Labs & Recent Cultures:       Results from last 7 days  Lab Units 07/02/18  0538  06/30/18  1454   WBC Thousand/uL 9 64  < > 7 89   HEMOGLOBIN g/dL 13 9  < > 14 2   HEMATOCRIT % 41 6  < > 41 1   PLATELETS Thousands/uL 173  < > 175   NEUTROS PCT %  --   --  54   LYMPHS PCT %  --   --  26   MONOS PCT %  --   --  12   EOS PCT %  --   --  7*   < > = values in this interval not displayed      Results from last 7 days  Lab Units 07/02/18  0538 06/30/18  1454   SODIUM mmol/L 139 139   POTASSIUM mmol/L 4 1 3 5   CHLORIDE mmol/L 106 104   CO2 mmol/L 25 26   BUN mg/dL 9 12   CREATININE mg/dL 0 82 0 88   CALCIUM mg/dL 8 8 8 9   TOTAL PROTEIN g/dL  --  7 4   BILIRUBIN TOTAL mg/dL  --  0 80   ALK PHOS U/L  --  157*   ALT U/L  --  30   AST U/L  --  22   GLUCOSE RANDOM mg/dL 104 106       Results from last 7 days  Lab Units 06/30/18  1454   INR  0 93         Last 24 Hours Medication List:     Current Facility-Administered Medications:  acetaminophen 650 mg Oral Q6H PRN Loi Randolph MD    ARIPiprazole 5 mg Oral Daily Loi Randolph MD    aspirin 81 mg Oral Daily Loi Randolph MD    atorvastatin 40 mg Oral Daily With MD Tayo Augustin ON 7/3/2018] cloNIDine 0 1 mg Oral Once HS SAMREEN Noland    FLUoxetine 20 mg Oral Daily Mo Barillas MD    heparin (porcine) 3-20 Units/kg/hr (Order-Specific) Intravenous Titrated Nuha Hunter PA-C Last Rate: Stopped (07/02/18 1044)   heparin (porcine) 2,000 Units Intravenous PRN HOLLIE Reyes-BRUCE    heparin (porcine) 4,000 Units Intravenous PRN HOLLIE Reyes-BRUCE    memantine 10 mg Oral BID Mo Barillas MD    metoprolol 5 mg Intravenous Q6H PRN Mo Barillas MD    metoprolol tartrate 25 mg Oral Q12H [address] Mo Barillas MD    nitroglycerin 0 4 mg Sublingual Q5 Min PRN Mo Barillas MD    ondansetron 4 mg Intravenous Q4H PRN Mo Barillas MD    sodium chloride 125 mL/hr Intravenous Continuous SAMREEN Schwartz Last Rate: Stopped (07/02/18 1218)   sodium chloride 100 mL/hr Intravenous Continuous Brody Campuzanoer, DO Last Rate: 100 mL/hr (07/02/18 1218)   ticagrelor 90 mg Oral BID Brody Nagel,            Time Spent for Care: 32 minutes  More than 50% of total time spent on counseling and coordination of care as described above  Current Length of Stay: 2 day(s)      Code Status: Level 1 - Full Code         ** Please Note: This note is constructed using a voice recognition dictation system   **

## 2018-07-03 VITALS
DIASTOLIC BLOOD PRESSURE: 84 MMHG | TEMPERATURE: 97.5 F | WEIGHT: 197.97 LBS | RESPIRATION RATE: 18 BRPM | HEIGHT: 71 IN | OXYGEN SATURATION: 97 % | SYSTOLIC BLOOD PRESSURE: 144 MMHG | BODY MASS INDEX: 27.72 KG/M2 | HEART RATE: 53 BPM

## 2018-07-03 PROCEDURE — 99232 SBSQ HOSP IP/OBS MODERATE 35: CPT | Performed by: INTERNAL MEDICINE

## 2018-07-03 PROCEDURE — 99239 HOSP IP/OBS DSCHRG MGMT >30: CPT | Performed by: INTERNAL MEDICINE

## 2018-07-03 RX ORDER — NITROGLYCERIN 0.4 MG/1
0.4 TABLET SUBLINGUAL
Qty: 90 TABLET | Refills: 0 | Status: SHIPPED | OUTPATIENT
Start: 2018-07-03

## 2018-07-03 RX ORDER — ATORVASTATIN CALCIUM 40 MG/1
40 TABLET, FILM COATED ORAL
Qty: 30 TABLET | Refills: 0 | Status: SHIPPED | OUTPATIENT
Start: 2018-07-03 | End: 2021-09-29

## 2018-07-03 RX ADMIN — TICAGRELOR 90 MG: 90 TABLET ORAL at 09:04

## 2018-07-03 RX ADMIN — FLUOXETINE 20 MG: 20 CAPSULE ORAL at 09:03

## 2018-07-03 RX ADMIN — ASPIRIN 81 MG 81 MG: 81 TABLET ORAL at 09:04

## 2018-07-03 RX ADMIN — METOPROLOL TARTRATE 25 MG: 25 TABLET, FILM COATED ORAL at 09:05

## 2018-07-03 RX ADMIN — MEMANTINE 10 MG: 10 TABLET ORAL at 09:04

## 2018-07-03 RX ADMIN — ARIPIPRAZOLE 5 MG: 5 TABLET ORAL at 09:04

## 2018-07-03 NOTE — PLAN OF CARE
DISCHARGE PLANNING     Discharge to home or other facility with appropriate resources Adequate for Discharge        INFECTION - ADULT     Absence or prevention of progression during hospitalization Adequate for Discharge     Absence of fever/infection during neutropenic period Adequate for Discharge        Knowledge Deficit     Patient/family/caregiver demonstrates understanding of disease process, treatment plan, medications, and discharge instructions Adequate for Discharge        PAIN - ADULT     Verbalizes/displays adequate comfort level or baseline comfort level Adequate for Discharge        Potential for Falls     Patient will remain free of falls Adequate for Discharge        SAFETY ADULT     Patient will remain free of falls Adequate for Discharge     Maintain or return to baseline ADL function Adequate for Discharge     Maintain or return mobility status to optimal level Adequate for Discharge

## 2018-07-03 NOTE — NURSING NOTE
Pt discharge home  Reviewed instructions with pt and wife at bedside  He states understanding on how to take all his meds, not to stop ASA or Brillianta without consulting cardiology, and understands to follow up with pcp, cards, and all medical providers as an outpt  Pt and wife understands the restrictions from s/p cardiac cath and how to perform site care  Pt belongings are gathered and he leaves the floor instable condition

## 2018-07-03 NOTE — PROGRESS NOTES
Cardiology Progress Note - Douglas Clock 72 y o  male MRN: [id number]    Unit/Bed#: -01 Encounter: [id number]        Subjective:    No significant events overnight  Review of Systems   Cardiovascular: Negative for chest pain, leg swelling and palpitations  Respiratory: Negative for shortness of breath  Objective:   Vitals: Blood pressure 144/84, pulse (!) 53, temperature 97 5 °F (36 4 °C), temperature source Oral, resp  rate 18, height 5' 11" (1 803 m), weight 89 8 kg (197 lb 15 6 oz), SpO2 97 %  , Body mass index is 27 61 kg/m² , Orthostatic Blood Pressures      Most Recent Value   Blood Pressure  144/84 filed at 07/03/2018 0705   Patient Position - Orthostatic VS  Lying filed at 07/03/2018 8915         Systolic (64FNW), LMA:862 , Min:94 , WJY:231     Diastolic (39XXU), RAB:88, Min:51, Max:93      Intake/Output Summary (Last 24 hours) at 07/03/18 0911  Last data filed at 07/03/18 0306   Gross per 24 hour   Intake              240 ml   Output              400 ml   Net             -160 ml     Weight (last 2 days)     Date/Time   Weight    07/02/18 0554  89 8 (197 97)              Telemetry Review: Sinus bradycardia    Physical Exam   Constitutional: He appears well-developed  HENT:   Head: Normocephalic  Eyes: Pupils are equal, round, and reactive to light  Neck: Normal range of motion  Cardiovascular: Normal rate, normal heart sounds and intact distal pulses  Pulmonary/Chest: Effort normal    Abdominal: Soft  Musculoskeletal: Normal range of motion  Neurological: He is alert  Skin: Skin is warm  Psychiatric: He has a normal mood and affect           Laboratory Results:    Results from last 7 days  Lab Units 07/01/18  0111 06/30/18  2104 06/30/18  1849   TROPONIN I ng/mL 0 16* 0 16* 0 15*       CBC with diff:   Results from last 7 days  Lab Units 07/02/18  0538 07/01/18  0621 06/30/18  1454   WBC Thousand/uL 9 64 8 42 7 89   HEMOGLOBIN g/dL 13 9 13 9 14 2   HEMATOCRIT % 41 6 41 4 41 1   MCV fL 95 95 95   PLATELETS Thousands/uL 173 162 175   MCH pg 31 9 31 7 32 8   MCHC g/dL 33 4 33 6 34 5   RDW % 12 4 12 5 12 4   MPV fL 9 8 9 6 10 5         CMP:  Results from last 7 days  Lab Units 18  0538 18  1454   SODIUM mmol/L 139 139   POTASSIUM mmol/L 4 1 3 5   CHLORIDE mmol/L 106 104   CO2 mmol/L 25 26   ANION GAP mmol/L 8 9   BUN mg/dL 9 12   CREATININE mg/dL 0 82 0 88   GLUCOSE RANDOM mg/dL 104 106   CALCIUM mg/dL 8 8 8 9   AST U/L  --  22   ALT U/L  --  30   ALK PHOS U/L  --  157*   TOTAL PROTEIN g/dL  --  7 4   BILIRUBIN TOTAL mg/dL  --  0 80   EGFR ml/min/1 73sq m 89 86         BMP:  Results from last 7 days  Lab Units 18  0538 18  1454   SODIUM mmol/L 139 139   POTASSIUM mmol/L 4 1 3 5   CHLORIDE mmol/L 106 104   CO2 mmol/L 25 26   BUN mg/dL 9 12   CREATININE mg/dL 0 82 0 88   GLUCOSE RANDOM mg/dL 104 106   CALCIUM mg/dL 8 8 8 9       BNP:     Magnesium:   Results from last 7 days  Lab Units 18  0538   MAGNESIUM mg/dL 2 2       Coags:   Results from last 7 days  Lab Units 18  0538 18  0304 18  2104 18  1454   PTT seconds 63* 68* 81* 35   INR   --   --   --  0 93       Lipid Profile:     Results from last 7 days  Lab Units 18  0621   CHOLESTEROL mg/dL 187   TRIGLYCERIDES mg/dL 72   HDL mg/dL 45           Cardiac testing:   Results for orders placed during the hospital encounter of 18   Echo complete with contrast if indicated    Narrative [facility], [address] [place] [address]  [phone]    Transthoracic Echocardiogram  2D, M-mode, Doppler, and Color Doppler    Study date:  2018    Patient: Geoff Bingham  MR number: [id number]  Account number: [de-identified]  : 1945  Age: 72 years  Gender: Male  Status: Inpatient  Location: Bedside  Height: 71 in  Weight: 198 lb  BP: 158/ 88 mmHg    Indications: Coronary artery disease      Diagnoses: I25 83 - Coronary atherosclerosis due to lipid violetta plaque    Sonographer:  Anjali Torres  Primary Physician:  Meryle Cartwright  Referring Physician:  SAMREEN Monroe  Group:  [facility]  Interpreting Physician:  Wally Rodrigues MD    SUMMARY    LEFT VENTRICLE:  Systolic function was normal  Ejection fraction was estimated to be 60 %  There were no regional wall motion abnormalities  Doppler parameters were consistent with abnormal left ventricular relaxation (grade 1 diastolic dysfunction)  LEFT ATRIUM:  The atrium was dilated  HISTORY: PRIOR HISTORY: Hypertension, Hyperlipidemia  PROCEDURE: The procedure was performed at the bedside  This was a routine study  The transthoracic approach was used  The study included complete 2D imaging, M-mode, complete spectral Doppler, and color Doppler  The heart rate was 57 bpm,  at the start of the study  Images were obtained from the parasternal, apical, subcostal, and suprasternal notch acoustic windows  Image quality was adequate  LEFT VENTRICLE: Size was normal  Systolic function was normal  Ejection fraction was estimated to be 60 %  There were no regional wall motion abnormalities  Wall thickness was normal  DOPPLER: There was an increased relative contribution  of atrial contraction to ventricular filling  Doppler parameters were consistent with abnormal left ventricular relaxation (grade 1 diastolic dysfunction)  RIGHT VENTRICLE: The size was normal  Systolic function was normal  Wall thickness was normal     LEFT ATRIUM: The atrium was dilated  RIGHT ATRIUM: Size was normal     MITRAL VALVE: Valve structure was normal  There was normal leaflet separation  DOPPLER: The transmitral velocity was within the normal range  There was no evidence for stenosis  There was trace regurgitation  AORTIC VALVE: The valve was trileaflet  Leaflets exhibited normal thickness and normal cuspal separation  DOPPLER: Transaortic velocity was within the normal range   There was no evidence for stenosis  There was no significant  regurgitation  TRICUSPID VALVE: The valve structure was normal  There was normal leaflet separation  DOPPLER: The transtricuspid velocity was within the normal range  There was no evidence for stenosis  There was trace regurgitation  PULMONIC VALVE: Leaflets exhibited normal thickness, no calcification, and normal cuspal separation  DOPPLER: The transpulmonic velocity was within the normal range  There was trace regurgitation  PERICARDIUM: There was no pericardial effusion  The pericardium was normal in appearance  AORTA: The root exhibited normal size  SYSTEMIC VEINS: IVC: The inferior vena cava was not well visualized      SYSTEM MEASUREMENT TABLES    2D  %FS: 32 74 %  AV Diam: 3 72 cm  EDV(Teich): 89 01 ml  EF(Teich): 61 37 %  ESV(Teich): 34 39 ml  IVSd: 1 03 cm  LA Area: 20 91 cm2  LA Diam: 3 83 cm  LVEDV MOD A4C: 134 4 ml  LVEF MOD A4C: 69 62 %  LVESV MOD A4C: 40 83 ml  LVIDd: 4 43 cm  LVIDs: 2 98 cm  LVLd A4C: 8 99 cm  LVLs A4C: 7 21 cm  LVPWd: 0 92 cm  RA Area: 13 72 cm2  RVIDd: 3 33 cm  SV MOD A4C: 93 56 ml  SV(Teich): 54 62 ml    MM  TAPSE: 2 23 cm    PW  E': 0 09 m/s  E/E': 8 88  MV A Papa: 0 66 m/s  MV Dec McCulloch: 3 82 m/s2  MV DecT: 205 86 ms  MV E Papa: 0 79 m/s  MV E/A Ratio: 1 19  MV PHT: 59 7 ms  MVA By PHT: 3 69 cm2    [facility] Accredited [facility]    Prepared and electronically signed by    Aaron Pablo MD  Signed 01-Jul-2018 17:12:11       Meds/Allergies     Current Facility-Administered Medications:  acetaminophen 650 mg Oral Q6H PRN Facundo Gan MD    ARIPiprazole 5 mg Oral Daily Facundo Gan MD    aspirin 81 mg Oral Daily Facundo Gan MD    atorvastatin 40 mg Oral Daily With Adal Ernst MD    cloNIDine 0 1 mg Oral Once HS Orvis Copping, CRNP    FLUoxetine 20 mg Oral Daily Facundo Gan MD    heparin (porcine) 3-20 Units/kg/hr (Order-Specific) Intravenous Titrated Prieto Alanis PA-C Last Rate: Stopped (07/02/18 1044)   heparin (porcine) 2,000 Units Intravenous PRN Dm Pedroza PA-C    heparin (porcine) 4,000 Units Intravenous PRN Hali Pedroza PA-C    memantine 10 mg Oral BID Dakota Girrad MD    metoprolol 5 mg Intravenous Q6H PRN Dakota Girard MD    metoprolol tartrate 25 mg Oral Q12H [facility] Dakota Girard MD    nitroglycerin 0 4 mg Sublingual Q5 Min PRN Dakota Girard MD    ondansetron 4 mg Intravenous Q4H PRN Dakota Girard MD    sodium chloride 125 mL/hr Intravenous Continuous Casimer SAMREEN Cannon Last Rate: Stopped (07/02/18 1218)   ticagrelor 90 mg Oral BID Haja Singleton DO        heparin (porcine) 3-20 Units/kg/hr (Order-Specific) Last Rate: Stopped (07/02/18 1044)   sodium chloride 125 mL/hr Last Rate: Stopped (07/02/18 1218)     Prescriptions Prior to Admission   Medication    ARIPiprazole (ABILIFY) 5 mg tablet    aspirin 81 MG tablet    cloNIDine (CATAPRES) 0 1 mg tablet    FLUoxetine (PROzac) 20 mg capsule    memantine (NAMENDA) 10 mg tablet       Assessment:  Principal Problem:    NSTEMI type 1   Active Problems:    Cognitive neurologic deficit - Depression - Posttraumatic stress disorder    Accelerated hypertension on admission - history of Essential hypertension      Impression:  1  NSTEMI Type I s/p PCI of RCA - doing well  2  Hypertension - will need current dosing of clonidine  Plan:  1  Continue current medications  2  Stable for D/C from cardiac standpoint  Will follow up with his outpatient cardiologist Dr Beatriz Salvador

## 2018-07-03 NOTE — DISCHARGE SUMMARY
Discharge- Clinton Lopez 1945, 72 y o  male MRN: [id number]    Unit/Bed#: -01 Encounter: [id number]    Primary Care Provider: Janette Carr MD   Date and time admitted to hospital: 6/30/2018  1:18 PM        Accelerated hypertension on admission - history of Essential hypertension   Assessment & Plan    low-sodium diet heavily encouraged  initiated beta-blockade with Lopressor in light of NSTEMI   Also continue cataprese on prior dose  Cognitive neurologic deficit - Depression - Posttraumatic stress disorder   Assessment & Plan    continue Abilify/Prozac/Namenda        * NSTEMI type 1    Assessment & Plan    Had cardiac cath and SRINIVASA to RCA yesterday  See below for cath report  Discussed with cardiology they will ensure follow up and also patient to be discharged home on brilinta, statin, and BB  Patient feels back to normal    No complaints  Insertion site in right wrist looks good, no hematoma  Discharging Physician / Practitioner: David Jaime MD  PCP: Janette Carr MD  Admission Date:   Admission Orders     Ordered        06/30/18 1549  Inpatient Admission (expected length of stay for this patient is greater than two midnights)  Once             Discharge Date: 07/03/18    Resolved Problems  Date Reviewed: 7/3/2018    None          Consultations During Hospital Stay:  · Cardiology - Dr Guadalupe Ortiz  Procedures Performed:     · Cardiac cath      IMPRESSIONS:  PCI culprit RCA with SRINIVASA    Echo -   Systolic function was normal  Ejection fraction was estimated to be 60 %  There were no regional wall motion abnormalities  Doppler parameters were consistent with abnormal left ventricular relaxation (grade 1 diastolic dysfunction)  Significant Findings / Test Results:     · As above  Incidental Findings:   · None  Test Results Pending at Discharge (will require follow up): · None  Outpatient Tests Requested:  · None  Complications:  None  Reason for Admission:   Chest pain  Hospital Course:     Amanda Clark is a 72 y o  male patient who originally presented to the hospital on 6/30/2018 due to chest pain  Underwent cardiac cath on 7/2 with SRINIVASA to RCA  Chest pain subsided, started on brilinta, statin and BB  Hospital course was complicated by accelerated  Hypertension on admission and his blood pressure was normalized after cath with stenting and after started on BB and continued home catapres  Please see above list of diagnoses and related plan for additional information  Condition at Discharge: stable     Discharge Day Visit / Exam:     Subjective:      Patient seen and examined  No new symptoms  No chest pain or shortness of breath  Josh George to go home  Vitals: Blood Pressure: 144/84 (07/03/18 0705)  Pulse: (!) 53 (07/03/18 0705)  Temperature: 97 5 °F (36 4 °C) (07/03/18 0705)  Temp Source: Oral (07/03/18 0705)  Respirations: 18 (07/03/18 0705)  Height: 5' 11" (180 3 cm) (07/02/18 0554)  Weight - Scale: 89 8 kg (197 lb 15 6 oz) (07/02/18 0554)  SpO2: 97 % (07/03/18 0705)  Exam:   Physical Exam   Constitutional: He is oriented to person, place, and time  He appears well-developed  No distress  HENT:   Head: Normocephalic and atraumatic  Mouth/Throat: No oropharyngeal exudate  Eyes: Right eye exhibits no discharge  Left eye exhibits no discharge  No scleral icterus  Neck: Normal range of motion  No JVD present  No tracheal deviation present  No thyromegaly present  Cardiovascular: Normal rate and regular rhythm  Exam reveals no gallop and no friction rub  No murmur heard  Pulmonary/Chest: Effort normal  No respiratory distress  He has no wheezes  He has no rales  He exhibits no tenderness  Abdominal: Soft  Bowel sounds are normal  He exhibits no distension and no mass  There is no tenderness  There is no rebound and no guarding  Musculoskeletal: Normal range of motion   He exhibits no edema, tenderness or deformity  Lymphadenopathy:     He has no cervical adenopathy  Neurological: He is alert and oriented to person, place, and time  No cranial nerve deficit  Coordination normal    Skin: Skin is warm  No rash noted  He is not diaphoretic  No erythema  No pallor  Psychiatric: He has a normal mood and affect  Discussion with Family: Discussed with patient  Called wife [place] - no answer  Discharge instructions/Information to patient and family:   See after visit summary for information provided to patient and family  Provisions for Follow-Up Care:  See after visit summary for information related to follow-up care and any pertinent home health orders  Disposition:     Home    For Discharges to   [address] [facility]:   · Not Applicable to this Patient - Not Applicable to this Patient    Planned Readmission: none  Discharge Statement:  I spent 45 minutes discharging the patient  This time was spent on the day of discharge  I had direct contact with the patient on the day of discharge  Greater than 50% of the total time was spent examining patient, answering all patient questions, arranging and discussing plan of care with patient as well as directly providing post-discharge instructions  Additional time then spent on discharge activities  Discharge Medications:  See after visit summary for reconciled discharge medications provided to patient and family        ** Please Note: This note has been constructed using a voice recognition system **

## 2018-07-03 NOTE — SOCIAL WORK
Pt would like scripts filled at [facility]  CM sent scripts to [facility] along with Brilinta coupon as Pt has commercial insurance

## 2018-07-03 NOTE — CASE MANAGEMENT
Notification of Discharge  This is a Notification of Discharge from our facility [address]  Please be advised that this patient has been discharge from our facility  Below you will find the admission and discharge date and time including the patients disposition  PRESENTATION DATE: 6/30/2018  1:18 PM  IP ADMISSION DATE: 6/30/18 1549  DISCHARGE DATE: 7/3/2018 12:19 PM  DISPOSITION: [address] [place] [address] in the [facility] [place] Utilization Review Department  Phone: [phone]; Fax [phone]  ATTENTION: The Network Utilization Review Department is now centralized for our 9 Facilities  Make a note that we have a new phone and fax numbers for our Department  Please call with any questions or concerns to [phone] and carefully follow the prompts so that you are directed to the right person  All voicemails are confidential  Fax any determinations, approvals, denials, and requests for initial or continue stay review clinical to [phone]  Due to HIGH CALL volume, it would be easier if you could please send faxed requests to expedite your requests and in part, help us provide discharge notifications faster    Reference #[id number]

## 2018-07-10 ENCOUNTER — TELEPHONE (OUTPATIENT)
Dept: PSYCHIATRY | Facility: CLINIC | Age: 73
End: 2018-07-10

## 2018-08-05 DIAGNOSIS — F43.23 ACUTE ADJUSTMENT DISORDER WITH MIXED ANXIETY AND DEPRESSED MOOD: ICD-10-CM

## 2018-08-05 DIAGNOSIS — F43.10 PTSD (POST-TRAUMATIC STRESS DISORDER): ICD-10-CM

## 2018-08-06 ENCOUNTER — OFFICE VISIT (OUTPATIENT)
Dept: PSYCHIATRY | Facility: CLINIC | Age: 73
End: 2018-08-06
Payer: COMMERCIAL

## 2018-08-06 VITALS — SYSTOLIC BLOOD PRESSURE: 138 MMHG | DIASTOLIC BLOOD PRESSURE: 88 MMHG | HEART RATE: 54 BPM

## 2018-08-06 DIAGNOSIS — F32.2 MDD (MAJOR DEPRESSIVE DISORDER), SINGLE EPISODE, SEVERE , NO PSYCHOSIS (HCC): ICD-10-CM

## 2018-08-06 DIAGNOSIS — F43.23 ACUTE ADJUSTMENT DISORDER WITH MIXED ANXIETY AND DEPRESSED MOOD: ICD-10-CM

## 2018-08-06 DIAGNOSIS — F43.22 ADJUSTMENT DISORDER WITH ANXIETY: Primary | ICD-10-CM

## 2018-08-06 DIAGNOSIS — F43.10 PTSD (POST-TRAUMATIC STRESS DISORDER): ICD-10-CM

## 2018-08-06 PROCEDURE — 99214 OFFICE O/P EST MOD 30 MIN: CPT | Performed by: PSYCHIATRY & NEUROLOGY

## 2018-08-06 PROCEDURE — 90833 PSYTX W PT W E/M 30 MIN: CPT | Performed by: PSYCHIATRY & NEUROLOGY

## 2018-08-06 RX ORDER — ARIPIPRAZOLE 5 MG/1
TABLET ORAL
Qty: 30 TABLET | Refills: 2 | Status: SHIPPED | OUTPATIENT
Start: 2018-08-06 | End: 2018-08-20 | Stop reason: ALTCHOICE

## 2018-08-06 RX ORDER — FLUOXETINE HYDROCHLORIDE 40 MG/1
40 CAPSULE ORAL DAILY
Qty: 30 CAPSULE | Refills: 3 | Status: SHIPPED | OUTPATIENT
Start: 2018-08-06 | End: 2018-10-08 | Stop reason: ALTCHOICE

## 2018-08-06 RX ORDER — FLUOXETINE HYDROCHLORIDE 20 MG/1
CAPSULE ORAL
Qty: 30 CAPSULE | Refills: 3 | OUTPATIENT
Start: 2018-08-06
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
## 2018-08-20 ENCOUNTER — OFFICE VISIT (OUTPATIENT)
Dept: NEUROLOGY | Facility: CLINIC | Age: 73
End: 2018-08-20
Payer: COMMERCIAL

## 2018-08-20 VITALS
WEIGHT: 204 LBS | DIASTOLIC BLOOD PRESSURE: 78 MMHG | HEIGHT: 71 IN | SYSTOLIC BLOOD PRESSURE: 128 MMHG | BODY MASS INDEX: 28.56 KG/M2

## 2018-08-20 DIAGNOSIS — F51.01 PRIMARY INSOMNIA: ICD-10-CM

## 2018-08-20 DIAGNOSIS — R20.2 NUMBNESS AND TINGLING IN LEFT HAND: Primary | ICD-10-CM

## 2018-08-20 DIAGNOSIS — R20.0 NUMBNESS AND TINGLING IN LEFT HAND: Primary | ICD-10-CM

## 2018-08-20 PROBLEM — G47.00 INSOMNIA: Status: ACTIVE | Noted: 2018-08-20

## 2018-08-20 PROBLEM — R41.3 MEMORY DISTURBANCE: Status: RESOLVED | Noted: 2017-08-01 | Resolved: 2018-08-20

## 2018-08-20 PROCEDURE — 99214 OFFICE O/P EST MOD 30 MIN: CPT | Performed by: PSYCHIATRY & NEUROLOGY

## 2018-08-20 RX ORDER — FLUOXETINE HYDROCHLORIDE 20 MG/1
CAPSULE ORAL
COMMUNITY
Start: 2018-08-20 | End: 2018-10-19 | Stop reason: SDUPTHER

## 2018-08-20 RX ORDER — LISINOPRIL 10 MG/1
TABLET ORAL
COMMUNITY
Start: 2018-08-14 | End: 2019-04-05

## 2018-08-20 NOTE — PATIENT INSTRUCTIONS
Left hand numbness:   will get EMG of his left hand  We could not find the old EMG that was done back in 2016  Insomnia:  Patient is to go see our sleep specialist for a sleep evaluation  Memory difficulties: Patient scored 30/30 on his [place] today  He is still taking Namenda  If he wants to continue taking at a m  Okay with that but I do not think he needs it

## 2018-08-20 NOTE — PROGRESS NOTES
Review of Systems   Constitutional: Negative  HENT: Negative  Eyes: Negative  Respiratory: Negative  Cardiovascular: Negative  Gastrointestinal: Negative  Endocrine: Negative  Genitourinary: Negative  Musculoskeletal: Negative  Skin: Negative  Allergic/Immunologic: Negative  Neurological: Negative  Hematological: Bruises/bleeds easily  Psychiatric/Behavioral: Positive for sleep disturbance (Trouble falling and staying asleep)

## 2018-09-04 ENCOUNTER — TELEPHONE (OUTPATIENT)
Dept: NEUROLOGY | Facility: CLINIC | Age: 73
End: 2018-09-04

## 2018-09-04 NOTE — TELEPHONE ENCOUNTER
adeline from [facility] [employer] called and states that on 8/24 they mailed cam to [facility] office, requesting emg and records   this is under media and was faxed to [facility] on 8/29   i made him aware of this and gave him mro's number to follow up on this request

## 2018-10-03 ENCOUNTER — HOSPITAL ENCOUNTER (OUTPATIENT)
Dept: RADIOLOGY | Facility: CLINIC | Age: 73
Discharge: HOME | End: 2018-10-03
Payer: COMMERCIAL

## 2018-10-03 DIAGNOSIS — R20.2 NUMBNESS AND TINGLING IN LEFT HAND: ICD-10-CM

## 2018-10-03 DIAGNOSIS — R20.0 NUMBNESS AND TINGLING IN LEFT HAND: ICD-10-CM

## 2018-10-03 PROCEDURE — 95886 MUSC TEST DONE W/N TEST COMP: CPT | Performed by: PHYSICAL MEDICINE & REHABILITATION

## 2018-10-03 PROCEDURE — 95908 NRV CNDJ TST 3-4 STUDIES: CPT | Performed by: PHYSICAL MEDICINE & REHABILITATION

## 2018-10-03 NOTE — PROCEDURES
Procedures       Electromyogram and Nerve Conduction Velocity Procedure Note    HX:  This is a 77-year-old left hand dominant male referred by Neurology for repeat electrodiagnostic study regarding left upper extremity symptoms  He reports a history of head injury with concussion 2 years ago  He is describing global numbness  Throughout the left upper extremity with weakness described as difficulty manipulating small items  He also has pain in the left shoulder  He reports a prior  EMG/ NCV study was done shortly after his injury  PMH:   Coronary artery disease status post acute coronary syndrome with stent, he is on antiplatelet therapy    Exam:  On inspection is no atrophy, fasciculations, or tremors  Reflex exam is intact no sensory deficits are noted Tinel sign is negative over the distal median nerve  To manual testing the left  is weak        Procedure:  Verbal informed consent was obtained as with all electrodiagnostic medicine patients  As with all patients this patient was informed that they may terminate the  examine at any time  Patient tolerated the procedure well with no adverse effects reported or observed  Findings:  Please see the [employer] data printout for any details   nerve conduction studies were entirely normal for the left median nerve including motor and sensory fibers, and the left ulnar nerve including motor and sensory fibers  Electromyography:  Monopolar needle exploration failed to reveal any abnormal spontaneous potentials throughout the following left-sided muscles:  Cervical paraspinals, deltoid, biceps, triceps, brachioradialis, abductor pollicis brevis  Motor units were normal for amplitude, duration, and configuration throughout    Recruitment was normal at minimal contraction and most muscles was diminished to maximal effort with some "ratchet" seen in the left brachioradialis , this would be from supra segmental influence IE pain/apprehension etc   Conclusion:    1  There is no electrophysiologic data in the nerves and muscles tested today to indicate or suggest a cervical radiculopathy, brachial plexopathy, large fiber polyneuropathy or entrapment of the median or ulnar nerves  Recommendations:      the prior study reportedly was done at [facility]    And could not be found in epic for comparison    Careful clinical correlation is advised      Salma Davis DO  Diplomate, ABPERLA

## 2018-10-04 DIAGNOSIS — F43.23 ACUTE ADJUSTMENT DISORDER WITH MIXED ANXIETY AND DEPRESSED MOOD: ICD-10-CM

## 2018-10-04 DIAGNOSIS — F32.2 MDD (MAJOR DEPRESSIVE DISORDER), SINGLE EPISODE, SEVERE , NO PSYCHOSIS (HCC): ICD-10-CM

## 2018-10-04 DIAGNOSIS — F43.10 PTSD (POST-TRAUMATIC STRESS DISORDER): ICD-10-CM

## 2018-10-04 RX ORDER — CLONIDINE HYDROCHLORIDE 0.1 MG/1
TABLET ORAL
Qty: 60 TABLET | Refills: 3 | Status: SHIPPED | OUTPATIENT
Start: 2018-10-04 | End: 2019-02-09 | Stop reason: SDUPTHER

## 2018-10-08 ENCOUNTER — OFFICE VISIT (OUTPATIENT)
Dept: SLEEP CENTER | Facility: CLINIC | Age: 73
End: 2018-10-08
Payer: COMMERCIAL

## 2018-10-08 VITALS
HEART RATE: 64 BPM | RESPIRATION RATE: 16 BRPM | WEIGHT: 205 LBS | HEIGHT: 71 IN | SYSTOLIC BLOOD PRESSURE: 128 MMHG | BODY MASS INDEX: 28.7 KG/M2 | DIASTOLIC BLOOD PRESSURE: 76 MMHG

## 2018-10-08 DIAGNOSIS — G47.52 REM BEHAVIORAL DISORDER: ICD-10-CM

## 2018-10-08 DIAGNOSIS — F32.2 MDD (MAJOR DEPRESSIVE DISORDER), SINGLE EPISODE, SEVERE , NO PSYCHOSIS (HCC): ICD-10-CM

## 2018-10-08 DIAGNOSIS — G47.9 SLEEP DISTURBANCE: ICD-10-CM

## 2018-10-08 DIAGNOSIS — F45.8 BRUXISM: ICD-10-CM

## 2018-10-08 DIAGNOSIS — I21.4 NSTEMI, INITIAL EPISODE OF CARE (HCC): ICD-10-CM

## 2018-10-08 DIAGNOSIS — G47.33 OSA (OBSTRUCTIVE SLEEP APNEA): Primary | ICD-10-CM

## 2018-10-08 DIAGNOSIS — E66.3 OVERWEIGHT (BMI 25.0-29.9): ICD-10-CM

## 2018-10-08 DIAGNOSIS — G47.10 HYPERSOMNIA: ICD-10-CM

## 2018-10-08 DIAGNOSIS — I10 ESSENTIAL HYPERTENSION: ICD-10-CM

## 2018-10-08 PROCEDURE — 99244 OFF/OP CNSLTJ NEW/EST MOD 40: CPT | Performed by: INTERNAL MEDICINE

## 2018-10-08 RX ORDER — ATORVASTATIN CALCIUM 20 MG/1
20 TABLET, FILM COATED ORAL DAILY
COMMUNITY
End: 2019-04-05

## 2018-10-08 NOTE — PATIENT INSTRUCTIONS
What you can do to improve your sleep: (Sleep Hygiene) Basic rules for a good night's sleep    Create a regular sleep schedule  This will help you form a sleep routine  Keep a record of your sleep patterns, and any sleeping problems you have  Bring the record to follow-up visits with healthcare providers  Avoid prolonged use of light-emitting screens before bedtime or watching TV in bed  Avoid forcing sleep  Do not take naps  Naps could make it hard for you to fall asleep at bedtime  Deal with your worries before bedtime  Keep your bedroom cool, quiet, and dark  Turn on white noise, such as a fan, to help you relax  Do not use your bed for any activity that will keep you awake  Do not read, exercise, eat, or watch TV in your bedroom  Get up if you do not fall asleep within 20 minutes  Move to another room and do something relaxing until you become sleepy  Limit caffeine, alcohol, nicotine and food to earlier in the day  Only drink caffeine in the morning  Do not drink alcohol within 6 hours of bedtime  Do not eat a heavy meal right before you go to bed  Avoid smoking, especially in the evening  Exercise regularly  Daily exercise will help you sleep better  Do not exercise within 4 hours of bedtime  Stimulus control therapy rules  1  Go to bed only when sleepy  2  Do not watch television, read, eat, or worry while in bed  Use bed only for sleep and sex  3  Get out of bed if unable to fall asleep within 20 minutes and go to another room  Return to bed only when sleepy  Repeat this step as many times as necessary throughout the night  4  Set an alarm clock to wake up at a fixed time each morning, including weekends  5  Do not take a nap during the day  Data from: [address] [place] [address], [employer] Nonpharmacologic treatments of insomnia  J Clin Psychiatry 9160; 53:37  Go to AASM website for more information: Sleepeducation  org     Recommended Reading:  Book by authors Joanna Amor No More sleepless nights    What is CHANTEL? Obstructive sleep apnea is a common and serious sleep disorder that causes you to stop breathing during sleep  The airway repeatedly becomes blocked, limiting the amount of air that reaches your lungs  When this happens, you may snore loudly or making choking noises as you try to breathe  Your brain and body becomes oxygen deprived and you may wake up  This may happen a few times a night, or in more severe cases, several hundred times a night  Sleep apnea can make you wake up in the morning feeling tired or unrefreshed even though you have had a full night of sleep  During the day, you may feel fatigued, have difficulty concentrating or you may even unintentionally fall asleep  This is because your body is waking up numerous times throughout the night, even though you might not be conscious of each awakening  The lack of oxygen your body receives can have negative long-term consequences for your health  This includes:  High blood pressure  Heart disease  Irregular heart rhythms  Stroke  Pre-diabetes and diabetes  Depression    Testing  An objective evaluation of your sleep may be needed before your board certified sleep physician can make a diagnosis  Options include:   In-lab overnight sleep study  This type of sleep study requires you to stay overnight at a sleep center, in a bed that may resemble a hotel room  You will sleep with sensors hooked up to various parts of your body  These sensors record your brain waves, heartbeat, breathing and movement  An overnight sleep study also provides your doctor with the most complete information about your sleep   Learn more about an overnight sleep study      Home sleep apnea test  Some patients with high risk factors for obstructive sleep apnea and no other medical disorders may be candidates for a home sleep apnea test  The testing equipment differs in that it is less complicated than what is used in an overnight sleep study  As such, does not give all the data an in-lab will and if negative, may not mean you do not have the problem  Treatment for sleep apnea  includes using a continuous positive airway pressure (CPAP) machine to keep your airway open during sleep  A mask is placed over your nose and mouth, or just your nose  The mask is hooked to the CPAP machine that blows a gentle stream of air into the mask when you breathe  This helps keep your airway open so you can breathe more regularly  Extra oxygen may be given to you through the machine  You may be given a mouth device  It looks like a mouth guard or dental retainer and stops your tongue and mouth tissues from blocking your throat while you sleep  Surgery may be needed to remove extra tissues that block your mouth, throat, or nose  Manage sleep apnea:   Do not smoke  Nicotine and other chemicals in cigarettes and cigars can cause lung damage  Ask your healthcare provider for information if you currently smoke and need help to quit  E-cigarettes or smokeless tobacco still contain nicotine  Talk to your healthcare provider before you use these products  Do not drink alcohol or take sedative medicine before you go to sleep  Alcohol and sedatives can relax the muscles and tissues around your throat  This can block the airflow to your lungs  Maintain a healthy weight  Excess tissue around your throat may restrict your breathing  Ask your healthcare provider for information if you need to lose weight  Sleep on your side or use pillows designed to prevent sleep apnea  This prevents your tongue or other tissues from blocking your throat  You can also raise the head of your bed  Driving Safety  Refrain from driving when drowsy  Follow up with your healthcare provider as directed:  Write down your questions so you remember to ask them during your visits  Go to AASM website for more information: Sleepeducation  org     What is CHANTEL?    Obstructive sleep apnea is a common and serious sleep disorder that causes you to stop breathing during sleep  The airway repeatedly becomes blocked, limiting the amount of air that reaches your lungs  When this happens, you may snore loudly or making choking noises as you try to breathe  Your brain and body becomes oxygen deprived and you may wake up  This may happen a few times a night, or in more severe cases, several hundred times a night  Sleep apnea can make you wake up in the morning feeling tired or unrefreshed even though you have had a full night of sleep  During the day, you may feel fatigued, have difficulty concentrating or you may even unintentionally fall asleep  This is because your body is waking up numerous times throughout the night, even though you might not be conscious of each awakening  The lack of oxygen your body receives can have negative long-term consequences for your health  This includes:  High blood pressure  Heart disease  Irregular heart rhythms  Stroke  Pre-diabetes and diabetes  Depression    Testing  An objective evaluation of your sleep may be needed before your board certified sleep physician can make a diagnosis  Options include:   In-lab overnight sleep study  This type of sleep study requires you to stay overnight at a sleep center, in a bed that may resemble a hotel room  You will sleep with sensors hooked up to various parts of your body  These sensors record your brain waves, heartbeat, breathing and movement  An overnight sleep study also provides your doctor with the most complete information about your sleep  Learn more about an overnight sleep study      Home sleep apnea test  Some patients with high risk factors for obstructive sleep apnea and no other medical disorders may be candidates for a home sleep apnea test  The testing equipment differs in that it is less complicated than what is used in an overnight sleep study   As such, does not give all the data an in-lab will and if negative, may not mean you do not have the problem  Treatment for sleep apnea  includes using a continuous positive airway pressure (CPAP) machine to keep your airway open during sleep  A mask is placed over your nose and mouth, or just your nose  The mask is hooked to the CPAP machine that blows a gentle stream of air into the mask when you breathe  This helps keep your airway open so you can breathe more regularly  Extra oxygen may be given to you through the machine  You may be given a mouth device  It looks like a mouth guard or dental retainer and stops your tongue and mouth tissues from blocking your throat while you sleep  Surgery may be needed to remove extra tissues that block your mouth, throat, or nose  Manage sleep apnea:   Do not smoke  Nicotine and other chemicals in cigarettes and cigars can cause lung damage  Ask your healthcare provider for information if you currently smoke and need help to quit  E-cigarettes or smokeless tobacco still contain nicotine  Talk to your healthcare provider before you use these products  Do not drink alcohol or take sedative medicine before you go to sleep  Alcohol and sedatives can relax the muscles and tissues around your throat  This can block the airflow to your lungs  Maintain a healthy weight  Excess tissue around your throat may restrict your breathing  Ask your healthcare provider for information if you need to lose weight  Sleep on your side or use pillows designed to prevent sleep apnea  This prevents your tongue or other tissues from blocking your throat  You can also raise the head of your bed  Driving Safety  Refrain from driving when drowsy  Follow up with your healthcare provider as directed:  Write down your questions so you remember to ask them during your visits  Go to AASM website for more information: Sleepeducation  org
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
## 2018-10-08 NOTE — PROGRESS NOTES
Review of Systems      Genitourinary need to urinate more than twice a night   Cardiology none   Gastrointestinal frequent heartburn/acid reflux and abdominal pain or cramping that disturb sleep    Neurology numbness/tingling of an extremity and balance problems   Constitutional fatigue   Integumentary none   Psychiatry none   Musculoskeletal joint pain   Pulmonary shortness of breath with activity, frequent cough and snoring   ENT throat clearing   Endocrine none   Hematological none

## 2018-10-19 DIAGNOSIS — F43.23 ACUTE ADJUSTMENT DISORDER WITH MIXED ANXIETY AND DEPRESSED MOOD: ICD-10-CM

## 2018-10-19 DIAGNOSIS — F43.10 PTSD (POST-TRAUMATIC STRESS DISORDER): Primary | ICD-10-CM

## 2018-10-19 DIAGNOSIS — F43.10 PTSD (POST-TRAUMATIC STRESS DISORDER): ICD-10-CM

## 2018-10-19 DIAGNOSIS — F32.2 MDD (MAJOR DEPRESSIVE DISORDER), SINGLE EPISODE, SEVERE , NO PSYCHOSIS (HCC): ICD-10-CM

## 2018-10-19 RX ORDER — FLUOXETINE HYDROCHLORIDE 20 MG/1
CAPSULE ORAL
Qty: 30 CAPSULE | Refills: 1 | OUTPATIENT
Start: 2018-10-19

## 2018-10-19 RX ORDER — FLUOXETINE HYDROCHLORIDE 20 MG/1
40 CAPSULE ORAL DAILY
Qty: 60 CAPSULE | Refills: 1 | Status: SHIPPED | OUTPATIENT
Start: 2018-10-19 | End: 2019-03-04 | Stop reason: SDUPTHER

## 2018-10-22 ENCOUNTER — CLINICAL SUPPORT (OUTPATIENT)
Dept: CARDIAC REHAB | Facility: CLINIC | Age: 73
End: 2018-10-22
Payer: COMMERCIAL

## 2018-10-22 VITALS — HEIGHT: 71 IN | WEIGHT: 203.2 LBS | BODY MASS INDEX: 28.45 KG/M2

## 2018-10-22 DIAGNOSIS — I21.4 NSTEMI, INITIAL EPISODE OF CARE (HCC): Primary | ICD-10-CM

## 2018-10-22 PROCEDURE — 93797 PHYS/QHP OP CAR RHAB WO ECG: CPT

## 2018-10-22 NOTE — PROGRESS NOTES
CARDIAC REHAB ASSESSMENT    Today's date: 2018  Patient name: Khoi Steele     : 1945       MRN: [id number]  PCP: Viridiana Thompson MD  Referring Physician: Shane Fontanez DO  Cardiologist: Booker Quiroz DO  Surgeon:   Dx:   Encounter Diagnosis   Name Primary?  NSTEMI type 1  Yes       Date of onset: 2018  Cultural needs:     Height:    Wt Readings from Last 1 Encounters:   10/22/18 92 2 kg (203 lb 3 2 oz)      Weight:   Ht Readings from Last 1 Encounters:   10/22/18 5' 11" (1 803 m)     Medical History:   Past Medical History:   Diagnosis Date    Concussion     Heart attack ([facility] [address] )     Hypertension          Physical Limitations: N/A    Risk Factors   Cholesterol: Yes  Smoking: Quit 50 yrs ago   HTN: Yes  DM: No  Obesity: Yes   Inactivity: Yes  Family History:  Family History   Problem Relation Age of Onset    Family history unknown: Yes       Allergies: Codeine; Hydrocodone; Other; Oxycodone; and Prednisone  ETOH:   History   Alcohol Use    7 2 oz/week    12 Cans of beer per week     Comment: 1 bottle of wine a week         Current Medications:   Current Outpatient Prescriptions   Medication Sig Dispense Refill    aspirin 81 MG tablet Take 81 mg by mouth      atorvastatin (LIPITOR) 20 mg tablet Take 20 mg by mouth daily      atorvastatin (LIPITOR) 40 mg tablet Take 1 tablet (40 mg total) by mouth daily with dinner 30 tablet 0    cloNIDine (CATAPRES) 0 1 mg tablet TAKE 0 1MG (1 TAB) NIGHTLY  AFTER 4-5 DAYS, MAY INCREASE TO 0 2MG (2TAB) NIGHTLY 60 tablet 3    FLUoxetine (PROzac) 20 mg capsule Take 2 capsules (40 mg total) by mouth daily 60 capsule 1    lisinopril (ZESTRIL) 10 mg tablet       memantine (NAMENDA) 10 mg tablet TAKE 1 TABLET TWICE DAILY FOR MEMORY   180 tablet 1    metoprolol tartrate (LOPRESSOR) 25 mg tablet Take 1 tablet (25 mg total) by mouth every 12 (twelve) hours 60 tablet 0    nitroglycerin (NITROSTAT) 0 4 mg SL tablet Place 1 tablet (0 4 mg total) under the tongue every 5 (five) minutes as needed for chest pain 90 tablet 0    ticagrelor (BRILINTA) 90 MG Take 1 tablet (90 mg total) by mouth 2 (two) times a day 60 tablet 0     No current facility-administered medications for this visit  Functional Status Prior to Diagnosis for Treatment   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   Recreation: photography, bonsai tree, read, sailing   ADLs: resumed all ADLs  [place]: No limitations  Exercise: None  Other:     Current Functional Status  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  Recreation: photography, bonsai tree, reading   ADLs:resumed all ADLs  [place]: No limitations  Exercise: walking 30-40 minutes outside   Other:     Short Term Program Goals: increased strength improved energy/stamina with ADLs exercise 120-150 mins/wk    Long Term Goals: increased maximal walking duration  increased intial training workload  Improved [facility] Activity Status score  Improved functional capacity  Improved Quality of Life - [facility] score reduced  Improved lipid profile  Reduced body fat%  improved Rate Your Plate Score    Ability to reach goals/rehabilitation potential:  Excellent    Projected return to function: 12 weeks  Objective tests: sub-max TM ETT      Nutritional   Reviewed details of Rate your Plate  Discussed key elements of heart healthy eating  Reviewed patient goals for dietary modifications and their clinical implications  Reviewed most recent lipid profile       Goals for dietary modification: low fat dairy   reduced fat cheese  increase whole grains  increase fruits and vegetables  low sodium  improved snack choices  reduce sweets/frozen desserts  heathier choices while dining out      Emotional/Social  Patient reports he/she is coping well with good social support and denies depression or anxiety    SOCIAL SUPPORT NETWORK    Marital status:     Rate 1-5:    Marriage: 5   Family: 3   Financial: 5   Relationships: 5   Spirituality: 5   Intellectual: 4    Perceived Stress: 5/10   Stressors:wifes parents are getting older and adding some health issues to them   Goals for Stress Management:Increase sense of well-being     Domestic Violence Screening: No    Comments:

## 2018-10-22 NOTE — PROGRESS NOTES
Cardiac Rehabilitation Plan of Care   Care Plan       Today's date: 10/22/2018   Visits: 1  Patient name: Barbara Cedillo      : 1945  Age: 72 y o  MRN: [id number]  Referring Physician: Equilla Rubinstein, DO  Provider: [address] [place] [address] Cardiopulmonary Rehab   Clinician: Merlyn Acharya MS, CEP     Dx:   Encounter Diagnosis   Name Primary?  NSTEMI type 1  Yes     Date of onset: 2018      SUMMARY OF PROGRESS:  Today was Jose F's initial evaluation for cardiac rehab  Caterina Pelayo completed a sub-maximal treadmill test walking 11 minutes at 5 8 METs  Caterina Pelayo reports to have some SOB at peak exercise but states that it just from the exercise and not cardiac related  Caterina Pelayo does states that the SOB resolved in recovery  Caterina Pelayo states that he has resumed back to work full-time and has been doing well since returning  Jose F's biggest goal for cardiac rehab is to prevent another heart attack in the future  Caterina Pelayo reports to be a vegetarian and eats a ton of nuts, fruits, veggies and will eat dairy  I talked to Caterina Pelayo about watching the dairy and trying to pick reduced fat or low-fat options  Caterina Pelayo states to have excellent social support from friends and family  Caterina Pelayo states that he has mostly positive stress in his life but with his wife's parents getting older they are starting to have some health issues that affect his life  Overall, Caterina Pelayo is excited to start cardiac rehab and will begin 10/25          Medication compliance: Yes   Comments:   Fall Risk: Low   Comments:     EKG changes: NSR      EXERCISE ASSESSMENT and PLAN    Current Exercise Program in Rehab:       Frequency: 3x/week        Minutes: 30-40         METS: 5-7            HR:    RPE: 4-5         Modalities: Treadmill, Airdyne bike, UBE, Lifecycle and Eliptical       Exercise Progression 30 Day Goals :    Frequency: 3x/week   Minutes: 30-40   METS: 5-7   HR:     RPE: 4-5   Modalities: Treadmill, Airdyne bike, UBE, Lifecycle and Eliptical     Strength training: Will be added following at least 8 weeks post surgery and 8-10 monitored sessions   Modalities: Leg Press, Chest Press, Pull Downs, Lateral Raise, Arm Curl and Seated Row    Progressing: In Progress    Home Exercise: Type: walking, Frequency: 3x/week, Duration: 30-40 mins    Goals: 10% improvement in functional capacity, improved DASI score by 10%, Increase in peak CR METs by 40%, Resume ADLs with increased strength and Exercise 5 days/wk, >150mins/wk  Education: Benefit of exercise for CAD risk factors, home exercise guidelines, signs and sxs, RPE scale and class: Risk Factors for Heart Disease   Plan:home exercise 30+ mins 2 days opposite CR  Readiness to change: Preparation      NUTRITION ASSESSMENT AND PLAN    Weight control:    Starting weight: 203 2 lbs   Current weight: Weight - Scale: 92 2 kg (203 lb 3 2 oz)   Waist circumference:    Startin in   Current: Waist circumference (inches): 39 Inches  Diabetes: N/A  Lipid management: Discussed diet and lipid management  Goals:reduced BMI to < 25, LDL <100, HDL >40, TRG <150, CHOL <200, decreased body fat% <25%    and Improved Rate Your Plate score  >38  Education: heart healthy eating  low sodium diet  diet and lipid management  healthy choices while dining out  portion control  class: Heart Healthy Eating  class:  Label Reading  Progressing: In Progress  Plan: Increase PUFA and MUFA, Decrease SFA, Increase whole grains, increase fruits/vegs, eliminate processed meats, reduce red meat 1x/wk, swtich to low fat dairy and Reduce added sugars <25g/day  Readiness to change: Preparation      PSYCHOSOCIAL ASSESSMENT AND PLAN    Emotional: PHQ-9 Total Score: 4            1-4 = Minimal Depression  Self-reported stress level: 5   Social support: Excellent  Goals:  Reduce perceived stress to 1-3/10, improved [facility] QOL < 27, PHQ-9 - reduced severity by one level, Physical Fitness in [facility] Score < 3, Daily Activity in [facility] Score < 3 and Overall Health in [facility] Score < 3  Education: class:  Stress and Your Health  and class:  Relaxation  Progressing: In Progress  Plan: Practice relaxation techniques  Readiness to change: Preparation      OTHER CORE COMPONENTS     Tobacco:   History   Smoking Status    Never Smoker   Smokeless Tobacco    Never Used       Tobacco Use Intervention: Quit 50 years ago     Blood pressure:    Restin/80   Exercise: 170/80    Goals: consistent BP < 130/80, reduced dietary sodium <2300mg and consistent exercise >150 mins/wk  Education:  understanding HTN and CAD, low sodium diet and HTN, Education class:  Common Heart Medications and Education class: Understanding Heart Disease  Progressing: In Progress  Plan: Monitor BP daily  Readiness to change: Preparation

## 2018-10-22 NOTE — PROGRESS NOTES
Mauricio De Paz   1945     Risk: moderate     Pre Post % Change Goal   Date:  10/22/18      Physical       Sub Max ETT (mets) 5 8   10% increase   6MWT (feet)     10% increase   [facility] Dyspnea Score     5 pt decrease   Supplemental O2 use (L)        [facility] (est peak O2) Total Score: 32 2      Peak exercise CR/CO (mets)    40% increase   Emotional       PHQ9 (> 10 refer to MD) PHQ-9 Total Score: 4     4 pt decrease   [place] (lower score = improvement)       Total Total: 21   < 27   Feelings Feelings: Slightly   < 3   Physical Fitness Physical Fitness: Moderate   < 3   Social Support Social Support: Yes, as much as I needed   < 3   Daily Activities Daily Activity: Some difficulty   < 3   Social Activities Social Activities: Slightly   < 3   Pain Pain: Very mild pain   < 3   Overall Health Overall Health: Fair   < 3   Quality of Life Quality of Life: Pretty good   < 3   Change in Health Change in Health: A little better   < 3   Dietary       Rate your plate 59    > 58   Measurements       Weight Weight - Scale: 92 2 kg (203 lb 3 2 oz)    2 5 - 5%   BMI 28 3    19 - 25   Waist Circ   Waist circumference (inches): 39 Inches   < 40 M / < 35 F   % Body fat Body Fat %: 34 5 %   < 25 M / < 33 F   BP left arm               (systolic) 820   < 900   (diastolic) 80   < 90   Smoking #/day  (if applicable) Quit   0   Lipids/Glucose (Date) N/A      Total cholesterol    50 - 200   Triglycerides    < 150   HDL    40 - 60   LDL    < 100   A1C    4 0 - 5 6%   Fasting BG

## 2018-10-25 ENCOUNTER — CLINICAL SUPPORT (OUTPATIENT)
Dept: CARDIAC REHAB | Facility: CLINIC | Age: 73
End: 2018-10-25
Payer: COMMERCIAL

## 2018-10-25 DIAGNOSIS — I21.4 NSTEMI, INITIAL EPISODE OF CARE (HCC): ICD-10-CM

## 2018-10-25 PROCEDURE — 93798 PHYS/QHP OP CAR RHAB W/ECG: CPT

## 2018-10-26 ENCOUNTER — CLINICAL SUPPORT (OUTPATIENT)
Dept: CARDIAC REHAB | Facility: CLINIC | Age: 73
End: 2018-10-26
Payer: COMMERCIAL

## 2018-10-26 DIAGNOSIS — I21.4 NSTEMI, INITIAL EPISODE OF CARE (HCC): ICD-10-CM

## 2018-10-26 PROCEDURE — 93798 PHYS/QHP OP CAR RHAB W/ECG: CPT

## 2018-10-30 ENCOUNTER — TELEPHONE (OUTPATIENT)
Dept: PSYCHIATRY | Facility: CLINIC | Age: 73
End: 2018-10-30

## 2018-10-30 ENCOUNTER — CLINICAL SUPPORT (OUTPATIENT)
Dept: CARDIAC REHAB | Facility: CLINIC | Age: 73
End: 2018-10-30
Payer: COMMERCIAL

## 2018-10-30 DIAGNOSIS — I21.4 NSTEMI, INITIAL EPISODE OF CARE (HCC): ICD-10-CM

## 2018-10-30 PROCEDURE — 93798 PHYS/QHP OP CAR RHAB W/ECG: CPT

## 2018-10-30 NOTE — TELEPHONE ENCOUNTER
----- Message from Aditya Olivas III, DO sent at 10/30/2018 12:16 PM EDT -----  Please call patient, they were a no call/no show today  Please have them reschedule new appointment  Keep me posted  Thanks!

## 2018-11-01 ENCOUNTER — DOCUMENTATION (OUTPATIENT)
Dept: PSYCHIATRY | Facility: CLINIC | Age: 73
End: 2018-11-01

## 2018-11-01 ENCOUNTER — CLINICAL SUPPORT (OUTPATIENT)
Dept: CARDIAC REHAB | Facility: CLINIC | Age: 73
End: 2018-11-01
Payer: COMMERCIAL

## 2018-11-01 DIAGNOSIS — I21.4 NSTEMI, INITIAL EPISODE OF CARE (HCC): ICD-10-CM

## 2018-11-01 PROCEDURE — 93798 PHYS/QHP OP CAR RHAB W/ECG: CPT

## 2018-11-02 ENCOUNTER — CLINICAL SUPPORT (OUTPATIENT)
Dept: CARDIAC REHAB | Facility: CLINIC | Age: 73
End: 2018-11-02
Payer: COMMERCIAL

## 2018-11-02 DIAGNOSIS — I21.4 NSTEMI, INITIAL EPISODE OF CARE (HCC): ICD-10-CM

## 2018-11-02 PROCEDURE — 93798 PHYS/QHP OP CAR RHAB W/ECG: CPT

## 2018-11-06 ENCOUNTER — APPOINTMENT (OUTPATIENT)
Dept: CARDIAC REHAB | Facility: CLINIC | Age: 73
End: 2018-11-06
Payer: COMMERCIAL

## 2018-11-08 ENCOUNTER — CLINICAL SUPPORT (OUTPATIENT)
Dept: CARDIAC REHAB | Facility: CLINIC | Age: 73
End: 2018-11-08
Payer: COMMERCIAL

## 2018-11-08 DIAGNOSIS — I21.4 NSTEMI, INITIAL EPISODE OF CARE (HCC): ICD-10-CM

## 2018-11-08 PROCEDURE — 93798 PHYS/QHP OP CAR RHAB W/ECG: CPT

## 2018-11-09 ENCOUNTER — CLINICAL SUPPORT (OUTPATIENT)
Dept: CARDIAC REHAB | Facility: CLINIC | Age: 73
End: 2018-11-09
Payer: COMMERCIAL

## 2018-11-09 DIAGNOSIS — I21.4 NSTEMI, INITIAL EPISODE OF CARE (HCC): ICD-10-CM

## 2018-11-09 PROCEDURE — 93798 PHYS/QHP OP CAR RHAB W/ECG: CPT

## 2018-11-13 ENCOUNTER — APPOINTMENT (OUTPATIENT)
Dept: CARDIAC REHAB | Facility: CLINIC | Age: 73
End: 2018-11-13
Payer: COMMERCIAL

## 2018-11-15 ENCOUNTER — APPOINTMENT (OUTPATIENT)
Dept: CARDIAC REHAB | Facility: CLINIC | Age: 73
End: 2018-11-15
Payer: COMMERCIAL

## 2018-11-16 ENCOUNTER — APPOINTMENT (OUTPATIENT)
Dept: CARDIAC REHAB | Facility: CLINIC | Age: 73
End: 2018-11-16
Payer: COMMERCIAL

## 2018-11-20 ENCOUNTER — APPOINTMENT (OUTPATIENT)
Dept: CARDIAC REHAB | Facility: CLINIC | Age: 73
End: 2018-11-20
Payer: COMMERCIAL

## 2018-11-23 ENCOUNTER — APPOINTMENT (OUTPATIENT)
Dept: CARDIAC REHAB | Facility: CLINIC | Age: 73
End: 2018-11-23
Payer: COMMERCIAL

## 2018-11-23 NOTE — PROGRESS NOTES
Cardiac Rehabilitation Plan of Care   30 day       Today's date: 2018   Visits: 9  Patient name: Mimi Recio      : 1945  Age: 72 y o  MRN: [id number]  Referring Physician: Georgi Sanchez DO  Provider: [facility]   Clinician: Vesna Hollingsworth MS, CEP     Dx:   Encounter Diagnosis   Name Primary?  NSTEMI, initial episode of care [facility])      Date of onset: 2018      SUMMARY OF PROGRESS:  Today Counts include 234 beds at the [facility]'s 30 day note for cardiac rehab  Lux Bazzi has progressed to 40 minutes of cardio at 3 5-4 5 METs  Lux Bazzi will start weight training next week  Lux Bazzi reports to have no cardiac complications during exercise  Lux Bazzi reports to not be doing any structured exercise on opposite days of rehab  I expressed to Lux Bazzi the importance of doing exercise on opposite days of rehab and how it will help him even more  Lux Bazzi has not been to cardiac rehab the last two weeks due to weather, work, and vacation  I am hoping Lux Bazzi is more compliant in the future with rehab  Lux Bazzi states that he has resumed back to work full-time and has been doing well since returning  Jose F's biggest goal for cardiac rehab is to prevent another heart attack in the future  Lux Bazzi reports to be a vegetarian and eats a ton of nuts, fruits, veggies and will eat dairy  I talked to Lux Bazzi about watching the dairy and trying to pick reduced fat or low-fat options  Lux Bazzi states to have excellent social support from friends and family  Lux Bazzi states that he has mostly positive stress in his life but with his wife's parents getting older they are starting to have some health issues that affect his life  Overall, Lux Bazzi is excited to keep progressing in rehab       Medication compliance: Yes   Comments:   Fall Risk: Low   Comments:     EKG changes: NSR      EXERCISE ASSESSMENT and PLAN    Current Exercise Program in Rehab:       Frequency: 3x/week        Minutes: 40        METS: 3 5-4 5            HR:    RPE: 4-5         Modalities: Treadmill, Airdyne bike, UBE and Lifecycle      Exercise Progression 30 Day Goals :    Frequency: 3x/week   Minutes: 40-45   METS: 5-7   HR:     RPE: 4-5   Modalities: Treadmill, Airdyne bike, UBE, Lifecycle and Eliptical     Strength training:  Will be added following at least 8 weeks post surgery and 8-10 monitored sessions   Modalities: Leg Press, Chest Press, Pull Downs, Lateral Raise, Arm Curl and Seated Row    Progressing:  No has not met his 30 day goal due to calling out a lot because of work     Home Exercise: Type: walking, Frequency: 3x/week, Duration: 30-40 mins    Goals: 10% improvement in functional capacity, improved DASI score by 10%, Increase in peak CR METs by 40%, Resume ADLs with increased strength and Exercise 5 days/wk, >150mins/wk  Education: Benefit of exercise for CAD risk factors, home exercise guidelines, signs and sxs, RPE scale and class: Risk Factors for Heart Disease   Plan:home exercise 30+ mins 2 days opposite CR  Readiness to change: Action      NUTRITION ASSESSMENT AND PLAN    Weight control:    Starting weight: 203 2 lbs   Current weight: 205 lbs  Waist circumference:    Startin in   Current:    Diabetes: N/A  Lipid management: Discussed diet and lipid management  Goals:reduced BMI to < 25, LDL <100, HDL >40, TRG <150, CHOL <200, decreased body fat% <25%    and Improved Rate Your Plate score  >08  Education: heart healthy eating  low sodium diet  diet and lipid management  healthy choices while dining out  portion control  class: Heart Healthy Eating  class:  Label Reading  Progressing: Yes has met his 30 day goal he is still following the heart healthy diet   Plan: Increase PUFA and MUFA, Decrease SFA, Increase whole grains, increase fruits/vegs, eliminate processed meats, reduce red meat 1x/wk, swtich to low fat dairy and Reduce added sugars <25g/day  Readiness to change: Action      PSYCHOSOCIAL ASSESSMENT AND PLAN    Emotional: Patient reports he/she is coping well with good social support and denies depression or anxiety          1-4 = Minimal Depression  Self-reported stress level: 5   Social support: Excellent  Goals:  Reduce perceived stress to 1-3/10, improved [facility] QOL < 27, PHQ-9 - reduced severity by one level, Physical Fitness in [facility] Score < 3, Daily Activity in [facility] Score < 3 and Overall Health in [facility] Score < 3  Education: class:  Stress and Your Health  and class:  Relaxation  Progressing: Yes has met his 30 day goal and states that he is learning how to control the stress in his life   Plan: Practice relaxation techniques  Readiness to change: Action      OTHER CORE COMPONENTS     Tobacco:   History   Smoking Status    Never Smoker   Smokeless Tobacco    Never Used       Tobacco Use Intervention: Quit 50 years ago     Blood pressure:    Restin/60   Exercise: 122/70    Goals: consistent BP < 130/80, reduced dietary sodium <2300mg and consistent exercise >150 mins/wk  Education:  understanding HTN and CAD, low sodium diet and HTN, Education class:  Common Heart Medications and Education class: Understanding Heart Disease  Progressing: Yes has met his 30 day goal and his BP has been more controlled     Plan: Monitor BP daily  Readiness to change: Action

## 2018-11-27 ENCOUNTER — APPOINTMENT (OUTPATIENT)
Dept: CARDIAC REHAB | Facility: CLINIC | Age: 73
End: 2018-11-27
Payer: COMMERCIAL

## 2018-12-06 ENCOUNTER — TELEPHONE (OUTPATIENT)
Dept: SLEEP CENTER | Facility: CLINIC | Age: 73
End: 2018-12-06

## 2018-12-18 NOTE — TELEPHONE ENCOUNTER
CBC insurance authorizations are done through [employer]  All clinical information including office notes, medications, and insurance cards are uploaded for review  I have sent a new Auth request  If denied again options are Peer to Peer or HST

## 2018-12-23 DIAGNOSIS — F43.10 PTSD (POST-TRAUMATIC STRESS DISORDER): ICD-10-CM

## 2018-12-23 DIAGNOSIS — F32.2 MDD (MAJOR DEPRESSIVE DISORDER), SINGLE EPISODE, SEVERE , NO PSYCHOSIS (HCC): ICD-10-CM

## 2018-12-24 ENCOUNTER — TELEPHONE (OUTPATIENT)
Dept: PSYCHIATRY | Facility: CLINIC | Age: 73
End: 2018-12-24

## 2018-12-24 NOTE — TELEPHONE ENCOUNTER
Spoke with [facility] pharmacist who stated that he can fill 40 mg once daily (has RX for this script should [facility] need medication)  [facility] was called and  left with instructions to call his pharmacy should he need Prozac

## 2018-12-28 NOTE — TELEPHONE ENCOUNTER
According to pharmacist Umesh Thomas has medication refills there  Umesh Thomas has not returned call to date

## 2018-12-30 RX ORDER — FLUOXETINE HYDROCHLORIDE 20 MG/1
CAPSULE ORAL
Qty: 60 CAPSULE | Refills: 1 | OUTPATIENT
Start: 2018-12-30

## 2019-02-09 DIAGNOSIS — F43.10 PTSD (POST-TRAUMATIC STRESS DISORDER): ICD-10-CM

## 2019-02-09 DIAGNOSIS — F32.2 MDD (MAJOR DEPRESSIVE DISORDER), SINGLE EPISODE, SEVERE , NO PSYCHOSIS (HCC): ICD-10-CM

## 2019-02-09 DIAGNOSIS — F43.23 ACUTE ADJUSTMENT DISORDER WITH MIXED ANXIETY AND DEPRESSED MOOD: ICD-10-CM

## 2019-02-11 RX ORDER — CLONIDINE HYDROCHLORIDE 0.1 MG/1
TABLET ORAL
Qty: 60 TABLET | Refills: 0 | Status: SHIPPED | OUTPATIENT
Start: 2019-02-11 | End: 2019-04-02 | Stop reason: SDUPTHER

## 2019-02-15 DIAGNOSIS — F32.2 MDD (MAJOR DEPRESSIVE DISORDER), SINGLE EPISODE, SEVERE , NO PSYCHOSIS (HCC): ICD-10-CM

## 2019-02-15 DIAGNOSIS — F43.23 ACUTE ADJUSTMENT DISORDER WITH MIXED ANXIETY AND DEPRESSED MOOD: ICD-10-CM

## 2019-02-15 DIAGNOSIS — F43.10 PTSD (POST-TRAUMATIC STRESS DISORDER): ICD-10-CM

## 2019-02-19 DIAGNOSIS — F43.10 PTSD (POST-TRAUMATIC STRESS DISORDER): ICD-10-CM

## 2019-02-19 DIAGNOSIS — F43.23 ACUTE ADJUSTMENT DISORDER WITH MIXED ANXIETY AND DEPRESSED MOOD: ICD-10-CM

## 2019-02-21 NOTE — TELEPHONE ENCOUNTER
Still no word from him, Refill for clonidine and prozac  I have tried, and others have tried to reach him  Please try to reach patient, and let me know what doses he is taking of prozac and clonidine  Remind him he needs an appointment  Thank you

## 2019-02-22 RX ORDER — FLUOXETINE HYDROCHLORIDE 20 MG/1
CAPSULE ORAL
Qty: 30 CAPSULE | Refills: 1 | OUTPATIENT
Start: 2019-02-22

## 2019-02-26 ENCOUNTER — DOCUMENTATION (OUTPATIENT)
Dept: PSYCHIATRY | Facility: CLINIC | Age: 74
End: 2019-02-26

## 2019-02-26 NOTE — PROGRESS NOTES
Called pharmacy, Lea Ramos has had clonidine refilled on 2/12, no prozac since November  I have tried, as has my office to reach him many times  Pharmacist will try to reach him, advise him to contact our office, make an appointment  I am also sending a letter to the patient

## 2019-02-28 ENCOUNTER — DOCUMENTATION (OUTPATIENT)
Dept: PSYCHIATRY | Facility: CLINIC | Age: 74
End: 2019-02-28

## 2019-02-28 RX ORDER — CLONIDINE HYDROCHLORIDE 0.1 MG/1
TABLET ORAL
Qty: 60 TABLET | Refills: 0 | OUTPATIENT
Start: 2019-02-28

## 2019-02-28 NOTE — PROGRESS NOTES
Nursing left message with patient to call office regarding refills and a follow up visit with Dr Medina Dela Cruz

## 2019-03-04 ENCOUNTER — TELEPHONE (OUTPATIENT)
Dept: PSYCHIATRY | Facility: CLINIC | Age: 74
End: 2019-03-04

## 2019-03-04 DIAGNOSIS — F32.2 MDD (MAJOR DEPRESSIVE DISORDER), SINGLE EPISODE, SEVERE , NO PSYCHOSIS (HCC): ICD-10-CM

## 2019-03-04 DIAGNOSIS — F43.10 PTSD (POST-TRAUMATIC STRESS DISORDER): ICD-10-CM

## 2019-03-04 DIAGNOSIS — F43.23 ACUTE ADJUSTMENT DISORDER WITH MIXED ANXIETY AND DEPRESSED MOOD: ICD-10-CM

## 2019-03-04 RX ORDER — FLUOXETINE HYDROCHLORIDE 20 MG/1
20 CAPSULE ORAL DAILY
Qty: 30 CAPSULE | Refills: 0 | Status: SHIPPED | OUTPATIENT
Start: 2019-03-04 | End: 2019-04-02 | Stop reason: SDUPTHER

## 2019-03-04 RX ORDER — LORAZEPAM 1 MG/1
1 TABLET ORAL 2 TIMES DAILY PRN
Qty: 10 TABLET | Refills: 0 | Status: SHIPPED | OUTPATIENT
Start: 2019-03-04 | End: 2019-11-08 | Stop reason: SDUPTHER

## 2019-03-04 NOTE — TELEPHONE ENCOUNTER
Patient called wanting to inform you that he currently takes Clonidine 0 1 mg and Prozac 20 mg  He is asking for a refill of just the Prozac 20 mg  He mentioned that he is going to [place] on Friday and would need this medication before then  He also asked if you could prescribe Xanax for his upcoming flight also  He scheduled an appointment for 4/5

## 2019-03-04 NOTE — TELEPHONE ENCOUNTER
Taking prozac 20mg daily  Going to [place], [facility] to [facility] last time, looking to set up     Also taking clonidine  Back to work full time, physically feeling better  Anxiety on planes, travel  Has taken xanax and ativan in past and was effective  Agreeable to ativan  Start Ativan 1mg BID PRN anxiety #10  Discussed with patient the risks of sedation, respiratory depression, impairment of ability to drive and potential for abuse and addiction  Patient was also informed of risks of being on or starting opioid medications but he is allergic

## 2019-04-02 DIAGNOSIS — F32.2 MDD (MAJOR DEPRESSIVE DISORDER), SINGLE EPISODE, SEVERE , NO PSYCHOSIS (HCC): ICD-10-CM

## 2019-04-02 DIAGNOSIS — F43.10 PTSD (POST-TRAUMATIC STRESS DISORDER): ICD-10-CM

## 2019-04-02 DIAGNOSIS — F43.23 ACUTE ADJUSTMENT DISORDER WITH MIXED ANXIETY AND DEPRESSED MOOD: ICD-10-CM

## 2019-04-02 RX ORDER — FLUOXETINE HYDROCHLORIDE 20 MG/1
CAPSULE ORAL
Qty: 30 CAPSULE | Refills: 0 | Status: SHIPPED | OUTPATIENT
Start: 2019-04-02 | End: 2019-04-05 | Stop reason: SDUPTHER

## 2019-04-02 RX ORDER — CLONIDINE HYDROCHLORIDE 0.1 MG/1
TABLET ORAL
Qty: 60 TABLET | Refills: 0 | Status: SHIPPED | OUTPATIENT
Start: 2019-04-02 | End: 2019-04-05 | Stop reason: SDUPTHER

## 2019-04-04 ENCOUNTER — DOCUMENTATION (OUTPATIENT)
Dept: PSYCHIATRY | Facility: CLINIC | Age: 74
End: 2019-04-04

## 2019-04-05 ENCOUNTER — OFFICE VISIT (OUTPATIENT)
Dept: PSYCHIATRY | Facility: CLINIC | Age: 74
End: 2019-04-05
Payer: COMMERCIAL

## 2019-04-05 VITALS — HEART RATE: 48 BPM | SYSTOLIC BLOOD PRESSURE: 143 MMHG | DIASTOLIC BLOOD PRESSURE: 86 MMHG

## 2019-04-05 DIAGNOSIS — F43.10 PTSD (POST-TRAUMATIC STRESS DISORDER): ICD-10-CM

## 2019-04-05 DIAGNOSIS — F32.0 MDD (MAJOR DEPRESSIVE DISORDER), SINGLE EPISODE, MILD (HCC): ICD-10-CM

## 2019-04-05 DIAGNOSIS — F43.23 ACUTE ADJUSTMENT DISORDER WITH MIXED ANXIETY AND DEPRESSED MOOD: ICD-10-CM

## 2019-04-05 PROCEDURE — 90833 PSYTX W PT W E/M 30 MIN: CPT | Performed by: PSYCHIATRY & NEUROLOGY

## 2019-04-05 PROCEDURE — 99214 OFFICE O/P EST MOD 30 MIN: CPT | Performed by: PSYCHIATRY & NEUROLOGY

## 2019-04-05 RX ORDER — CLONIDINE HYDROCHLORIDE 0.1 MG/1
0.1 TABLET ORAL
Qty: 30 TABLET | Refills: 3 | Status: SHIPPED | OUTPATIENT
Start: 2019-04-05 | End: 2019-08-20 | Stop reason: SDUPTHER

## 2019-04-05 RX ORDER — LOSARTAN POTASSIUM 50 MG/1
50 TABLET ORAL DAILY
COMMUNITY

## 2019-04-05 RX ORDER — FLUOXETINE HYDROCHLORIDE 40 MG/1
40 CAPSULE ORAL DAILY
Qty: 30 CAPSULE | Refills: 3 | Status: SHIPPED | OUTPATIENT
Start: 2019-04-05 | End: 2021-09-14 | Stop reason: SDUPTHER

## 2019-04-10 ENCOUNTER — HOSPITAL ENCOUNTER (EMERGENCY)
Facility: HOSPITAL | Age: 74
Discharge: HOME | End: 2019-04-10
Attending: EMERGENCY MEDICINE | Admitting: EMERGENCY MEDICINE
Payer: COMMERCIAL

## 2019-04-10 VITALS
BODY MASS INDEX: 27.67 KG/M2 | RESPIRATION RATE: 18 BRPM | DIASTOLIC BLOOD PRESSURE: 87 MMHG | TEMPERATURE: 97.9 F | HEART RATE: 55 BPM | OXYGEN SATURATION: 97 % | SYSTOLIC BLOOD PRESSURE: 169 MMHG | WEIGHT: 198.41 LBS

## 2019-04-10 DIAGNOSIS — K06.8 GINGIVAL BLEEDING: Primary | ICD-10-CM

## 2019-04-10 DIAGNOSIS — K08.409 H/O TOOTH EXTRACTION: ICD-10-CM

## 2019-04-10 DIAGNOSIS — Z79.02 LONG TERM (CURRENT) USE OF ANTITHROMBOTICS/ANTIPLATELETS: ICD-10-CM

## 2019-04-10 LAB
ABO GROUP BLD: NORMAL
ANION GAP SERPL CALCULATED.3IONS-SCNC: 8 MMOL/L (ref 4–13)
APTT PPP: 35 SECONDS (ref 26–38)
BASOPHILS # BLD AUTO: 0.05 THOUSANDS/ΜL (ref 0–0.1)
BASOPHILS NFR BLD AUTO: 1 % (ref 0–1)
BLD GP AB SCN SERPL QL: NEGATIVE
BUN SERPL-MCNC: 15 MG/DL (ref 5–25)
CALCIUM SERPL-MCNC: 8.7 MG/DL (ref 8.3–10.1)
CHLORIDE SERPL-SCNC: 107 MMOL/L (ref 100–108)
CO2 SERPL-SCNC: 24 MMOL/L (ref 21–32)
CREAT SERPL-MCNC: 0.82 MG/DL (ref 0.6–1.3)
EOSINOPHIL # BLD AUTO: 0.54 THOUSAND/ΜL (ref 0–0.61)
EOSINOPHIL NFR BLD AUTO: 7 % (ref 0–6)
ERYTHROCYTE [DISTWIDTH] IN BLOOD BY AUTOMATED COUNT: 11.9 % (ref 11.6–15.1)
GFR SERPL CREATININE-BSD FRML MDRD: 89 ML/MIN/1.73SQ M
GLUCOSE SERPL-MCNC: 106 MG/DL (ref 65–140)
HCT VFR BLD AUTO: 40.6 % (ref 36.5–49.3)
HGB BLD-MCNC: 14 G/DL (ref 12–17)
IMM GRANULOCYTES # BLD AUTO: 0.03 THOUSAND/UL (ref 0–0.2)
IMM GRANULOCYTES NFR BLD AUTO: 0 % (ref 0–2)
INR PPP: 0.99 (ref 0.86–1.17)
LYMPHOCYTES # BLD AUTO: 1.83 THOUSANDS/ΜL (ref 0.6–4.47)
LYMPHOCYTES NFR BLD AUTO: 22 % (ref 14–44)
MCH RBC QN AUTO: 32.6 PG (ref 26.8–34.3)
MCHC RBC AUTO-ENTMCNC: 34.5 G/DL (ref 31.4–37.4)
MCV RBC AUTO: 94 FL (ref 82–98)
MONOCYTES # BLD AUTO: 0.82 THOUSAND/ΜL (ref 0.17–1.22)
MONOCYTES NFR BLD AUTO: 10 % (ref 4–12)
NEUTROPHILS # BLD AUTO: 5.08 THOUSANDS/ΜL (ref 1.85–7.62)
NEUTS SEG NFR BLD AUTO: 60 % (ref 43–75)
NRBC BLD AUTO-RTO: 0 /100 WBCS
PLATELET # BLD AUTO: 160 THOUSANDS/UL (ref 149–390)
PMV BLD AUTO: 9.5 FL (ref 8.9–12.7)
POTASSIUM SERPL-SCNC: 4.1 MMOL/L (ref 3.5–5.3)
PROTHROMBIN TIME: 12.8 SECONDS (ref 11.8–14.2)
RBC # BLD AUTO: 4.3 MILLION/UL (ref 3.88–5.62)
RH BLD: POSITIVE
SODIUM SERPL-SCNC: 139 MMOL/L (ref 136–145)
SPECIMEN EXPIRATION DATE: NORMAL
WBC # BLD AUTO: 8.35 THOUSAND/UL (ref 4.31–10.16)

## 2019-04-10 PROCEDURE — 85610 PROTHROMBIN TIME: CPT | Performed by: PHYSICIAN ASSISTANT

## 2019-04-10 PROCEDURE — 99283 EMERGENCY DEPT VISIT LOW MDM: CPT

## 2019-04-10 PROCEDURE — 86900 BLOOD TYPING SEROLOGIC ABO: CPT | Performed by: PHYSICIAN ASSISTANT

## 2019-04-10 PROCEDURE — 36415 COLL VENOUS BLD VENIPUNCTURE: CPT | Performed by: PHYSICIAN ASSISTANT

## 2019-04-10 PROCEDURE — 85730 THROMBOPLASTIN TIME PARTIAL: CPT | Performed by: PHYSICIAN ASSISTANT

## 2019-04-10 PROCEDURE — 12011 RPR F/E/E/N/L/M 2.5 CM/<: CPT | Performed by: PHYSICIAN ASSISTANT

## 2019-04-10 PROCEDURE — 86850 RBC ANTIBODY SCREEN: CPT | Performed by: PHYSICIAN ASSISTANT

## 2019-04-10 PROCEDURE — 80048 BASIC METABOLIC PNL TOTAL CA: CPT | Performed by: PHYSICIAN ASSISTANT

## 2019-04-10 PROCEDURE — 85025 COMPLETE CBC W/AUTO DIFF WBC: CPT | Performed by: PHYSICIAN ASSISTANT

## 2019-04-10 PROCEDURE — 86901 BLOOD TYPING SEROLOGIC RH(D): CPT | Performed by: PHYSICIAN ASSISTANT

## 2019-04-10 PROCEDURE — 99283 EMERGENCY DEPT VISIT LOW MDM: CPT | Performed by: PHYSICIAN ASSISTANT

## 2019-04-10 RX ORDER — LIDOCAINE HYDROCHLORIDE AND EPINEPHRINE 10; 10 MG/ML; UG/ML
10 INJECTION, SOLUTION INFILTRATION; PERINEURAL ONCE
Status: COMPLETED | OUTPATIENT
Start: 2019-04-10 | End: 2019-04-10

## 2019-04-10 RX ORDER — TRANEXAMIC ACID 100 MG/ML
500 INJECTION, SOLUTION INTRAVENOUS ONCE
Status: COMPLETED | OUTPATIENT
Start: 2019-04-10 | End: 2019-04-10

## 2019-04-10 RX ADMIN — LIDOCAINE HYDROCHLORIDE AND EPINEPHRINE 10 ML: 10; 10 INJECTION, SOLUTION INFILTRATION; PERINEURAL at 03:21

## 2019-04-10 RX ADMIN — TRANEXAMIC ACID 500 MG: 1 INJECTION, SOLUTION INTRAVENOUS at 03:21

## 2019-05-07 DIAGNOSIS — F43.23 ACUTE ADJUSTMENT DISORDER WITH MIXED ANXIETY AND DEPRESSED MOOD: ICD-10-CM

## 2019-05-07 DIAGNOSIS — F32.2 MDD (MAJOR DEPRESSIVE DISORDER), SINGLE EPISODE, SEVERE , NO PSYCHOSIS (HCC): ICD-10-CM

## 2019-05-07 DIAGNOSIS — F43.10 PTSD (POST-TRAUMATIC STRESS DISORDER): ICD-10-CM

## 2019-05-07 RX ORDER — FLUOXETINE HYDROCHLORIDE 20 MG/1
CAPSULE ORAL
Qty: 30 CAPSULE | Refills: 0 | OUTPATIENT
Start: 2019-05-07

## 2019-05-07 RX ORDER — CLONIDINE HYDROCHLORIDE 0.1 MG/1
TABLET ORAL
Qty: 60 TABLET | Refills: 0 | OUTPATIENT
Start: 2019-05-07

## 2019-05-17 ENCOUNTER — DOCUMENTATION (OUTPATIENT)
Dept: PSYCHIATRY | Facility: CLINIC | Age: 74
End: 2019-05-17

## 2019-06-17 NOTE — PROGRESS NOTES
Cardiac Rehabilitation Plan of Care   Discharge Note      Today's date: 2019   Visits: 8  Patient name: Martha Arango      : 1945  Age: 69 y o  MRN: [id number]  Referring Physician: Nima Huff DO  Provider: TIMOTHY BARRIENTOS  [facility] Cardiopulmonary Rehab   Clinician: Adrian Soriano MS, CEP     Dx:   Encounter Diagnosis   Name Primary?  NSTEMI, initial episode of care [facility])      Date of onset: 2018    Hansel Mcdowell is being discharge from cardiac rehab due to non compliance and not coming  Hansel Mcdowell was called several times and did not return any of our calls  Hansel Mcdowell was not able to come in to get his final outcomes  Jose F's 30 day note was also done the same day and is attached  SUMMARY OF PROGRESS:  Today was Jose F's initial evaluation for cardiac rehab  Hansel Mcdowell completed a sub-maximal treadmill test walking 11 minutes at 5 8 METs  Hansel Mcdowell reports to have some SOB at peak exercise but states that it just from the exercise and not cardiac related  Hansel Mcdowell does states that the SOB resolved in recovery  Hansel Mcdowell states that he has resumed back to work full-time and has been doing well since returning  Jose F's biggest goal for cardiac rehab is to prevent another heart attack in the future  Hansel Mcdowell reports to be a vegetarian and eats a ton of nuts, fruits, veggies and will eat dairy  I talked to Hansel Mcdowell about watching the dairy and trying to pick reduced fat or low-fat options  Hansel Mcdowell states to have excellent social support from friends and family  Hansel Mcdowell states that he has mostly positive stress in his life but with his wife's parents getting older they are starting to have some health issues that affect his life  Overall, Hansel Mcdowell is excited to start cardiac rehab and will begin 10/25          Medication compliance: Yes   Comments:   Fall Risk: Low   Comments:     EKG changes: NSR      EXERCISE ASSESSMENT and PLAN    Current Exercise Program in Rehab:       Frequency: 3x/week        Minutes: 30-40         METS: 5-7            HR:    RPE: 4-5         Modalities: Treadmill, Airdyne bike, UBE, Lifecycle and Eliptical       Exercise Progression 30 Day Goals :    Frequency: 3x/week   Minutes: 30-40   METS: 5-7   HR:     RPE: 4-5   Modalities: Treadmill, Airdyne bike, UBE, Lifecycle and Eliptical     Strength training: Will be added following at least 8 weeks post surgery and 8-10 monitored sessions   Modalities: Leg Press, Chest Press, Pull Downs, Lateral Raise, Arm Curl and Seated Row    Progressing: In Progress    Home Exercise: Type: walking, Frequency: 3x/week, Duration: 30-40 mins    Goals: 10% improvement in functional capacity, improved DASI score by 10%, Increase in peak CR METs by 40%, Resume ADLs with increased strength and Exercise 5 days/wk, >150mins/wk  Education: Benefit of exercise for CAD risk factors, home exercise guidelines, signs and sxs, RPE scale and class: Risk Factors for Heart Disease   Plan:home exercise 30+ mins 2 days opposite CR  Readiness to change: Preparation      NUTRITION ASSESSMENT AND PLAN    Weight control:    Starting weight: 203 2 lbs   Current weight:     Waist circumference:    Startin in   Current:    Diabetes: N/A  Lipid management: Discussed diet and lipid management  Goals:reduced BMI to < 25, LDL <100, HDL >40, TRG <150, CHOL <200, decreased body fat% <25%    and Improved Rate Your Plate score  >84  Education: heart healthy eating  low sodium diet  diet and lipid management  healthy choices while dining out  portion control  class: Heart Healthy Eating  class:  Label Reading  Progressing: In Progress  Plan: Increase PUFA and MUFA, Decrease SFA, Increase whole grains, increase fruits/vegs, eliminate processed meats, reduce red meat 1x/wk, swtich to low fat dairy and Reduce added sugars <25g/day  Readiness to change: Preparation      PSYCHOSOCIAL ASSESSMENT AND PLAN    Emotional:              1-4 = Minimal Depression  Self-reported stress level: 5 Social support: Excellent  Goals:  Reduce perceived stress to 1-3/10, improved [facility] QOL < 27, PHQ-9 - reduced severity by one level, Physical Fitness in [facility] Score < 3, Daily Activity in [facility] Score < 3 and Overall Health in [facility] Score < 3  Education: class:  Stress and Your Health  and class:  Relaxation  Progressing: In Progress  Plan: Practice relaxation techniques  Readiness to change: Preparation      OTHER CORE COMPONENTS     Tobacco:   Social History     Tobacco Use   Smoking Status Never Smoker   Smokeless Tobacco Never Used       Tobacco Use Intervention: Quit 50 years ago     Blood pressure:    Restin/80   Exercise: 170/80    Goals: consistent BP < 130/80, reduced dietary sodium <2300mg and consistent exercise >150 mins/wk  Education:  understanding HTN and CAD, low sodium diet and HTN, Education class:  Common Heart Medications and Education class: Understanding Heart Disease  Progressing: In Progress  Plan: Monitor BP daily  Readiness to change: Preparation

## 2019-06-25 ENCOUNTER — DOCUMENTATION (OUTPATIENT)
Dept: PSYCHIATRY | Facility: CLINIC | Age: 74
End: 2019-06-25

## 2019-06-28 ENCOUNTER — EVALUATION (OUTPATIENT)
Dept: PHYSICAL THERAPY | Facility: CLINIC | Age: 74
End: 2019-06-28
Payer: COMMERCIAL

## 2019-06-28 ENCOUNTER — TRANSCRIBE ORDERS (OUTPATIENT)
Dept: PHYSICAL THERAPY | Facility: CLINIC | Age: 74
End: 2019-06-28

## 2019-06-28 DIAGNOSIS — G89.29 CHRONIC RIGHT SHOULDER PAIN: ICD-10-CM

## 2019-06-28 DIAGNOSIS — M72.2 PLANTAR FASCIAL FIBROMATOSIS: Primary | ICD-10-CM

## 2019-06-28 DIAGNOSIS — M72.2 PLANTAR FASCIITIS: Primary | ICD-10-CM

## 2019-06-28 DIAGNOSIS — M25.511 CHRONIC RIGHT SHOULDER PAIN: ICD-10-CM

## 2019-06-28 PROCEDURE — 97162 PT EVAL MOD COMPLEX 30 MIN: CPT | Performed by: PHYSICAL THERAPIST

## 2019-07-01 ENCOUNTER — OFFICE VISIT (OUTPATIENT)
Dept: PHYSICAL THERAPY | Facility: CLINIC | Age: 74
End: 2019-07-01
Payer: COMMERCIAL

## 2019-07-01 DIAGNOSIS — G89.29 CHRONIC RIGHT SHOULDER PAIN: ICD-10-CM

## 2019-07-01 DIAGNOSIS — M72.2 PLANTAR FASCIITIS: Primary | ICD-10-CM

## 2019-07-01 DIAGNOSIS — M25.511 CHRONIC RIGHT SHOULDER PAIN: ICD-10-CM

## 2019-07-01 PROCEDURE — 97140 MANUAL THERAPY 1/> REGIONS: CPT | Performed by: PHYSICAL THERAPIST

## 2019-07-01 PROCEDURE — 97110 THERAPEUTIC EXERCISES: CPT | Performed by: PHYSICAL THERAPIST

## 2019-07-01 NOTE — PROGRESS NOTES
Daily Note     Today's date: 2019  Patient name: Clinton Lopez  : 1945  MRN: [id number]  Referring provider: Gloria Shelton MD  Dx:   Encounter Diagnosis     ICD-10-CM    1  Plantar fasciitis M72 2    2  Chronic right shoulder pain M25 511     G89 29                   Subjective: Pt presents today stating compliance with HEP, he was feeling well until this AM went for a walk may have hurt his foot, and was using his arm a few times may have bothered him as well  Objective: See treatment diary below      Assessment:  Shoulder fatigued quickly, was educated about DOMs and expectation of fatigue > 48 hours session was too challenging  Continue to progress pending outcomes of n v       Precautions: Hx NSTEMI Stent, HTN, Hx of Concussion, Hx of Skin CA, Pagents Diseased     Daily Treatment Diary       Manual            PROM R shoulder  (gentle)   5 min           AP/Inf Mobs Grade 2-4  t/o           DF and Great toe ST L  5           Sub Talor Mobs  5                        Exercise Diary             Pulleys  3 min           Ankle Pumps HEP            Gastroc ST with Strp L 30" x4 30" x 4           Great Toe ST L 30" x 4 HEP            Table Slide Flex + abd 6" x 10 6" x 10           Scap Add 10" x 10 10" x 10           Scaption trial  1 x 10           MWM IR ST             HR/TR             Towel Scrunch  4 x 10                                                                                                                                                          Modalities             HP to Shoulder/Foot prn   declined

## 2019-07-09 ENCOUNTER — OFFICE VISIT (OUTPATIENT)
Dept: PHYSICAL THERAPY | Facility: CLINIC | Age: 74
End: 2019-07-09
Payer: COMMERCIAL

## 2019-07-09 DIAGNOSIS — M72.2 PLANTAR FASCIITIS: Primary | ICD-10-CM

## 2019-07-09 DIAGNOSIS — M25.511 CHRONIC RIGHT SHOULDER PAIN: ICD-10-CM

## 2019-07-09 DIAGNOSIS — G89.29 CHRONIC RIGHT SHOULDER PAIN: ICD-10-CM

## 2019-07-09 PROCEDURE — 97110 THERAPEUTIC EXERCISES: CPT

## 2019-07-09 PROCEDURE — 97112 NEUROMUSCULAR REEDUCATION: CPT

## 2019-07-09 PROCEDURE — 97140 MANUAL THERAPY 1/> REGIONS: CPT

## 2019-07-09 NOTE — PROGRESS NOTES
Daily Note     Today's date: 2019  Patient name: Amanda Clark  : 1945  MRN: [id number]  Referring provider: Megha Garcia MD  Dx:   Encounter Diagnosis     ICD-10-CM    1  Plantar fasciitis M72 2    2  Chronic right shoulder pain M25 511     G89 29        Start Time: 1730  Stop Time: 1820  Total time in clinic (min): 50 minutes    Subjective: Pt reports that he has had no change in status with his R shoulder pain  Pt notes that his foot has gotten a little better since last visit  Objective: See treatment diary below      Assessment:   Tolerated session fair  Pain with gentle PROM FLEX and ABD past 90 degrees  Active ROM scaption limited to 90 degrees this session due to pain  Trialed HR/TR without pain  Plan: Continue per PT POC     Precautions: Hx NSTEMI Stent, HTN, Hx of Concussion, Hx of Skin CA, Pagents Diseased     Daily Treatment Diary       Manual  07/09          PROM R shoulder  (gentle)   5 min 5'           AP/Inf Mobs Grade 2-4  t/o RH          DF and Great toe ST L  5 5'          Sub Talor Mobs  5 RH                       Exercise Diary             Pulleys  3 min 4 min           Ankle Pumps HEP            Gastroc ST with Strp L 30" x4 30" x 4 30"x4 supine           Great Toe ST L 30" x 4 HEP            Table Slide Flex + abd 6" x 10 6" x 10 10x10"          Scap Add 10" x 10 10" x 10 10x10"           Scaption trial  1 x 10 2x10           MWM IR ST             HR/TR   10x ea          Towel Scrunch  4 x 10 4x10                                                                                                                                                          Modalities             HP to Shoulder/Foot prn   declined

## 2019-07-11 ENCOUNTER — APPOINTMENT (OUTPATIENT)
Dept: PHYSICAL THERAPY | Facility: CLINIC | Age: 74
End: 2019-07-11
Payer: COMMERCIAL

## 2019-07-16 ENCOUNTER — APPOINTMENT (OUTPATIENT)
Dept: PHYSICAL THERAPY | Facility: CLINIC | Age: 74
End: 2019-07-16
Payer: COMMERCIAL

## 2019-07-16 ENCOUNTER — OFFICE VISIT (OUTPATIENT)
Dept: PHYSICAL THERAPY | Facility: CLINIC | Age: 74
End: 2019-07-16
Payer: COMMERCIAL

## 2019-07-16 DIAGNOSIS — G89.29 CHRONIC RIGHT SHOULDER PAIN: ICD-10-CM

## 2019-07-16 DIAGNOSIS — M72.2 PLANTAR FASCIITIS: Primary | ICD-10-CM

## 2019-07-16 DIAGNOSIS — M25.511 CHRONIC RIGHT SHOULDER PAIN: ICD-10-CM

## 2019-07-16 PROCEDURE — 97140 MANUAL THERAPY 1/> REGIONS: CPT | Performed by: PHYSICAL THERAPIST

## 2019-07-16 PROCEDURE — 97110 THERAPEUTIC EXERCISES: CPT | Performed by: PHYSICAL THERAPIST

## 2019-07-16 NOTE — PROGRESS NOTES
Daily Note     Today's date: 2019  Patient name: Maura Bright  : 1945  MRN: [id number]  Referring provider: Corina Merida MD  Dx:   Encounter Diagnosis     ICD-10-CM    1  Plantar fasciitis M72 2    2  Chronic right shoulder pain M25 511     G89 29                   Subjective: Most functional difficulty reaching behind her back      Objective: See treatment diary below  Pt shown IR Strap stretch for HEP  Assessment: Tolerated treatment well  Patient would benefit from continued PT      Plan: Continue per plan of care  Precautions: Hx NSTEMI Stent, HTN, Hx of Concussion, Hx of Skin CA, Pagents Diseased     Daily Treatment Diary       Manual          PROM R shoulder  (gentle)   5 min 5'  5'         AP/Inf Mobs Grade 2-4  t/o RH 5'         DF and Great toe ST L  5 5' 5'         Sub Talor Mobs  5 RH 5'                      Exercise Diary             Pulleys  3 min 4 min  4'         Ankle Pumps HEP            Gastroc ST with Strp L 30" x4 30" x 4 30"x4 supine  :30/4         Great Toe ST L 30" x 4 HEP            Table Slide Flex + abd 6" x 10 6" x 10 10x10" :10/10         Scap Add 10" x 10 10" x 10 10x10"  :10/10         Scaption trial  1 x 10 2x10  2x10         MWM IR ST    :10/10         HR/TR   10x ea 10x         Towel Scrunch  4 x 10 4x10  4x10                                                                                                                                                        Modalities             HP to Shoulder/Foot prn   declined

## 2019-07-18 ENCOUNTER — APPOINTMENT (OUTPATIENT)
Dept: PHYSICAL THERAPY | Facility: CLINIC | Age: 74
End: 2019-07-18
Payer: COMMERCIAL

## 2019-07-25 ENCOUNTER — APPOINTMENT (OUTPATIENT)
Dept: PHYSICAL THERAPY | Facility: CLINIC | Age: 74
End: 2019-07-25
Payer: COMMERCIAL

## 2019-07-25 ENCOUNTER — OFFICE VISIT (OUTPATIENT)
Dept: PHYSICAL THERAPY | Facility: CLINIC | Age: 74
End: 2019-07-25
Payer: COMMERCIAL

## 2019-07-25 DIAGNOSIS — M72.2 PLANTAR FASCIITIS: Primary | ICD-10-CM

## 2019-07-25 DIAGNOSIS — G89.29 CHRONIC RIGHT SHOULDER PAIN: ICD-10-CM

## 2019-07-25 DIAGNOSIS — M25.511 CHRONIC RIGHT SHOULDER PAIN: ICD-10-CM

## 2019-07-25 PROCEDURE — 97140 MANUAL THERAPY 1/> REGIONS: CPT | Performed by: PHYSICAL THERAPIST

## 2019-07-25 PROCEDURE — 97110 THERAPEUTIC EXERCISES: CPT | Performed by: PHYSICAL THERAPIST

## 2019-07-25 PROCEDURE — 97112 NEUROMUSCULAR REEDUCATION: CPT | Performed by: PHYSICAL THERAPIST

## 2019-07-25 NOTE — PROGRESS NOTES
Daily Note     Today's date: 2019  Patient name: Suzanna Carrington  : 1945  MRN: [id number]  Referring provider: Aman Liz MD  Dx:   Encounter Diagnosis     ICD-10-CM    1  Plantar fasciitis M72 2    2  Chronic right shoulder pain M25 511     G89 29                   Subjective: Pt presents today stating shoulder is improving, foot is likely going to be addressed for injection with a podiatrist   Pt reports overall he is content with his progression  Objective: See treatment diary below  Pt shown IR Strap stretch for HEP  Assessment: Pt proceeded with CKC based intervention without symptom exacerbation  Consider progression of resistance band work n v  As able  Plan: Continue per plan of care  Precautions: Hx NSTEMI Stent, HTN, Hx of Concussion, Hx of Skin CA, Pagents Diseased  Daily Treatment Diary       Manual         PROM R shoulder  (gentle)   5 min 5'  5' 5 min        AP/Inf Mobs Grade 2-4  t/o RH 5' 5min        DF and Great toe ST L  5 5' 5' 5 min        Sub Talor Mobs  5 RH 5' 5 min                     Exercise Diary             Pulleys  3 min 4 min  4' 4 min        Ankle Pumps HEP            Gastroc ST with Strp L 30" x4 30" x 4 30"x4 supine  :30/4 30" x 4 wedge        Great Toe ST L 30" x 4 HEP            Table Slide Flex + abd 6" x 10 6" x 10 10x10" :10/10 HEP        Scap Add 10" x 10 10" x 10 10x10"  :10/10 10" x 10        Scaption trial  1 x 10 2x10  2x10 2 x 10        MWM IR ST    :10/10 10" x 10        HR/TR   10x ea 10x 2 x 10        Towel Scrunch  4 x 10 4x10  4x10 3 min        tband row + ext      RTB       Tband ER + IR      RTB       SLS     20" x 4                                                                                                                Modalities             HP to Shoulder/Foot prn   declined   10 min HP L foot R shoulder

## 2019-07-30 ENCOUNTER — APPOINTMENT (OUTPATIENT)
Dept: PHYSICAL THERAPY | Facility: CLINIC | Age: 74
End: 2019-07-30
Payer: COMMERCIAL

## 2019-08-01 ENCOUNTER — EVALUATION (OUTPATIENT)
Dept: PHYSICAL THERAPY | Facility: CLINIC | Age: 74
End: 2019-08-01
Payer: COMMERCIAL

## 2019-08-01 ENCOUNTER — TRANSCRIBE ORDERS (OUTPATIENT)
Dept: PHYSICAL THERAPY | Facility: CLINIC | Age: 74
End: 2019-08-01

## 2019-08-01 DIAGNOSIS — G89.29 CHRONIC RIGHT SHOULDER PAIN: ICD-10-CM

## 2019-08-01 DIAGNOSIS — M72.2 PLANTAR FASCIITIS: Primary | ICD-10-CM

## 2019-08-01 DIAGNOSIS — M72.2 PLANTAR FASCIAL FIBROMATOSIS: Primary | ICD-10-CM

## 2019-08-01 DIAGNOSIS — M25.511 CHRONIC RIGHT SHOULDER PAIN: ICD-10-CM

## 2019-08-01 PROCEDURE — 97110 THERAPEUTIC EXERCISES: CPT | Performed by: PHYSICAL THERAPIST

## 2019-08-01 PROCEDURE — 97140 MANUAL THERAPY 1/> REGIONS: CPT | Performed by: PHYSICAL THERAPIST

## 2019-08-01 NOTE — PROGRESS NOTES
PT Evaluation     Today's date: 2019  Patient name: Amanda Clark  : 1945  MRN: [id number]  Referring provider: Megha Garcia MD  Dx:   Encounter Diagnosis     ICD-10-CM    1  Plantar fasciitis M72 2    2  Chronic right shoulder pain M25 511     G89 29                   Assessment  Assessment details: Pt is progressing very well at this time  They have demonstrated improvement in pain, strength, range, flexibility and tolerance to activity  They have achieved all STGs sought out for them as well as by them and all LTG's for foot and that is likely safe to DC to HEP  They will benefit continued Skilled PT intervention for their shoulder in order to achieve all LTGs sought out for them as well as by them in order to perform all desired activities with minimal to nil symptom exacerbation    Thank you very much for this kind and motivated referral         Impairments: abnormal gait, abnormal or restricted ROM, activity intolerance, impaired balance and pain with function  Understanding of Dx/Px/POC: good   Prognosis: good    Goals  STG 4 Weeks:  Decrease pain at worst to 5/10 -met  Improve range to by 10 shoulder all planes from IE R, DF neutral and Great toe to 45  -met  Improve strength to HR x 5, shoulder 3+-4-  -met  Independent with HEP -met  LTG 8 Weeks:  Decrease pain at worst to 2/10  Improve range to within 5 of contralateral shoulder, DF to 5, great toe to 60  Improve strength to HR x 10, shoulder 4  Able to perform all desired activities with minimal to nil symptom exacerbation      Plan  Patient would benefit from: skilled physical therapy  Planned modality interventions: cryotherapy and thermotherapy: hydrocollator packs  Planned therapy interventions: manual therapy, joint mobilization, patient education, postural training, stretching, strengthening, therapeutic activities, therapeutic exercise, therapeutic training, home exercise program, graded exercise, graded activity, gait training, functional ROM exercises and flexibility  Frequency: 2x week  Duration in weeks: 8  Treatment plan discussed with: patient        Subjective Evaluation    History of Present Illness  Date of onset: 2019  Mechanism of injury: Pt presents today stating that his foot since injection is about 80% better  He reports that he no longer needs to continue to perform formal PT for his foot and is very content with the progression and will continue HEP per his podiatrist   Pt reports pain at worst over the last has been 6/10 with increased activity, reaching behind his back, but overall everything has gotten better such as bathing, don/doff clothing, as well as sleeping  Pt reports biggest goals are to be able to continue working on strength and reaching behind his back  Quality of life: good    Pain  Current pain ratin  At best pain ratin  At worst pain ratin  Quality: dull ache, sharp and radiating  Relieving factors: medications  Aggravating factors: sitting, standing, walking and overhead activity  Progression: worsening      Diagnostic Tests  X-ray: abnormal  Treatments  No previous or current treatments  Patient Goals  Patient goals for therapy: increased strength, return to sport/leisure activities, increased motion and decreased pain          Objective     Active Range of Motion   Left Shoulder   Flexion: 150 degrees WFL  Abduction: 140 degrees WFL  External rotation BTH: C7 WFL  Internal rotation BTB: T10 WFL    Right Shoulder   Flexion: 135 (improve PROM with AP mob 140) degrees with pain  Abduction: 120 (110* PROM) degrees with pain  External rotation BTH: C7 (PROM 60)   Internal rotation BTB: L5 (PROM 70)     Additional Active Range of Motion Details  Forward head, rounded shoulders  B/L Sensation intact to light touch C3,4,5,6,7,8,T1,T2  R Flex 4- Abd 3+ ER 4 IR 4    L Flex 5 abd 5 ER 5 IR  UE Screen +  strong and painless  CS Screen  WFL and without symptom exacerbation     Palpation pain along front of shoulder    STs  + HK, (-) Speeds  (-) Biceps Load, RC appeared intact with isometric assessment, no formal testing done  Passive Range of Motion   Left Ankle/Foot    Dorsiflexion (ke): 5 degrees   Plantar flexion: 60 degrees   Inversion: 35 degrees   Eversion: 25 degrees   Great toe extension: 65 degrees     Right Ankle/Foot    Dorsiflexion (ke): -5 degrees   Plantar flexion: 65 degrees   Inversion: 35 degrees   Eversion: 20 degrees   Great toe extension: 35 degrees     Additional Passive Range of Motion Details  Sensation intact to light touch L3,4,5,S1,S2  Genu valgus, pes planus L > R, R ER > L  Antalgia with L IC  SLS L 10" R 10" min sway  SLS HR L 10 R 10  Palpation: pain along medial calcaneal tubercle  Joint Mobility:   L Sub talar 1 Navicular 4 Cuboid 2   R Sub talar 1 Navicular 4 Cuboid 2  STs: (-) Liz Rivera Fx, (-) Anterior Drawer  Precautions: Hx NSTEMI Stent, HTN, Hx of Concussion, Hx of Skin CA, Pagents Diseased     Daily Treatment Diary       Manual 6/28 7/1 07/09 7/16 7/25 8/1       PROM R shoulder  (gentle)   5 min 5'  5' 5 min 5       AP/Inf Mobs Grade 2-4  t/o RH 5' 5min 5        DF and Great toe ST L  5 5' 5' 5 min        Sub Talor Mobs  5 RH 5' 5 min                     Exercise Diary             Pulleys  3 min 4 min  4' 4 min 4 min       Ankle Pumps HEP            Gastroc ST with Strp L 30" x4 30" x 4 30"x4 supine  :30/4 30" x 4 wedge DC       Great Toe ST L 30" x 4 HEP            Table Slide Flex + abd 6" x 10 6" x 10 10x10" :10/10 HEP        Scap Add 10" x 10 10" x 10 10x10"  :10/10 10" x 10 10" x 10       Scaption trial  1 x 10 2x10  2x10 2 x 10 2 x 10       MWM IR ST    :10/10 10" x 10 10" x 10       HR/TR   10x ea 10x 2 x 10 DC       Towel Scrunch  4 x 10 4x10  4x10 3 min DC       tband row + ext      RTB 2 x 10       Tband ER + IR      RTB 2 x 10       SLS     20" x 4 DC Modalities             HP to Shoulder/Foot prn   declined   10 min HP L foot R shoulder    decline

## 2019-08-01 NOTE — LETTER
2019    Charanjit Romeo MD   [facility]  [address]  [facility] [address]    Patient: Suzanna Carrington   YOB: 1945   Date of Visit: 2019     Encounter Diagnosis     ICD-10-CM    1  Plantar fasciitis M72 2    2  Chronic right shoulder pain M25 511     G89 29        Dear Dr Luisa Tinajero: Thank you for your recent referral of Suzanna Carrington  Please review the attached evaluation summary from Jose F's recent visit  Please verify that you agree with the plan of care by signing the attached order  If you have any questions or concerns, please do not hesitate to call  I sincerely appreciate the opportunity to share in the care of one of your patients and hope to have another opportunity to work with you in the near future  Sincerely,    Amara Jarrell, PT, DPT, OCS      Referring Provider:      I certify that I have read the below Plan of Care and certify the need for these services furnished under this plan of treatment while under my care  Charanjit Romeo MD   [facility]  [address]  [address] [place] [id number]  VIA Facsimile: [phone]          PT Evaluation     Today's date: 2019  Patient name: Suzanna Carrington  : 1945  MRN: [id number]  Referring provider: Aman Liz MD  Dx:   Encounter Diagnosis     ICD-10-CM    1  Plantar fasciitis M72 2    2  Chronic right shoulder pain M25 511     G89 29                   Assessment  Assessment details: Pt is progressing very well at this time  They have demonstrated improvement in pain, strength, range, flexibility and tolerance to activity  They have achieved all STGs sought out for them as well as by them and all LTG's for foot and that is likely safe to DC to HEP  They will benefit continued Skilled PT intervention for their shoulder in order to achieve all LTGs sought out for them as well as by them in order to perform all desired activities with minimal to nil symptom exacerbation    Thank you very much for this kind and motivated referral         Impairments: abnormal gait, abnormal or restricted ROM, activity intolerance, impaired balance and pain with function  Understanding of Dx/Px/POC: good   Prognosis: good    Goals  STG 4 Weeks:  Decrease pain at worst to 5/10 -met  Improve range to by 10 shoulder all planes from IE R, DF neutral and Great toe to 45  -met  Improve strength to HR x 5, shoulder 3+-4-  -met  Independent with HEP -met  LTG 8 Weeks:  Decrease pain at worst to 2/10  Improve range to within 5 of contralateral shoulder, DF to 5, great toe to 60  Improve strength to HR x 10, shoulder 4  Able to perform all desired activities with minimal to nil symptom exacerbation      Plan  Patient would benefit from: skilled physical therapy  Planned modality interventions: cryotherapy and thermotherapy: hydrocollator packs  Planned therapy interventions: manual therapy, joint mobilization, patient education, postural training, stretching, strengthening, therapeutic activities, therapeutic exercise, therapeutic training, home exercise program, graded exercise, graded activity, gait training, functional ROM exercises and flexibility  Frequency: 2x week  Duration in weeks: 8  Treatment plan discussed with: patient        Subjective Evaluation    History of Present Illness  Date of onset: 6/28/2019  Mechanism of injury: Pt presents today stating that his foot since injection is about 80% better  He reports that he no longer needs to continue to perform formal PT for his foot and is very content with the progression and will continue HEP per his podiatrist   Pt reports pain at worst over the last has been 6/10 with increased activity, reaching behind his back, but overall everything has gotten better such as bathing, don/doff clothing, as well as sleeping  Pt reports biggest goals are to be able to continue working on strength and reaching behind his back     Quality of life: good    Pain  Current pain ratin  At best pain ratin  At worst pain ratin  Quality: dull ache, sharp and radiating  Relieving factors: medications  Aggravating factors: sitting, standing, walking and overhead activity  Progression: worsening      Diagnostic Tests  X-ray: abnormal  Treatments  No previous or current treatments  Patient Goals  Patient goals for therapy: increased strength, return to sport/leisure activities, increased motion and decreased pain          Objective     Active Range of Motion   Left Shoulder   Flexion: 150 degrees WFL  Abduction: 140 degrees WFL  External rotation BTH: C7 WFL  Internal rotation BTB: T10 WFL    Right Shoulder   Flexion: 135 (improve PROM with AP mob 140) degrees with pain  Abduction: 120 (110* PROM) degrees with pain  External rotation BTH: C7 (PROM 60)   Internal rotation BTB: L5 (PROM 70)     Additional Active Range of Motion Details  Forward head, rounded shoulders  B/L Sensation intact to light touch C3,4,5,6,7,8,T1,T2  R Flex 4- Abd 3+ ER 4 IR 4    L Flex 5 abd 5 ER 5 IR  UE Screen +  strong and painless  CS Screen  WFL and without symptom exacerbation  Palpation pain along front of shoulder    STs  + HK, (-) Speeds  (-) Biceps Load, RC appeared intact with isometric assessment, no formal testing done  Passive Range of Motion   Left Ankle/Foot    Dorsiflexion (ke): 5 degrees   Plantar flexion: 60 degrees   Inversion: 35 degrees   Eversion: 25 degrees   Great toe extension: 65 degrees     Right Ankle/Foot    Dorsiflexion (ke): -5 degrees   Plantar flexion: 65 degrees   Inversion: 35 degrees   Eversion: 20 degrees   Great toe extension: 35 degrees     Additional Passive Range of Motion Details  Sensation intact to light touch L3,4,5,S1,S2  Genu valgus, pes planus L > R, R ER > L  Antalgia with L IC  SLS L 10" R 10" min sway  SLS HR L 10 R 10  Palpation: pain along medial calcaneal tubercle     Joint Mobility:   L Sub talar 1 Navicular 4 Cuboid 2   R Sub talar 1 Navicular 4 Cuboid 2  STs: (-) Liz Rivera Fx, (-) Anterior Drawer  Precautions: Hx NSTEMI Stent, HTN, Hx of Concussion, Hx of Skin CA, Pagents Diseased  Daily Treatment Diary       Manual 6/28 7/1 07/09 7/16 7/25 8/1       PROM R shoulder  (gentle)   5 min 5'  5' 5 min 5       AP/Inf Mobs Grade 2-4  t/o RH 5' 5min 5        DF and Great toe ST L  5 5' 5' 5 min        Sub Talor Mobs  5 RH 5' 5 min                     Exercise Diary             Pulleys  3 min 4 min  4' 4 min 4 min       Ankle Pumps HEP            Gastroc ST with Strp L 30" x4 30" x 4 30"x4 supine  :30/4 30" x 4 wedge DC       Great Toe ST L 30" x 4 HEP            Table Slide Flex + abd 6" x 10 6" x 10 10x10" :10/10 HEP        Scap Add 10" x 10 10" x 10 10x10"  :10/10 10" x 10 10" x 10       Scaption trial  1 x 10 2x10  2x10 2 x 10 2 x 10       MWM IR ST    :10/10 10" x 10 10" x 10       HR/TR   10x ea 10x 2 x 10 DC       Towel Scrunch  4 x 10 4x10  4x10 3 min DC       tband row + ext      RTB 2 x 10       Tband ER + IR      RTB 2 x 10       SLS     20" x 4 DC                                                                                                               Modalities             HP to Shoulder/Foot prn   declined   10 min HP L foot R shoulder    decline

## 2019-08-08 ENCOUNTER — OFFICE VISIT (OUTPATIENT)
Dept: PHYSICAL THERAPY | Facility: CLINIC | Age: 74
End: 2019-08-08
Payer: COMMERCIAL

## 2019-08-08 DIAGNOSIS — M25.511 CHRONIC RIGHT SHOULDER PAIN: Primary | ICD-10-CM

## 2019-08-08 DIAGNOSIS — G89.29 CHRONIC RIGHT SHOULDER PAIN: Primary | ICD-10-CM

## 2019-08-08 PROCEDURE — 97110 THERAPEUTIC EXERCISES: CPT | Performed by: PHYSICAL THERAPIST

## 2019-08-08 PROCEDURE — 97140 MANUAL THERAPY 1/> REGIONS: CPT | Performed by: PHYSICAL THERAPIST

## 2019-08-08 NOTE — PROGRESS NOTES
Daily Note     Today's date: 2019  Patient name: Amanda Clark  : 1945  MRN: [id number]  Referring provider: Megha Garcia MD  Dx:   Encounter Diagnosis     ICD-10-CM    1  Chronic right shoulder pain M25 511     G89 29                   Subjective: Pt reports shoulder is feeling well  Offers no pain today  Objective: See treatment diary below  Assessment: Enhanced resistance with band work today without symptom exacerbation  S of PT TAS til 605 rest with PT RH  Plan: Continue per plan of care  Precautions: Hx NSTEMI Stent, HTN, Hx of Concussion, Hx of Skin CA, Pagents Diseased  Daily Treatment Diary       Manual       PROM R shoulder  (gentle)   5 min 5'  5' 5 min 5 5      AP/Inf Mobs Grade 2-4  t/o RH 5' 5min 5  5      DF and Great toe ST L  5 5' 5' 5 min        Sub Talor Mobs  5 RH 5' 5 min                     Exercise Diary             Pulleys  3 min 4 min  4' 4 min 4 min 4 mins      Ankle Pumps HEP            Gastroc ST with Strp L 30" x4 30" x 4 30"x4 supine  :30/4 30" x 4 wedge DC       Great Toe ST L 30" x 4 HEP            Table Slide Flex + abd 6" x 10 6" x 10 10x10" :10/10 HEP        Scap Add 10" x 10 10" x 10 10x10"  :10/10 10" x 10 10" x 10 10" x 10      Scaption trial  1 x 10 2x10  2x10 2 x 10 2 x 10 2 x 10      MWM IR ST    :10/10 10" x 10 10" x 10 10" x 10       HR/TR   10x ea 10x 2 x 10 DC       Towel Scrunch  4 x 10 4x10  4x10 3 min DC       tband row + ext      RTB 2 x 10 GTB 2x 10      Tband ER + IR      RTB 2 x 10 RTB      SLS     20" x 4 DC                                                                                                               Modalities             HP to Shoulder/Foot prn   declined   10 min HP L foot R shoulder    decline dec

## 2019-08-15 ENCOUNTER — APPOINTMENT (OUTPATIENT)
Dept: PHYSICAL THERAPY | Facility: CLINIC | Age: 74
End: 2019-08-15
Payer: COMMERCIAL

## 2019-08-20 DIAGNOSIS — F43.23 ACUTE ADJUSTMENT DISORDER WITH MIXED ANXIETY AND DEPRESSED MOOD: ICD-10-CM

## 2019-08-20 DIAGNOSIS — F43.10 PTSD (POST-TRAUMATIC STRESS DISORDER): ICD-10-CM

## 2019-08-20 RX ORDER — CLONIDINE HYDROCHLORIDE 0.1 MG/1
0.1 TABLET ORAL
Qty: 90 TABLET | Refills: 0 | Status: SHIPPED | OUTPATIENT
Start: 2019-08-20 | End: 2019-11-11 | Stop reason: SDUPTHER

## 2019-08-22 ENCOUNTER — APPOINTMENT (OUTPATIENT)
Dept: PHYSICAL THERAPY | Facility: CLINIC | Age: 74
End: 2019-08-22
Payer: COMMERCIAL

## 2019-09-04 NOTE — PROGRESS NOTES
PT Discharge    Today's date: 2019  Patient name: Juan A Fitch  : 1945  MRN: [id number]  Referring provider: Don Boss MD  Dx:   Encounter Diagnosis     ICD-10-CM    1  Chronic right shoulder pain M25 511     G89 29        Start Time: 1740  Stop Time: 181  Total time in clinic (min): 35 minutes    Assessment/Plan  Pt has not been present since 19  Pt's chart will be DC in compliance of facility policy as all Charts are DC within 30 days of last scheduled visit          Subjective    Objective    Flowsheet Rows      Most Recent Value   PT/OT G-Codes   Current Score  83   Projected Score  65

## 2019-10-04 ENCOUNTER — TELEPHONE (OUTPATIENT)
Dept: CARDIOLOGY CLINIC | Facility: CLINIC | Age: 74
End: 2019-10-04

## 2019-10-04 NOTE — TELEPHONE ENCOUNTER
Called patient in regard [facility] request form the [facility] (Tre Willard RN), patient mention is going to have Stress test done today and will call back depend of the results

## 2019-10-15 NOTE — TELEPHONE ENCOUNTER
Called patient and LVM in regard cath to be schedule, follow up to see if patient still need to schedule the procedure depend on the stress test results

## 2019-11-08 ENCOUNTER — OFFICE VISIT (OUTPATIENT)
Dept: PSYCHIATRY | Facility: CLINIC | Age: 74
End: 2019-11-08
Payer: COMMERCIAL

## 2019-11-08 ENCOUNTER — TELEPHONE (OUTPATIENT)
Dept: NEUROLOGY | Facility: CLINIC | Age: 74
End: 2019-11-08

## 2019-11-08 DIAGNOSIS — F07.81 POSTCONCUSSION SYNDROME: ICD-10-CM

## 2019-11-08 DIAGNOSIS — R41.3 MEMORY DISTURBANCE: Primary | ICD-10-CM

## 2019-11-08 DIAGNOSIS — F43.23 ACUTE ADJUSTMENT DISORDER WITH MIXED ANXIETY AND DEPRESSED MOOD: ICD-10-CM

## 2019-11-08 PROCEDURE — 99214 OFFICE O/P EST MOD 30 MIN: CPT | Performed by: PSYCHIATRY & NEUROLOGY

## 2019-11-08 RX ORDER — LORAZEPAM 0.5 MG/1
.5-1 TABLET ORAL DAILY PRN
Qty: 10 TABLET | Refills: 1 | Status: SHIPPED | OUTPATIENT
Start: 2019-11-08 | End: 2021-09-29
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
## 2019-11-08 NOTE — PROGRESS NOTES
[facility] [facility]  PATIENT:  Kennedy Lara  MRN:  [id number]  :  1945  DATE OF SERVICE:  2019      Assessment/Plan:        Problem List Items Addressed This Visit        Respiratory    CHANTEL (obstructive sleep apnea)    Relevant Orders    Ambulatory referral to Sleep Medicine       Other    REM behavioral disorder    Relevant Medications    QUEtiapine (SEROquel) 25 mg tablet    Other Relevant Orders    Ambulatory referral to Sleep Medicine    Cognitive complaints with normal exam - Primary    Relevant Orders    Vitamin B12    TSH, 3rd generation with Free T4 reflex    Ambulatory referral to Neuropsychology      Other Visit Diagnoses     Vitamin D deficiency        Relevant Orders    Vitamin D 25 hydroxy         Cognitive concerns:  (Suspect multifactorial given history of depression/anxiety/sleep apnea with REM sleep behavior disorder)  Work up:   - lab:  B12, vitamin-D, TSH  - Continue Aspirin 81mg per day  Cerebral vascular risk factors should be managed by the expertise of the patients primary care physician  - We will offer neuropsychology testing as option to pursue with this patient  This will be gives a more indepth idea of areas of cognition most affected and a baseline cognitive function to compare to in the future  - The patient was encouraged to be physically and mentally active  To partake in reading, cross word puzzles, cognitive reinforcement games  - Reviewed with patient in length regarding exercising at least three times a week and cognitive stimulation  As some research suggested that it may slow the progression of the cognitive decline and shows improvement in some cognitive measures  Obstructive sleep apnea/REM sleep behavior disorder:  Refer patient to Sleep Medicine for evaluation and treatment  Importance of Healthy Sleep:  Behavioral sleep changes can promote restful, regular sleep   Simple changes like establishing consistent sleep and wake-up times, as well as getting between 7 and 8 hours of sleep a day, can make a world of difference  Experts also recommend avoiding substances that impair sleep, like caffeine, nicotine and alcohol, and also suggest winding down before bed to prevent sleep problems  Exercising:   When one exercises, the body releases endorphins, which are the bodys natural painkillers  Exercise reduces stress and helps individuals to sleep at night  Exercising at least 30 to 40 minutes 3 times a week is sufficient for most patients  When exercising, follow this plan :  - First, stay hydrated before, during, and after exercise  - Second part of the exercise plan is to eat sufficient food about an hour and a half before you exercise  Exercise causes ones blood sugar level to decrease, and it is important to have a source of energy    - Final part of the exercise plan is to warm-up  Do not jump into sudden, vigorous exercise  Neck pain:   Continue physical therapy on your own and also follow the instructions below for neck exercises  - Neck pathology and poor posture, with straightening of the normal cervical lordosis, can cause headaches  Tightening of the neck muscles can irritate the nerves in the occipital region of the head and cause or worsen head pain  Thus neck strengthening and relaxation exercises, can help improve this particular pain  It is importance to have good posture for improving shoulder, neck, and head pain  - Here are some exercises which should take 5 minutes:     1  Standing, drop your head to one side while continuing to look ahead  Hold for 10 seconds then swap sides  Repeat twice more each side  To increase the stretch, drop the opposite shoulder  2  Standing again, lower your chin to your chest, hold for 10 and then look up to the ceiling and hold for 10  Repeat twice more       3  Next, standing straight again, look over your right shoulder and hold firm for 10 seconds, then over your left shoulder for 10  Repeat this 3 times  4  Finally, while sitting upright, bring your head forward and hold for 10, then all the way back and hold for 10  If this simple exercise does not help improve the posture, we will consider formal physical therapy in the future  Please call with any questions or concerns  Office number is [phone]          History of Present Illness: We had the pleasure of evaluating Robert Gonzalez in neurological follow up today  As you know he is a 69year old left handed male  He is a  and is here today for evaluation of his memory concerns          Current medical illnesses:  - QTC - 7/2/2018 - 5  - Last date of colonoscopy? About 5 years ago  History Tobacco use: none  MI about a year ago  CHANTEL and has REM sleep   Hypertension  Major depression/anxiety - he is seeing psychiatry and that has been helpful  Pt states he is tired and is not able to sleep  Cervicalgia:  Patient has had physical therapy done and states that he continues to notice neck and back of the head pain which is mild but there  MRI C spine:  2016  IMPRESSION: No evidence for osseous or ligamentous injury within the cervical spine  No evidence of cord deformity/compression or cord signal abnormal artery  Moderate to severe left-sided neural foraminal stenosis at C3-C4  Otherwise, mild to moderate degrees of stenosis as detailed level by level  Heterogeneous and partially cystic lesion within the superficial lobe of the right parotid gland measuring 1 1 x 0 7 cm  Although nonspecific, lesions within the parotid reflect pleomorphic adenoma/benign mixed tumors  However, consider correlation with tissue sampling or MRI as clinically warranted        Memory concerns:    States that over the last 3 years he has noticed more memory problems  States that at work he seems to be very organized, writes everything down and has a scheduled that he follows    Memory does not seem to affect his work     States that memory is more of a concern in his personal life and it is affecting him significantly  He is forgetting events and conversation with his family and friends  And when they bring it up he has no recollection of the conversation he had with them and thus he feels embarrassed and upset  His family friend also feel frustrated with him in regards to this as well       Mood:  - He is taking Prozac 40 mg once a day  Feels that mood and anxiety is controlled to some degree       Insomnia:   -  Has trouble falling a sleep and maintaining a sleep    - he is getting 4 to 7 hours now  Patient also states that he has noted that he is having more dreams and is acting out his dreams  He has never had a sleep study done         Photosensitivity and phonophobia:   - Improved but can occur occasionally    Have you ever had any Brain imaging? yes    I personally reviewed these images  Recent laboratory data was reviewed  Medications and allergies were reviewed  04/14/2017-MRI brain:  - IMPRESSION:  No significant intracranial abnormalities, consistent with prior MRI study  Incompletely visualized right parotid nodule measuring approximately 11 mm which appears to correlate with ultrasound-guided biopsy    Further assessment via IV contrast-enhanced MRI or CT may be useful if clinically appropriate       Past Medical History:   Diagnosis Date    Cancer ([facility] [address] )     Skin    Concussion     Heart attack ([facility] [address] )     Hypertension        Patient Active Problem List   Diagnosis    Cervical radiculopathy    MDD (major depressive disorder), single episode, mild (HCC)    PTSD (post-traumatic stress disorder)    NSTEMI type 1     Accelerated hypertension on admission - history of Essential hypertension    Adjustment disorder with anxiety    Numbness and tingling in left hand    Insomnia    CHANTEL (obstructive sleep apnea)    Hypersomnia    REM behavioral disorder    Cognitive complaints with normal exam Medications:      Current Outpatient Medications   Medication Sig Dispense Refill    aspirin 81 MG tablet Take 81 mg by mouth daily       atorvastatin (LIPITOR) 40 mg tablet Take 1 tablet (40 mg total) by mouth daily with dinner 30 tablet 0    cloNIDine (CATAPRES) 0 1 mg tablet TAKE 1 TABLET (0 1 MG TOTAL) BY MOUTH DAILY AT BEDTIME 90 tablet 0    FLUoxetine (PROzac) 40 MG capsule Take 1 capsule (40 mg total) by mouth daily 30 capsule 3    LORazepam (ATIVAN) 0 5 mg tablet Take 1-2 tablets (0 5-1 mg total) by mouth daily as needed (anticiapted high anxiety event, or when high anxiety occurs  ) 10 tablet 1    losartan (COZAAR) 50 mg tablet Take 50 mg by mouth daily      metoprolol tartrate (LOPRESSOR) 25 mg tablet Take 1 tablet (25 mg total) by mouth every 12 (twelve) hours 60 tablet 0    nitroglycerin (NITROSTAT) 0 4 mg SL tablet Place 1 tablet (0 4 mg total) under the tongue every 5 (five) minutes as needed for chest pain (Patient not taking: Reported on 11/11/2019) 90 tablet 0    QUEtiapine (SEROquel) 25 mg tablet Take 1 tablet (25 mg total) by mouth daily at bedtime 30 tablet 1     No current facility-administered medications for this visit  Allergies:       Allergies   Allergen Reactions    Codeine     Hydrocodone Nausea Only    Other      Annotation - [id number]: anesthesia - took a long time to wake up and was very nauseated and with vomiting    Oxycodone GI Intolerance, Nausea Only and Abdominal Pain    Prednisone Other (See Comments)     Other reaction(s): Depression, mood swings, anger  psychosis       Family History:     Family History   Problem Relation Age of Onset    Congenital heart disease Mother    Simon Mahoney Breast cancer Mother     Stomach cancer Father     Heart attack Paternal Grandmother        Social History:     Social History     Socioeconomic History    Marital status: /Civil Union     Spouse name: Not on file    Number of children: Not on file    Years of education: Not on file    Highest education level: Not on file   Occupational History    Not on file   Social Needs    Financial resource strain: Not on file    Food insecurity:     Worry: Not on file     Inability: Not on file    Transportation needs:     Medical: Not on file     Non-medical: Not on file   Tobacco Use    Smoking status: Never Smoker    Smokeless tobacco: Never Used   Substance and Sexual Activity    Alcohol use: Yes     Comment: 1 bottle of wine a week    Drug use: No    Sexual activity: Yes     Partners: Female   Lifestyle    Physical activity:     Days per week: Not on file     Minutes per session: Not on file    Stress: Not on file   Relationships    Social connections:     Talks on phone: Not on file     Gets together: Not on file     Attends Sabianism service: Not on file     Active member of club or organization: Not on file     Attends meetings of clubs or organizations: Not on file     Relationship status: Not on file    Intimate partner violence:     Fear of current or ex partner: Not on file     Emotionally abused: Not on file     Physically abused: Not on file     Forced sexual activity: Not on file   Other Topics Concern    Not on file   Social History Narrative    Not on file         Objective:   Physical Exam:                                                                   Vitals:            /94 (BP Location: Left arm, Patient Position: Sitting, Cuff Size: Standard)   Pulse 57   Ht 5' 10" (1 778 m)   Wt 93 9 kg (207 lb)   BMI 29 70 kg/m²   BP Readings from Last 3 Encounters:   11/11/19 146/94   04/10/19 169/87   10/08/18 128/76     Pulse Readings from Last 3 Encounters:   11/11/19 57   04/10/19 55   10/08/18 64              CONSTITUTIONAL: Well developed, well nourished, well groomed  No dysmorphic features  Eyes:  PERRLA, EOM normal      Neck:  Normal ROM, neck supple  HEENT:  Normocephalic atraumatic  No meningismus      Oropharynx is clear and moist  No oral mucosal lesions  Chest:  Respirations regular and unlabored  Cardiovascular:  Distal extremities warm without palpable edema or tenderness, no observed significant swelling  Musculoskeletal:  Full range of motion  Skin:  warm and dry   Psychiatric:  Normal behavior and appropriate affect        Neurological Examination:   Mental status/cognitive function: Orientated to time, place and person  Hodgeman:  11/11/2019- 26/30    Cranial Nerves: 2 to 12 intact  Motor Exam:    5/5 upper extremity  5/5 lower extremity  Sensory: grossly intact light touch in all extremities  Reflexes:   2/4 upper extremity  2/4 lower extremity  No clonus noted  Coordination: Finger to nose intact bilaterally, no tremor noted  Gait: steady casual  gait, able to do tandem gait, romberg negative  Review of Systems:   Review of Systems   Constitutional: Positive for fatigue  HENT: Positive for tinnitus  Phonophobia    Eyes: Positive for photophobia  Respiratory: Negative  Cardiovascular: Positive for chest pain  Gastrointestinal: Negative  Endocrine: Negative  Genitourinary: Negative  Musculoskeletal: Positive for neck pain and neck stiffness  Joint Pain    Skin: Negative  Allergic/Immunologic: Negative  Neurological: Positive for dizziness (Occasionally ), light-headedness (Occasionally ) and headaches  Memory Problems      Hematological: Bruises/bleeds easily  Psychiatric/Behavioral: Positive for decreased concentration, self-injury (Trouble staying asleep ) and sleep disturbance  The patient is nervous/anxious          I personally reviewed and updated the ROS that was entered by the medical assistant       I have spent 30 minutes with Patient  today in which greater than 50% of this time was spent in counseling/coordination of care regarding Diagnostic results, Prognosis, Risks and benefits of tx options, Intructions for management, Patient and family education, Importance of tx compliance, Risk factor reductions, Impressions and Plan of care as above        Author:  Radha Leavitt MD 11/11/2019 9:25 AM

## 2019-11-11 ENCOUNTER — OFFICE VISIT (OUTPATIENT)
Dept: NEUROLOGY | Facility: CLINIC | Age: 74
End: 2019-11-11
Payer: COMMERCIAL

## 2019-11-11 VITALS
WEIGHT: 207 LBS | HEART RATE: 57 BPM | HEIGHT: 70 IN | BODY MASS INDEX: 29.63 KG/M2 | SYSTOLIC BLOOD PRESSURE: 146 MMHG | DIASTOLIC BLOOD PRESSURE: 94 MMHG

## 2019-11-11 DIAGNOSIS — F43.10 PTSD (POST-TRAUMATIC STRESS DISORDER): ICD-10-CM

## 2019-11-11 DIAGNOSIS — E55.9 VITAMIN D DEFICIENCY: ICD-10-CM

## 2019-11-11 DIAGNOSIS — F43.23 ACUTE ADJUSTMENT DISORDER WITH MIXED ANXIETY AND DEPRESSED MOOD: ICD-10-CM

## 2019-11-11 DIAGNOSIS — R41.9 COGNITIVE COMPLAINTS WITH NORMAL EXAM: Primary | ICD-10-CM

## 2019-11-11 DIAGNOSIS — G47.33 OSA (OBSTRUCTIVE SLEEP APNEA): ICD-10-CM

## 2019-11-11 DIAGNOSIS — G47.52 REM BEHAVIORAL DISORDER: ICD-10-CM

## 2019-11-11 PROBLEM — F45.8 BRUXISM: Status: RESOLVED | Noted: 2018-10-08 | Resolved: 2019-11-11

## 2019-11-11 PROCEDURE — 99215 OFFICE O/P EST HI 40 MIN: CPT | Performed by: PSYCHIATRY & NEUROLOGY

## 2019-11-11 RX ORDER — CLONIDINE HYDROCHLORIDE 0.1 MG/1
0.1 TABLET ORAL
Qty: 90 TABLET | Refills: 0 | Status: SHIPPED | OUTPATIENT
Start: 2019-11-11 | End: 2021-09-29

## 2019-11-11 RX ORDER — QUETIAPINE FUMARATE 25 MG/1
25 TABLET, FILM COATED ORAL
Qty: 30 TABLET | Refills: 1 | Status: SHIPPED | OUTPATIENT
Start: 2019-11-11 | End: 2019-12-07 | Stop reason: SDUPTHER

## 2019-11-11 NOTE — PATIENT INSTRUCTIONS
Cognitive concerns:  (Suspect multifactorial given history of depression/anxiety/sleep apnea with REM sleep behavior disorder)  Work up:   - lab:  B12, vitamin-D, TSH  - Continue Aspirin 81mg per day  Cerebral vascular risk factors should be managed by the expertise of the patients primary care physician  -  We will offer neuropsychology testing as option to pursue with this patient  This will be gives a more indepth idea of areas of cognition most affected and a baseline cognitive function to compare to in the future  - The patient was encouraged to be physically and mentally active  To partake in reading, cross word puzzles, cognitive reinforcement games  - Reviewed with patient in length regarding exercising at least three times a week and cognitive stimulation  As some research suggested that it may slow the progression of the cognitive decline and shows improvement in some cognitive measures  Obstructive sleep apnea/REM sleep behavior disorder:  Refer patient to Sleep Medicine for evaluation and treatment  Importance of Healthy Sleep:  Behavioral sleep changes can promote restful, regular sleep  Simple changes like establishing consistent sleep and wake-up times, as well as getting between 7 and 8 hours of sleep a day, can make a world of difference  Experts also recommend avoiding substances that impair sleep, like caffeine, nicotine and alcohol, and also suggest winding down before bed to prevent sleep problems  Exercising:   When one exercises, the body releases endorphins, which are the bodys natural painkillers  Exercise reduces stress and helps individuals to sleep at night  Exercising at least 30 to 40 minutes 3 times a week is sufficient for most patients  When exercising, follow this plan :  - First, stay hydrated before, during, and after exercise  - Second part of the exercise plan is to eat sufficient food about an hour and a half before you exercise   Exercise causes ones blood sugar level to decrease, and it is important to have a source of energy    - Final part of the exercise plan is to warm-up  Do not jump into sudden, vigorous exercise  Neck pain:   Continue physical therapy on your own and also follow the instructions below for neck exercises  - Neck pathology and poor posture, with straightening of the normal cervical lordosis, can cause headaches  Tightening of the neck muscles can irritate the nerves in the occipital region of the head and cause or worsen head pain  Thus neck strengthening and relaxation exercises, can help improve this particular pain  It is importance to have good posture for improving shoulder, neck, and head pain  - Here are some exercises which should take 5 minutes:     1  Standing, drop your head to one side while continuing to look ahead  Hold for 10 seconds then swap sides  Repeat twice more each side  To increase the stretch, drop the opposite shoulder  2  Standing again, lower your chin to your chest, hold for 10 and then look up to the ceiling and hold for 10  Repeat twice more  3  Next, standing straight again, look over your right shoulder and hold firm for 10 seconds, then over your left shoulder for 10  Repeat this 3 times  4  Finally, while sitting upright, bring your head forward and hold for 10, then all the way back and hold for 10  If this simple exercise does not help improve the posture, we will consider formal physical therapy in the future  Please call with any questions or concerns   Office number is [phone]

## 2019-11-12 ENCOUNTER — TELEPHONE (OUTPATIENT)
Dept: PSYCHIATRY | Facility: CLINIC | Age: 74
End: 2019-11-12

## 2019-11-12 NOTE — TELEPHONE ENCOUNTER
Left message with  who will talk to patient's cardiologist Dr Heron Millard re: clonidine swap to prazosin for psychiatric purposes  They will call RN line once reviewed

## 2019-11-13 NOTE — TELEPHONE ENCOUNTER
LVM to patient:    Call us if he wants to transition to prazosin from clonidine  If so:    Stop Clonidine  Next night, start Prazosin 1mg HS  May increase by 1mg every week to help with nightmares/sleep, NTE 3mg HS  Remind him of blood pressure effects caution when standing, turning (discussed thoroughly in session)  Once he responds, I will order med      Thanks,  Jayashree Friedman

## 2019-11-13 NOTE — TELEPHONE ENCOUNTER
Received a call from [facility]  Dr Powell Shown reviewed information and is okay stopping clonidine and starting Prazosin  Can call [phone] with other concerns

## 2019-11-19 ENCOUNTER — TELEPHONE (OUTPATIENT)
Dept: NEUROLOGY | Facility: CLINIC | Age: 74
End: 2019-11-19

## 2019-12-03 NOTE — TELEPHONE ENCOUNTER
lvm for patient to call office to set an appt with Dr Mane Guerrero or Dr Gail Hardin for APR 12/13/19 LVM for pt to call to sched appt with either provider for MAY    12/30/19 LVM for patient to call office to schedule appt

## 2019-12-07 DIAGNOSIS — G47.52 REM BEHAVIORAL DISORDER: ICD-10-CM

## 2019-12-08 RX ORDER — QUETIAPINE FUMARATE 25 MG/1
TABLET, FILM COATED ORAL
Qty: 30 TABLET | Refills: 1 | Status: SHIPPED | OUTPATIENT
Start: 2019-12-08 | End: 2020-01-02

## 2020-01-02 DIAGNOSIS — G47.52 REM BEHAVIORAL DISORDER: ICD-10-CM

## 2020-01-02 RX ORDER — QUETIAPINE FUMARATE 25 MG/1
TABLET, FILM COATED ORAL
Qty: 30 TABLET | Refills: 1 | Status: SHIPPED | OUTPATIENT
Start: 2020-01-02 | End: 2020-01-21

## 2020-01-20 ENCOUNTER — TELEPHONE (OUTPATIENT)
Dept: BEHAVIORAL/MENTAL HEALTH CLINIC | Facility: CLINIC | Age: 75
End: 2020-01-20

## 2020-01-20 NOTE — TELEPHONE ENCOUNTER
Received a Request for Medical Records form with paperwork from law office of [employer]  Called Jose F on 1/3/2019 and left message requesting he come in and sign a Release of Information form to get process moving

## 2020-01-20 NOTE — TELEPHONE ENCOUNTER
Left message on 1/20/20 again asking for Rhonda Balta to come in and sign a Release of Information to start proceed with releasing of his medical records  Office hours were left in the message along with office telephone number

## 2020-01-21 ENCOUNTER — OFFICE VISIT (OUTPATIENT)
Dept: PSYCHIATRY | Facility: CLINIC | Age: 75
End: 2020-01-21
Payer: COMMERCIAL

## 2020-01-21 VITALS — SYSTOLIC BLOOD PRESSURE: 150 MMHG | DIASTOLIC BLOOD PRESSURE: 89 MMHG

## 2020-01-21 DIAGNOSIS — F43.22 ADJUSTMENT DISORDER WITH ANXIETY: ICD-10-CM

## 2020-01-21 DIAGNOSIS — F32.0 MDD (MAJOR DEPRESSIVE DISORDER), SINGLE EPISODE, MILD (HCC): ICD-10-CM

## 2020-01-21 DIAGNOSIS — F43.10 PTSD (POST-TRAUMATIC STRESS DISORDER): Primary | ICD-10-CM

## 2020-01-21 PROCEDURE — 99214 OFFICE O/P EST MOD 30 MIN: CPT | Performed by: PSYCHIATRY & NEUROLOGY

## 2020-01-21 RX ORDER — PRAZOSIN HYDROCHLORIDE 1 MG/1
CAPSULE ORAL
Qty: 90 CAPSULE | Refills: 1 | Status: SHIPPED | OUTPATIENT
Start: 2020-01-21 | End: 2020-02-12

## 2020-01-21 NOTE — BH TREATMENT PLAN
TREATMENT PLAN (Medication Management Only)        [facility]    Name/Date of Birth/MRN:  Felipe Sampson 69 y o  1945 MRN: [id number]  Date of Treatment Plan: January 21, 2020  Diagnosis/Diagnoses:   1  PTSD (post-traumatic stress disorder)    2  MDD (major depressive disorder), single episode, mild ([facility] [address] )    3  Adjustment disorder with anxiety      Strengths/Personal Resources for Self-Care: good support, good job  Area/Areas of need (in own words): my bizarre dreams  1  Long Term Goal: "to get sleep"   Target Date: 180 days from treatment plan  Person/Persons responsible for completion of goal: Dr Kirk Hathaway and Self  2  Short Term Objective (s) - How will we reach this goal?:   A  Provider new recommended medication/dosage changes and/or continue medication(s): as noted  B   Take meds as prescribed, call if issues  C  Follow up with neurology  Target Date: 6 months from treatment plan unless noted otherwise  Person/Persons Responsible for Completion of Goal: Dr Kirk Hathaway and Self   Progress Towards Goals: continuing treatment   Treatment Modality: Medication management and therapy PRN  Review due 180 days from date of this plan: Approximately 6 months from today ( 7/21/2020 )    Expected length of service: ongoing treatment unless revised  My Physician/PA/NP and I have developed this plan together and I agree to work on the goals and objectives  I understand the treatment goals that were developed for my treatment    Signature:       Date and time:  Signature of parent/guardian if under age of 15 years: Date and time:  Signature of provider:      Date and time:  Signature of Supervising Physician:    Date and time: 1/21/2020      Yannick Rush III, DO
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
## 2020-01-24 NOTE — TELEPHONE ENCOUNTER
Patient came in and signed DENVER on 1/21/2020  Faxed medical records request (Raheel Adorno) to [facility] representative for processing

## 2020-01-27 NOTE — TELEPHONE ENCOUNTER
Medical Records processed by Fely Whiteside from [facility] on 1/27/2020

## 2020-01-29 DIAGNOSIS — G47.52 REM BEHAVIORAL DISORDER: ICD-10-CM

## 2020-01-29 RX ORDER — QUETIAPINE FUMARATE 25 MG/1
TABLET, FILM COATED ORAL
Qty: 30 TABLET | Refills: 0 | OUTPATIENT
Start: 2020-01-29

## 2020-02-05 ENCOUNTER — TELEPHONE (OUTPATIENT)
Dept: NEUROLOGY | Facility: CLINIC | Age: 75
End: 2020-02-05

## 2020-02-05 NOTE — TELEPHONE ENCOUNTER
Marbin Ariza called from patient  office to ask for a narrative from Dr Nay Boggs, noted that I see their request scanned into media but if he is looking to open a line of communication between him and Dr Nay Boggs that I would refer him to our nursing team  Transferred Marbin to nurse queue

## 2020-02-05 NOTE — TELEPHONE ENCOUNTER
Lluiva Bauer,  called and requested a narrative report regarding care and treatment and he is asking to discuss this with you before or after you write narrative  there is a request scanned into chart under encounter dated 2/4 for your review  please advise if you are able to do a narrative report and he is asking to speak to you directly    [phone]

## 2020-02-07 NOTE — TELEPHONE ENCOUNTER
Call placed to [facility]  Spoke to   Informed her we will not be writing narrative, but they can request medical records if they wish

## 2020-02-07 NOTE — TELEPHONE ENCOUNTER
Miles Marx from [facility] calling  Advised again that we do not complete narrative reports  She states that she would like the medical records and would like them emailed or faxed today  I advised that the DENVER we have on file would be sent to [facility] and the records would need to come from them  She asked for turnaround time which I stated I did not know  Provided number for [facility]

## 2020-02-10 ENCOUNTER — OFFICE VISIT (OUTPATIENT)
Dept: NEUROLOGY | Facility: CLINIC | Age: 75
End: 2020-02-10
Payer: COMMERCIAL

## 2020-02-10 VITALS
HEIGHT: 70 IN | DIASTOLIC BLOOD PRESSURE: 68 MMHG | SYSTOLIC BLOOD PRESSURE: 118 MMHG | WEIGHT: 203 LBS | BODY MASS INDEX: 29.06 KG/M2 | HEART RATE: 55 BPM

## 2020-02-10 DIAGNOSIS — G47.10 HYPERSOMNIA: ICD-10-CM

## 2020-02-10 DIAGNOSIS — F32.0 MDD (MAJOR DEPRESSIVE DISORDER), SINGLE EPISODE, MILD (HCC): Primary | ICD-10-CM

## 2020-02-10 DIAGNOSIS — R41.9 COGNITIVE COMPLAINTS WITH NORMAL EXAM: ICD-10-CM

## 2020-02-10 DIAGNOSIS — G47.33 OSA (OBSTRUCTIVE SLEEP APNEA): ICD-10-CM

## 2020-02-10 PROCEDURE — 99214 OFFICE O/P EST MOD 30 MIN: CPT | Performed by: PSYCHIATRY & NEUROLOGY

## 2020-02-10 RX ORDER — METAXALONE 800 MG/1
800 TABLET ORAL 3 TIMES DAILY
COMMUNITY
End: 2021-09-29

## 2020-02-10 RX ORDER — AMITRIPTYLINE HYDROCHLORIDE 10 MG/1
10 TABLET, FILM COATED ORAL
COMMUNITY
End: 2021-09-29

## 2020-02-10 NOTE — PROGRESS NOTES
[facility] [facility]  PATIENT:  Rafa Moulton  MRN:  [id number]  :  1945  DATE OF SERVICE:  2/10/2020      Assessment/Plan:        Problem List Items Addressed This Visit        Respiratory    CHANTEL (obstructive sleep apnea)       Other    MDD (major depressive disorder), single episode, mild (HCC) - Primary    Relevant Medications    amitriptyline (ELAVIL) 10 mg tablet    Hypersomnia    Cognitive complaints with normal exam         We discussed concussions and the natural course of recovery of concussion    - We also discussed that symptoms from a concussion typically take 2-4 weeks to resolve, but that sometimes symptoms can linger on due to contributing factors (such as depression and anxiety)  -  Newer research shows that the sooner one returns to their normal routine, the sooner recovery typically occurs following concussion  We recommend gradual return to their normal cognitive and physical activity over the next days to weeks  Reviewed with patient that they are not doing any damage to their brain by gradually pushing through symptoms  Their symptoms may wax and wane until they are back to baseline, but that these symptoms are no longer directly related to the pathophysiology of concussion and often are related to contributing factors including deconditioning or previous avoidance of the activity  Cognitive concerns:  (Suspect multifactorial given history of depression/anxiety/sleep apnea with REM sleep behavior disorder)  Work up:   - lab:  B12, vitamin-D, TSH  - Continue Aspirin 81mg per day  Cerebral vascular risk factors should be managed by the expertise of the patients primary care physician  - We will offer neuropsychology testing as option to pursue with this patient  This will be gives a more indepth idea of areas of cognition most affected and a baseline cognitive function to compare to in the future    - The patient was encouraged to be physically and mentally active  To partake in reading, cross word puzzles, cognitive reinforcement games  - Reviewed with patient in length regarding exercising at least three times a week and cognitive stimulation  As some research suggested that it may slow the progression of the cognitive decline and shows improvement in some cognitive measures       Obstructive sleep apnea/REM sleep behavior disorder:  Refer patient to Sleep Medicine for evaluation and treatment  Importance of Healthy Sleep:  Behavioral sleep changes can promote restful, regular sleep  Simple changes like establishing consistent sleep and wake-up times, as well as getting between 7 and 8 hours of sleep a day, can make a world of difference  Experts also recommend avoiding substances that impair sleep, like caffeine, nicotine and alcohol, and also suggest winding down before bed to prevent sleep problems  Exercising:   When one exercises, the body releases endorphins, which are the bodys natural painkillers  Exercise reduces stress and helps individuals to sleep at night  Exercising at least 30 to 40 minutes 3 times a week is sufficient for most patients  When exercising, follow this plan :  - First, stay hydrated before, during, and after exercise  - Second part of the exercise plan is to eat sufficient food about an hour and a half before you exercise  Exercise causes ones blood sugar level to decrease, and it is important to have a source of energy    - Final part of the exercise plan is to warm-up  Do not jump into sudden, vigorous exercise         Neck pain:   Continue physical therapy on your own and also follow the instructions below for neck exercises  - Neck pathology and poor posture, with straightening of the normal cervical lordosis, can cause headaches  Tightening of the neck muscles can irritate the nerves in the occipital region of the head and cause or worsen head pain   Thus neck strengthening and relaxation exercises, can help improve this particular pain  It is importance to have good posture for improving shoulder, neck, and head pain  - Here are some exercises which should take 5 minutes:     1  Standing, drop your head to one side while continuing to look ahead  Hold for 10 seconds then swap sides  Repeat twice more each side  To increase the stretch, drop the opposite shoulder  2  Standing again, lower your chin to your chest, hold for 10 and then look up to the ceiling and hold for 10  Repeat twice more  3  Next, standing straight again, look over your right shoulder and hold firm for 10 seconds, then over your left shoulder for 10  Repeat this 3 times  4  Finally, while sitting upright, bring your head forward and hold for 10, then all the way back and hold for 10  If this simple exercise does not help improve the posture, we will consider formal physical therapy in the future             Please call with any questions or concerns  Office number is [address] Monitoring Program report was reviewed and was appropriate       History of Present Illness: We had the pleasure of evaluating Moises Baugh in neurological follow up today  As you know he is a 68 year old left handed male  He is a  and is here today for evaluation of his memory concerns           Current medical illnesses:  - QTC - 7/2/2018 - 5  - Last date of colonoscopy? About 5 years ago  History Tobacco use: none  MI about a year ago  CHANTEL and has REM sleep   Hypertension  Major depression/anxiety - he is seeing psychiatry and that has been helpful  Pt states he is tired and is not able to sleep       Cervicalgia:  Patient has had physical therapy done and states that he continues to notice neck and back of the head pain which is mild but there    MRI C spine:  2016  IMPRESSION: No evidence for osseous or ligamentous injury within the cervical spine  No evidence of cord deformity/compression or cord signal abnormal artery  Moderate to severe left-sided neural foraminal stenosis at C3-C4   Otherwise, mild to moderate degrees of stenosis as detailed level by level  Heterogeneous and partially cystic lesion within the superficial lobe of the right parotid gland measuring 1 1 x 0 7 cm   Although nonspecific, lesions within the parotid reflect pleomorphic adenoma/benign mixed tumors   However, consider correlation with tissue sampling or MRI as clinically warranted         Memory concerns:    States that over the last 3 years he has noticed more memory problems  States that at work he seems to be very organized, writes everything down and has a scheduled that he follows  Memory does not seem to affect his work  States that memory is more of a concern in his personal life and it is affecting him significantly  He is forgetting events and conversation with his family and friends  And when they bring it up he has no recollection of the conversation he had with them and thus he feels embarrassed and upset  His family friend also feel frustrated with him in regards to this as well   since last seen no improvement      Mood:  - He is taking Prozac 40 mg once a day  Feels that mood and anxiety is controlled to some degree         Left arm pain: he states his left arm pain started to occur again and he had 10 steroid injection and that seems to affect his mood  Insomnia:   -  Has trouble falling a sleep and maintaining a sleep    - he is getting 4 to 7 hours now    Patient also states that he has noted that he is having more dreams and is acting out his dreams  He has never had a sleep study done          Photosensitivity and phonophobia:   - Improved but can occur occasionally  It tends to occur at night tight now only       Have you ever had any Brain imaging? yes    I personally reviewed these images  Recent laboratory data was reviewed    Medications and allergies were reviewed      04/14/2017-MRI brain:  - IMPRESSION:  No significant intracranial abnormalities, consistent with prior MRI study  Incompletely visualized right parotid nodule measuring approximately 11 mm which appears to correlate with ultrasound-guided biopsy   Further assessment via IV contrast-enhanced MRI or CT may be useful if clinically appropriate    Past Medical History:   Diagnosis Date    Cancer ([facility] [address] )     Skin    Concussion     [facility]     Hypertension        Patient Active Problem List   Diagnosis    Cervical radiculopathy    MDD (major depressive disorder), single episode, mild (HCC)    PTSD (post-traumatic stress disorder)    NSTEMI type 1     Accelerated hypertension on admission - history of Essential hypertension    Adjustment disorder with anxiety    Numbness and tingling in left hand    Insomnia    CHANTEL (obstructive sleep apnea)    Hypersomnia    REM behavioral disorder    Cognitive complaints with normal exam       Medications:      Current Outpatient Medications   Medication Sig Dispense Refill    amitriptyline (ELAVIL) 10 mg tablet Take 10 mg by mouth daily at bedtime      aspirin 81 MG tablet Take 81 mg by mouth daily       atorvastatin (LIPITOR) 40 mg tablet Take 1 tablet (40 mg total) by mouth daily with dinner 30 tablet 0    FLUoxetine (PROzac) 40 MG capsule Take 1 capsule (40 mg total) by mouth daily 30 capsule 3    LORazepam (ATIVAN) 0 5 mg tablet Take 1-2 tablets (0 5-1 mg total) by mouth daily as needed (anticiapted high anxiety event, or when high anxiety occurs  ) 10 tablet 1    losartan (COZAAR) 50 mg tablet Take 50 mg by mouth daily      metaxalone (SKELAXIN) 800 mg tablet Take 800 mg by mouth 3 (three) times a day      metoprolol tartrate (LOPRESSOR) 25 mg tablet Take 1 tablet (25 mg total) by mouth every 12 (twelve) hours 60 tablet 0    nitroglycerin (NITROSTAT) 0 4 mg SL tablet Place 1 tablet (0 4 mg total) under the tongue every 5 (five) minutes as needed for chest pain 90 tablet 0    prazosin (MINIPRESS) 1 mg capsule Take 1mg HS for 5 days, then 2mg nightly for 5 days, then 3mg nightly  90 capsule 1    cloNIDine (CATAPRES) 0 1 mg tablet TAKE 1 TABLET (0 1 MG TOTAL) BY MOUTH DAILY AT BEDTIME (Patient not taking: Reported on 2/10/2020) 90 tablet 0     No current facility-administered medications for this visit  Allergies:       Allergies   Allergen Reactions    Codeine     Hydrocodone Nausea Only    Other      Annotation - [id number]: anesthesia - took a long time to wake up and was very nauseated and with vomiting    Oxycodone GI Intolerance, Nausea Only and Abdominal Pain    Prednisone Other (See Comments)     Other reaction(s): Depression, mood swings, anger  psychosis       Family History:     Family History   Problem Relation Age of Onset    Congenital heart disease Mother    [facility] Breast cancer Mother     Stomach cancer Father     Heart attack Paternal Grandmother        Social History:     Social History     Socioeconomic History    Marital status: /Civil Union     Spouse name: Not on file    Number of children: Not on file    Years of education: Not on file    Highest education level: Not on file   Occupational History    Not on file   Social Needs    Financial resource strain: Not on file    Food insecurity:     Worry: Not on file     Inability: Not on file    Transportation needs:     Medical: Not on file     Non-medical: Not on file   Tobacco Use    Smoking status: Never Smoker    Smokeless tobacco: Never Used   Substance and Sexual Activity    Alcohol use: Yes     Comment: 1 bottle of wine a week    Drug use: No    Sexual activity: Yes     Partners: Female   Lifestyle    Physical activity:     Days per week: Not on file     Minutes per session: Not on file    Stress: Not on file   Relationships    Social connections:     Talks on phone: Not on file     Gets together: Not on file     Attends Druze service: Not on file     Active member of club or organization: Not on file     Attends meetings of clubs or organizations: Not on file     Relationship status: Not on file    Intimate partner violence:     Fear of current or ex partner: Not on file     Emotionally abused: Not on file     Physically abused: Not on file     Forced sexual activity: Not on file   Other Topics Concern    Not on file   Social History Narrative    Not on file         Objective:   Physical Exam:                                                                   Vitals:            /68 (BP Location: Left arm, Patient Position: Sitting, Cuff Size: Adult)   Pulse 55   Ht 5' 10" (1 778 m)   Wt 92 1 kg (203 lb)   BMI 29 13 kg/m²   BP Readings from Last 3 Encounters:   02/10/20 118/68   01/21/20 150/89   11/11/19 146/94     Pulse Readings from Last 3 Encounters:   02/10/20 55   11/11/19 57   04/10/19 55            CONSTITUTIONAL: Well developed, well nourished, well groomed  No dysmorphic features  Eyes:  PERRLA, EOM normal      Neck:  Normal ROM, neck supple  HEENT:  Normocephalic atraumatic  No meningismus  Oropharynx is clear and moist  No oral mucosal lesions  Chest:  Respirations regular and unlabored  Cardiovascular:  Distal extremities warm without palpable edema or tenderness, no observed significant swelling  Musculoskeletal:  Full range of motion  Skin:  warm and dry   Psychiatric:  Normal behavior and appropriate affect        Neurological Examination:   Mental status/cognitive function: Orientated to time, place and person       Cranial Nerves: 2 to 12 intact    Motor Exam:    5/5 in the right upper extremity  5/5 in the left upper extremity  5/5 in the right lower extremity  5/5 in the left lower extremity   No spasticity noted    Sensory:   Intact to light touch /pinprick in the upper extremity  Intact to light touch /pinprick in the lower extremity    Reflexes:   2/4  right upper extremity  2/4  left upper extremity  2/4 left lower extremity  2/4 right lower extremity  No clonus noted    Coordination: Finger to nose intact bilaterally, no tremor noted    Gait:   - Steady casual gait  - Able to do tandem gait without difficulty  - Negative Romberg         Review of Systems:   Review of Systems   Constitutional: Negative  HENT: Negative  Eyes: Negative  Respiratory: Negative  Cardiovascular: Negative  Gastrointestinal: Negative  Endocrine: Negative  Musculoskeletal: Negative  Allergic/Immunologic: Negative  Neurological: Negative  Hematological: Negative  Psychiatric/Behavioral: Positive for agitation and confusion  All other systems reviewed and are negative  I have spent 30 minutes with Patient  today in which greater than 50% of this time was spent in counseling/coordination of care regarding Diagnostic results, Prognosis, Risks and benefits of tx options, Intructions for management, Patient and family education, Importance of tx compliance, Risk factor reductions, Impressions and Plan of care as above           Author:  Christina Aleman MD 2/10/2020 10:09 AM

## 2020-02-12 DIAGNOSIS — F43.10 PTSD (POST-TRAUMATIC STRESS DISORDER): ICD-10-CM

## 2020-02-12 RX ORDER — PRAZOSIN HYDROCHLORIDE 1 MG/1
CAPSULE ORAL
Qty: 90 CAPSULE | Refills: 1 | Status: SHIPPED | OUTPATIENT
Start: 2020-02-12 | End: 2020-03-20

## 2020-03-20 DIAGNOSIS — F43.10 PTSD (POST-TRAUMATIC STRESS DISORDER): ICD-10-CM

## 2020-03-20 RX ORDER — PRAZOSIN HYDROCHLORIDE 1 MG/1
CAPSULE ORAL
Qty: 90 CAPSULE | Refills: 1 | Status: SHIPPED | OUTPATIENT
Start: 2020-03-20 | End: 2021-09-29

## 2020-05-04 ENCOUNTER — TELEPHONE (OUTPATIENT)
Dept: NEUROLOGY | Facility: CLINIC | Age: 75
End: 2020-05-04

## 2020-05-19 ENCOUNTER — TELEPHONE (OUTPATIENT)
Dept: NEUROLOGY | Facility: CLINIC | Age: 75
End: 2020-05-19

## 2020-05-26 ENCOUNTER — TELEPHONE (OUTPATIENT)
Dept: NEUROLOGY | Facility: CLINIC | Age: 75
End: 2020-05-26

## 2020-05-27 ENCOUNTER — TELEPHONE (OUTPATIENT)
Dept: NEUROLOGY | Facility: CLINIC | Age: 75
End: 2020-05-27

## 2020-08-10 PROBLEM — Z12.11 SCREENING FOR MALIGNANT NEOPLASM OF COLON: Status: ACTIVE | Noted: 2020-08-10

## 2020-08-10 PROBLEM — K59.9 FUNCTIONAL BOWEL DISORDER: Status: ACTIVE | Noted: 2020-08-10

## 2020-08-10 NOTE — H&P (VIEW-ONLY)
Colon and Rectal Surgery   Mimi Recio 70 y o  male MRN [id number]  Encounter: [id number]  08/10/20 9:22 AM            Assessment: Mimi Recio is a 70 y o  male who has mild functional bowel disfunction  Plan:   Screening for malignant neoplasm of colon  He has not had colonoscopy in 10 years  We reviewed options that include colonoscopy or stool tests  He has no added risk factors other than gastric cancer in his father in his 80s  Functional bowel disorder  This is mild  Simple high-fiber intake is recommended  He is to call if his symptoms worsen  He has some reflux symptoms that are only occasional and her treated quite quickly with Tums  He knows to call should that change, given the slightly increased risk due to his father's history of stomach cancer  Subjective     HPI    Mimi Recio is a 70 y o  male is here today for evaluation of change in bowel habits  He complains of diarrhea for the past 2 weeks  He denies any abdominal pain or rectal bleeding  His last colonoscopy was 1/12/2010, which was within normal limits  Recall colonoscopy 7 years        Historical Information   Past Medical History:   Diagnosis Date    [facility]     Skin    Concussion     Heart attack ([facility] )     Hypertension      Past Surgical History:   Procedure Laterality Date    HAND SURGERY      Trigger finger x 3    KNEE SURGERY Bilateral     Scope       Meds/Allergies       Current Outpatient Medications:     amitriptyline (ELAVIL) 10 mg tablet, Take 10 mg by mouth daily at bedtime, Disp: , Rfl:     amLODIPine (NORVASC) 5 mg tablet, Take 5 mg by mouth daily, Disp: , Rfl:     aspirin 81 MG tablet, Take 81 mg by mouth daily , Disp: , Rfl:     atorvastatin (LIPITOR) 40 mg tablet, Take 1 tablet (40 mg total) by mouth daily with dinner, Disp: 30 tablet, Rfl: 0    cloNIDine (CATAPRES) 0 1 mg tablet, TAKE 1 TABLET (0 1 MG TOTAL) BY MOUTH DAILY AT BEDTIME (Patient not taking: Reported on 2/10/2020), Disp: 90 tablet, Rfl: 0    FLUoxetine (PROzac) 40 MG capsule, Take 1 capsule (40 mg total) by mouth daily, Disp: 30 capsule, Rfl: 3    LORazepam (ATIVAN) 0 5 mg tablet, Take 1-2 tablets (0 5-1 mg total) by mouth daily as needed (anticiapted high anxiety event, or when high anxiety occurs ), Disp: 10 tablet, Rfl: 1    losartan (COZAAR) 50 mg tablet, Take 50 mg by mouth daily, Disp: , Rfl:     metaxalone (SKELAXIN) 800 mg tablet, Take 800 mg by mouth 3 (three) times a day, Disp: , Rfl:     metoprolol tartrate (LOPRESSOR) 25 mg tablet, Take 1 tablet (25 mg total) by mouth every 12 (twelve) hours, Disp: 60 tablet, Rfl: 0    nitroglycerin (NITROSTAT) 0 4 mg SL tablet, Place 1 tablet (0 4 mg total) under the tongue every 5 (five) minutes as needed for chest pain, Disp: 90 tablet, Rfl: 0    prazosin (MINIPRESS) 1 mg capsule, TAKE 1 AT BEDTIME FOR 5 DAYS, THEN 2 NIGHTLY FOR 5 DAYS, THEN 3 NIGHTLY , Disp: 90 capsule, Rfl: 1  Allergies   Allergen Reactions    Codeine     Hydrocodone Nausea Only    Other      Annotation - [id number]: anesthesia - took a long time to wake up and was very nauseated and with vomiting    Oxycodone GI Intolerance, Nausea Only and Abdominal Pain    Prednisone Other (See Comments)     Other reaction(s): Depression, mood swings, anger  psychosis       Social History   Social History     Substance and Sexual Activity   Drug Use No     Social History     Tobacco Use   Smoking Status Never Smoker   Smokeless Tobacco Never Used         Family History   Problem Relation Age of Onset    Congenital heart disease Mother     Breast cancer Mother     Stomach cancer Father     Heart attack Paternal Grandmother          Review of Systems   Constitutional: Negative  Respiratory: Negative  Cardiovascular: Negative  Gastrointestinal: Positive for diarrhea         Objective   Current Vitals:  Vitals:    08/10/20 0911   Weight: 93 kg (205 lb)   Height: 5' 10" (1 778 m) Physical Exam  Constitutional:       Appearance: Normal appearance  Cardiovascular:      Pulses: Normal pulses  Heart sounds: Normal heart sounds  Pulmonary:      Effort: Pulmonary effort is normal       Breath sounds: Normal breath sounds  Abdominal:      General: Abdomen is flat  Palpations: Abdomen is soft  There is no mass  Tenderness: There is no abdominal tenderness  Skin:     General: Skin is warm and dry  Neurological:      General: No focal deficit present  Mental Status: He is alert and oriented to person, place, and time

## 2020-09-02 ENCOUNTER — ANESTHESIA EVENT (OUTPATIENT)
Dept: GASTROENTEROLOGY | Facility: HOSPITAL | Age: 75
End: 2020-09-02

## 2020-09-02 ENCOUNTER — HOSPITAL ENCOUNTER (OUTPATIENT)
Dept: GASTROENTEROLOGY | Facility: HOSPITAL | Age: 75
Setting detail: OUTPATIENT SURGERY
Discharge: HOME | End: 2020-09-02
Attending: COLON & RECTAL SURGERY | Admitting: COLON & RECTAL SURGERY
Payer: COMMERCIAL

## 2020-09-02 ENCOUNTER — ANESTHESIA (OUTPATIENT)
Dept: GASTROENTEROLOGY | Facility: HOSPITAL | Age: 75
End: 2020-09-02

## 2020-09-02 VITALS
DIASTOLIC BLOOD PRESSURE: 86 MMHG | RESPIRATION RATE: 18 BRPM | OXYGEN SATURATION: 98 % | BODY MASS INDEX: 29.41 KG/M2 | HEART RATE: 68 BPM | TEMPERATURE: 97.3 F | SYSTOLIC BLOOD PRESSURE: 128 MMHG | WEIGHT: 205 LBS

## 2020-09-02 DIAGNOSIS — Z12.11 SCREENING FOR MALIGNANT NEOPLASM OF COLON: ICD-10-CM

## 2020-09-02 PROCEDURE — 45385 COLONOSCOPY W/LESION REMOVAL: CPT | Performed by: COLON & RECTAL SURGERY

## 2020-09-02 PROCEDURE — 88305 TISSUE EXAM BY PATHOLOGIST: CPT | Performed by: PATHOLOGY

## 2020-09-02 RX ORDER — PROPOFOL 10 MG/ML
INJECTION, EMULSION INTRAVENOUS AS NEEDED
Status: DISCONTINUED | OUTPATIENT
Start: 2020-09-02 | End: 2020-09-02

## 2020-09-02 RX ORDER — SODIUM CHLORIDE, SODIUM LACTATE, POTASSIUM CHLORIDE, CALCIUM CHLORIDE 600; 310; 30; 20 MG/100ML; MG/100ML; MG/100ML; MG/100ML
INJECTION, SOLUTION INTRAVENOUS CONTINUOUS PRN
Status: DISCONTINUED | OUTPATIENT
Start: 2020-09-02 | End: 2020-09-02

## 2020-09-02 RX ORDER — LIDOCAINE HYDROCHLORIDE 10 MG/ML
INJECTION, SOLUTION EPIDURAL; INFILTRATION; INTRACAUDAL; PERINEURAL AS NEEDED
Status: DISCONTINUED | OUTPATIENT
Start: 2020-09-02 | End: 2020-09-02

## 2020-09-02 RX ORDER — PROPOFOL 10 MG/ML
INJECTION, EMULSION INTRAVENOUS CONTINUOUS PRN
Status: DISCONTINUED | OUTPATIENT
Start: 2020-09-02 | End: 2020-09-02

## 2020-09-02 RX ADMIN — PROPOFOL 30 MG: 10 INJECTION, EMULSION INTRAVENOUS at 10:53

## 2020-09-02 RX ADMIN — LIDOCAINE HYDROCHLORIDE 50 MG: 10 INJECTION, SOLUTION EPIDURAL; INFILTRATION; INTRACAUDAL; PERINEURAL at 10:47

## 2020-09-02 RX ADMIN — PROPOFOL 100 MCG/KG/MIN: 10 INJECTION, EMULSION INTRAVENOUS at 10:55

## 2020-09-02 RX ADMIN — PROPOFOL 100 MG: 10 INJECTION, EMULSION INTRAVENOUS at 10:47

## 2020-09-02 RX ADMIN — PROPOFOL 20 MG: 10 INJECTION, EMULSION INTRAVENOUS at 10:57

## 2020-09-02 RX ADMIN — SODIUM CHLORIDE, SODIUM LACTATE, POTASSIUM CHLORIDE, AND CALCIUM CHLORIDE: .6; .31; .03; .02 INJECTION, SOLUTION INTRAVENOUS at 10:42

## 2020-09-02 RX ADMIN — PROPOFOL 50 MG: 10 INJECTION, EMULSION INTRAVENOUS at 10:49

## 2020-09-02 RX ADMIN — PROPOFOL 50 MG: 10 INJECTION, EMULSION INTRAVENOUS at 10:48

## 2020-09-02 NOTE — ANESTHESIA PREPROCEDURE EVALUATION
Procedure:  COLONOSCOPY    Relevant Problems   ANESTHESIA (within normal limits)      CARDIO  LVEF 60%   (+) Accelerated hypertension on admission - history of Essential hypertension   (+) NSTEMI type 1  (s/p RCA SRINIVASA 2018)      NEURO/PSYCH   (+) MDD (major depressive disorder), single episode, mild (HCC)   (+) Numbness and tingling in left hand   (+) PTSD (post-traumatic stress disorder)      PULMONARY   (+) CHANTEL (obstructive sleep apnea)        Physical Exam    Airway    Mallampati score: II  TM Distance: >3 FB  Neck ROM: full     Dental       Cardiovascular      Pulmonary      Other Findings        Anesthesia Plan  ASA Score- 3     Anesthesia Type- IV sedation with anesthesia with ASA Monitors  Additional Monitors:   Airway Plan:           Plan Factors-Exercise tolerance (METS): >4 METS  Chart reviewed  EKG reviewed  Existing labs reviewed  Patient summary reviewed  Patient is not a current smoker  Obstructive sleep apnea risk education given perioperatively  Induction-     Postoperative Plan-     Informed Consent- Anesthetic plan and risks discussed with patient  I personally reviewed this patient with the CRNA  Discussed and agreed on the Anesthesia Plan with the CRNA  Emerson Curran

## 2020-09-02 NOTE — INTERVAL H&P NOTE
H&P reviewed  After examining the patient I find no changes in the patients condition since the H&P had been written      Vitals:    09/02/20 1023   BP: 147/90   Pulse: 72   Resp: 16   Temp: (!) 97 3 °F (36 3 °C)   SpO2: 98%

## 2020-09-02 NOTE — ANESTHESIA POSTPROCEDURE EVALUATION
Post-Op Assessment Note    CV Status:  Stable  Pain Score: 0    Pain management: adequate     Mental Status:  Sleepy   Hydration Status:  Stable   PONV Controlled:  None   Airway Patency:  Patent and adequate      Post Op Vitals Reviewed: Yes      Staff: CRNA         No complications documented      BP   116/72   Temp     Pulse  62   Resp   16   SpO2   99%

## 2021-04-16 DIAGNOSIS — F43.10 PTSD (POST-TRAUMATIC STRESS DISORDER): ICD-10-CM

## 2021-04-19 NOTE — TELEPHONE ENCOUNTER
JEROME on Sebastien's VM that I was following up on an automated script request   Direct nursing number provided for return call

## 2021-05-19 RX ORDER — PRAZOSIN HYDROCHLORIDE 1 MG/1
CAPSULE ORAL
Qty: 90 CAPSULE | Refills: 0 | OUTPATIENT
Start: 2021-05-19

## 2021-08-31 ENCOUNTER — TELEPHONE (OUTPATIENT)
Dept: BEHAVIORAL/MENTAL HEALTH CLINIC | Facility: CLINIC | Age: 76
End: 2021-08-31

## 2021-08-31 NOTE — TELEPHONE ENCOUNTER
A web inquiry came in from a [facility] email for marriage counseling and the auto web response from us was "we offer therapy services within [place]"   Come to find out Mr Vincenzo Aly works for [employer]  [address] [place]  I spoke to him to let him know this is not a school-based appt  but would have to be scheduled by our outpatient dept  And that someone would contact him from there  He was good with this info

## 2021-09-14 DIAGNOSIS — F43.10 PTSD (POST-TRAUMATIC STRESS DISORDER): ICD-10-CM

## 2021-09-14 RX ORDER — FLUOXETINE HYDROCHLORIDE 40 MG/1
40 CAPSULE ORAL DAILY
Qty: 30 CAPSULE | Refills: 0 | Status: SHIPPED | OUTPATIENT
Start: 2021-09-14 | End: 2021-09-29 | Stop reason: SDUPTHER

## 2021-09-14 NOTE — TELEPHONE ENCOUNTER
Unknown person called and left a message in regards to Sohan Poag  She is looking to get him scheduled with Dr Dewey as he does need medication refill  Stated that this was the 4th time shes called an no one has gotten back to her  Sohan Poag has not seen [facility] [place] since January 2020  Staff called Sohan Poag in April and left a message to schedule  Staff never heard back   Patient did speak with Intake on 9/2 to get scheduled for a therapist

## 2021-09-14 NOTE — TELEPHONE ENCOUNTER
Wily Cervantes took appointment on 9/20 at 12pm      He is also hoping to get a refill on his Prozac as he does not have enough to last until his appointment

## 2021-09-20 ENCOUNTER — TELEPHONE (OUTPATIENT)
Dept: PSYCHIATRY | Facility: CLINIC | Age: 76
End: 2021-09-20

## 2021-09-20 NOTE — TELEPHONE ENCOUNTER
Yane Quintana came into the office - he was under the impression he had an apt with Dr Goldie Garces @ [phone] today    Scheduled an apt for 11/17, but patient is asking if there is anything sooner
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
## 2021-09-29 ENCOUNTER — OFFICE VISIT (OUTPATIENT)
Dept: PSYCHIATRY | Facility: CLINIC | Age: 76
End: 2021-09-29
Payer: COMMERCIAL

## 2021-09-29 VITALS — DIASTOLIC BLOOD PRESSURE: 72 MMHG | HEART RATE: 70 BPM | SYSTOLIC BLOOD PRESSURE: 114 MMHG

## 2021-09-29 DIAGNOSIS — F51.05 INSOMNIA DUE TO OTHER MENTAL DISORDER: ICD-10-CM

## 2021-09-29 DIAGNOSIS — F33.1 MDD (MAJOR DEPRESSIVE DISORDER), RECURRENT EPISODE, MODERATE (HCC): Primary | ICD-10-CM

## 2021-09-29 DIAGNOSIS — F99 INSOMNIA DUE TO OTHER MENTAL DISORDER: ICD-10-CM

## 2021-09-29 DIAGNOSIS — F43.10 PTSD (POST-TRAUMATIC STRESS DISORDER): ICD-10-CM

## 2021-09-29 PROBLEM — G47.10 HYPERSOMNIA: Status: RESOLVED | Noted: 2018-10-08 | Resolved: 2021-09-29

## 2021-09-29 PROBLEM — F33.2 MDD (MAJOR DEPRESSIVE DISORDER), RECURRENT SEVERE, WITHOUT PSYCHOSIS (HCC): Status: ACTIVE | Noted: 2017-08-25

## 2021-09-29 PROBLEM — G43.009 MIGRAINE WITHOUT AURA: Status: ACTIVE | Noted: 2017-08-01

## 2021-09-29 PROBLEM — F33.2 MDD (MAJOR DEPRESSIVE DISORDER), RECURRENT SEVERE, WITHOUT PSYCHOSIS (HCC): Status: RESOLVED | Noted: 2017-08-25 | Resolved: 2021-09-29

## 2021-09-29 PROCEDURE — 90833 PSYTX W PT W E/M 30 MIN: CPT | Performed by: PSYCHIATRY & NEUROLOGY

## 2021-09-29 PROCEDURE — 99214 OFFICE O/P EST MOD 30 MIN: CPT | Performed by: PSYCHIATRY & NEUROLOGY

## 2021-09-29 RX ORDER — ROSUVASTATIN CALCIUM 20 MG/1
20 TABLET, COATED ORAL
COMMUNITY
Start: 2021-09-23

## 2021-09-29 RX ORDER — FLUOXETINE HYDROCHLORIDE 40 MG/1
40 CAPSULE ORAL DAILY
Qty: 90 CAPSULE | Refills: 1 | Status: SHIPPED | OUTPATIENT
Start: 2021-09-29 | End: 2021-10-22 | Stop reason: SDUPTHER

## 2021-09-29 RX ORDER — TRAZODONE HYDROCHLORIDE 50 MG/1
25-50 TABLET ORAL
Qty: 30 TABLET | Refills: 1 | Status: SHIPPED | OUTPATIENT
Start: 2021-09-29 | End: 2021-10-22 | Stop reason: SDUPTHER

## 2021-09-29 NOTE — BH TREATMENT PLAN
Treatment Plan not completed within required time limits due to:    presenting  with emotional issues that required clinical intervention  He/ She was  very anxious and depressed  during the appointment  Discussed  developing a treatment plan at their  next scheduled appointment and patient is agreeable

## 2021-09-30 RX ORDER — PRAZOSIN HYDROCHLORIDE 1 MG/1
CAPSULE ORAL
Qty: 90 CAPSULE | Refills: 1 | Status: SHIPPED | OUTPATIENT
Start: 2021-09-30 | End: 2021-09-30

## 2021-10-22 ENCOUNTER — OFFICE VISIT (OUTPATIENT)
Dept: PSYCHIATRY | Facility: CLINIC | Age: 76
End: 2021-10-22
Payer: COMMERCIAL

## 2021-10-22 DIAGNOSIS — F33.1 MDD (MAJOR DEPRESSIVE DISORDER), RECURRENT EPISODE, MODERATE (HCC): ICD-10-CM

## 2021-10-22 DIAGNOSIS — F43.10 PTSD (POST-TRAUMATIC STRESS DISORDER): ICD-10-CM

## 2021-10-22 DIAGNOSIS — F99 INSOMNIA DUE TO OTHER MENTAL DISORDER: ICD-10-CM

## 2021-10-22 DIAGNOSIS — F51.05 INSOMNIA DUE TO OTHER MENTAL DISORDER: ICD-10-CM

## 2021-10-22 PROCEDURE — 90833 PSYTX W PT W E/M 30 MIN: CPT | Performed by: PSYCHIATRY & NEUROLOGY

## 2021-10-22 PROCEDURE — 99214 OFFICE O/P EST MOD 30 MIN: CPT | Performed by: PSYCHIATRY & NEUROLOGY

## 2021-10-22 RX ORDER — FLUOXETINE HYDROCHLORIDE 40 MG/1
40 CAPSULE ORAL DAILY
Qty: 90 CAPSULE | Refills: 1 | Status: SHIPPED | OUTPATIENT
Start: 2021-10-22 | End: 2021-11-26 | Stop reason: SDUPTHER

## 2021-10-22 RX ORDER — TRAZODONE HYDROCHLORIDE 50 MG/1
25-50 TABLET ORAL
Qty: 30 TABLET | Refills: 1 | Status: SHIPPED | OUTPATIENT
Start: 2021-10-22 | End: 2021-10-26

## 2021-10-26 DIAGNOSIS — F43.10 PTSD (POST-TRAUMATIC STRESS DISORDER): ICD-10-CM

## 2021-10-26 DIAGNOSIS — F51.05 INSOMNIA DUE TO OTHER MENTAL DISORDER: ICD-10-CM

## 2021-10-26 DIAGNOSIS — F33.1 MDD (MAJOR DEPRESSIVE DISORDER), RECURRENT EPISODE, MODERATE (HCC): ICD-10-CM

## 2021-10-26 DIAGNOSIS — F99 INSOMNIA DUE TO OTHER MENTAL DISORDER: ICD-10-CM

## 2021-10-26 RX ORDER — TRAZODONE HYDROCHLORIDE 50 MG/1
25-50 TABLET ORAL
Qty: 30 TABLET | Refills: 1 | Status: SHIPPED | OUTPATIENT
Start: 2021-10-26 | End: 2021-11-23

## 2021-11-22 DIAGNOSIS — F33.1 MDD (MAJOR DEPRESSIVE DISORDER), RECURRENT EPISODE, MODERATE (HCC): ICD-10-CM

## 2021-11-22 DIAGNOSIS — F43.10 PTSD (POST-TRAUMATIC STRESS DISORDER): ICD-10-CM

## 2021-11-22 DIAGNOSIS — F99 INSOMNIA DUE TO OTHER MENTAL DISORDER: ICD-10-CM

## 2021-11-22 DIAGNOSIS — F51.05 INSOMNIA DUE TO OTHER MENTAL DISORDER: ICD-10-CM

## 2021-11-23 RX ORDER — TRAZODONE HYDROCHLORIDE 50 MG/1
25-50 TABLET ORAL
Qty: 30 TABLET | Refills: 1 | Status: SHIPPED | OUTPATIENT
Start: 2021-11-23 | End: 2021-11-26 | Stop reason: SDUPTHER

## 2021-11-26 ENCOUNTER — OFFICE VISIT (OUTPATIENT)
Dept: PSYCHIATRY | Facility: CLINIC | Age: 76
End: 2021-11-26
Payer: COMMERCIAL

## 2021-11-26 DIAGNOSIS — F99 INSOMNIA DUE TO OTHER MENTAL DISORDER: ICD-10-CM

## 2021-11-26 DIAGNOSIS — F33.1 MDD (MAJOR DEPRESSIVE DISORDER), RECURRENT EPISODE, MODERATE (HCC): ICD-10-CM

## 2021-11-26 DIAGNOSIS — F51.05 INSOMNIA DUE TO OTHER MENTAL DISORDER: ICD-10-CM

## 2021-11-26 DIAGNOSIS — F43.10 PTSD (POST-TRAUMATIC STRESS DISORDER): ICD-10-CM

## 2021-11-26 PROCEDURE — 99214 OFFICE O/P EST MOD 30 MIN: CPT | Performed by: PSYCHIATRY & NEUROLOGY

## 2021-11-26 PROCEDURE — 90833 PSYTX W PT W E/M 30 MIN: CPT | Performed by: PSYCHIATRY & NEUROLOGY

## 2021-11-26 RX ORDER — TRAZODONE HYDROCHLORIDE 50 MG/1
25-50 TABLET ORAL
Qty: 30 TABLET | Refills: 1 | Status: SHIPPED | OUTPATIENT
Start: 2021-11-26 | End: 2022-01-22

## 2021-11-26 RX ORDER — TAMSULOSIN HYDROCHLORIDE 0.4 MG/1
0.4 CAPSULE ORAL
COMMUNITY
Start: 2021-11-12 | End: 2022-11-12

## 2021-11-26 RX ORDER — FLUOXETINE HYDROCHLORIDE 40 MG/1
40 CAPSULE ORAL DAILY
Qty: 90 CAPSULE | Refills: 1 | Status: SHIPPED | OUTPATIENT
Start: 2021-11-26 | End: 2022-07-06

## 2022-01-08 DIAGNOSIS — F51.05 INSOMNIA DUE TO OTHER MENTAL DISORDER: ICD-10-CM

## 2022-01-08 DIAGNOSIS — F99 INSOMNIA DUE TO OTHER MENTAL DISORDER: ICD-10-CM

## 2022-01-08 DIAGNOSIS — F33.1 MDD (MAJOR DEPRESSIVE DISORDER), RECURRENT EPISODE, MODERATE (HCC): ICD-10-CM

## 2022-01-08 DIAGNOSIS — F43.10 PTSD (POST-TRAUMATIC STRESS DISORDER): ICD-10-CM

## 2022-01-22 RX ORDER — TRAZODONE HYDROCHLORIDE 50 MG/1
25-50 TABLET ORAL
Qty: 30 TABLET | Refills: 1 | Status: SHIPPED | OUTPATIENT
Start: 2022-01-22 | End: 2022-03-31

## 2022-03-31 DIAGNOSIS — F51.05 INSOMNIA DUE TO OTHER MENTAL DISORDER: ICD-10-CM

## 2022-03-31 DIAGNOSIS — F43.10 PTSD (POST-TRAUMATIC STRESS DISORDER): ICD-10-CM

## 2022-03-31 DIAGNOSIS — F99 INSOMNIA DUE TO OTHER MENTAL DISORDER: ICD-10-CM

## 2022-03-31 DIAGNOSIS — F33.1 MDD (MAJOR DEPRESSIVE DISORDER), RECURRENT EPISODE, MODERATE (HCC): ICD-10-CM

## 2022-03-31 RX ORDER — TRAZODONE HYDROCHLORIDE 50 MG/1
25-50 TABLET ORAL
Qty: 30 TABLET | Refills: 1 | Status: SHIPPED | OUTPATIENT
Start: 2022-03-31 | End: 2022-04-28

## 2022-06-02 DIAGNOSIS — F33.1 MDD (MAJOR DEPRESSIVE DISORDER), RECURRENT EPISODE, MODERATE (HCC): ICD-10-CM

## 2022-06-02 DIAGNOSIS — F99 INSOMNIA DUE TO OTHER MENTAL DISORDER: ICD-10-CM

## 2022-06-02 DIAGNOSIS — F43.10 PTSD (POST-TRAUMATIC STRESS DISORDER): ICD-10-CM

## 2022-06-02 DIAGNOSIS — F51.05 INSOMNIA DUE TO OTHER MENTAL DISORDER: ICD-10-CM

## 2022-06-02 RX ORDER — TRAZODONE HYDROCHLORIDE 50 MG/1
25-50 TABLET ORAL
Qty: 30 TABLET | Refills: 0 | Status: SHIPPED | OUTPATIENT
Start: 2022-06-02 | End: 2022-07-06

## 2022-08-07 DIAGNOSIS — F43.10 PTSD (POST-TRAUMATIC STRESS DISORDER): ICD-10-CM

## 2022-08-08 RX ORDER — FLUOXETINE HYDROCHLORIDE 40 MG/1
CAPSULE ORAL
Qty: 30 CAPSULE | Refills: 0 | OUTPATIENT
Start: 2022-08-08

## 2022-10-07 ENCOUNTER — TELEPHONE (OUTPATIENT)
Dept: PSYCHIATRY | Facility: CLINIC | Age: 77
End: 2022-10-07

## 2023-01-19 ENCOUNTER — TELEPHONE (OUTPATIENT)
Dept: PSYCHIATRY | Facility: CLINIC | Age: 78
End: 2023-01-19

## 2023-01-19 NOTE — TELEPHONE ENCOUNTER
Kia Weller left a message at [facility]  He is looking to make an appointment with Dr Lev Weston  Patient has not been seen since 11/2021  Please Advise  Staff

## 2023-01-23 NOTE — TELEPHONE ENCOUNTER
Reached out to pt initially to schedule therapy services as I was working the therapy wait list  Pt brought up scheduling services with Dr Daryl Jimenez  spoke with pt regarding notes above  Patient  stated would like a sooner appt,  Writer advised pt that I would reach out to provider regarding sooner availability and get back to him

## 2023-02-10 ENCOUNTER — OFFICE VISIT (OUTPATIENT)
Dept: PSYCHIATRY | Facility: CLINIC | Age: 78
End: 2023-02-10

## 2023-02-10 ENCOUNTER — TELEPHONE (OUTPATIENT)
Dept: PSYCHIATRY | Facility: CLINIC | Age: 78
End: 2023-02-10

## 2023-02-10 DIAGNOSIS — Z13.21 SCREENING FOR ENDOCRINE, NUTRITIONAL, METABOLIC AND IMMUNITY DISORDER: ICD-10-CM

## 2023-02-10 DIAGNOSIS — F43.22 ADJUSTMENT DISORDER WITH ANXIETY: ICD-10-CM

## 2023-02-10 DIAGNOSIS — Z13.0 SCREENING FOR ENDOCRINE, NUTRITIONAL, METABOLIC AND IMMUNITY DISORDER: ICD-10-CM

## 2023-02-10 DIAGNOSIS — Z13.29 SCREENING FOR ENDOCRINE, NUTRITIONAL, METABOLIC AND IMMUNITY DISORDER: ICD-10-CM

## 2023-02-10 DIAGNOSIS — R41.89 COGNITIVE DECLINE: ICD-10-CM

## 2023-02-10 DIAGNOSIS — F33.1 MDD (MAJOR DEPRESSIVE DISORDER), RECURRENT EPISODE, MODERATE (HCC): Primary | ICD-10-CM

## 2023-02-10 DIAGNOSIS — E63.8 NUTRITION DEFICIENCY DUE TO A PARTICULAR KIND OF FOOD: ICD-10-CM

## 2023-02-10 DIAGNOSIS — Z13.228 SCREENING FOR ENDOCRINE, NUTRITIONAL, METABOLIC AND IMMUNITY DISORDER: ICD-10-CM

## 2023-02-10 DIAGNOSIS — F43.10 PTSD (POST-TRAUMATIC STRESS DISORDER): ICD-10-CM

## 2023-02-10 DIAGNOSIS — F51.05 INSOMNIA DUE TO OTHER MENTAL DISORDER: ICD-10-CM

## 2023-02-10 DIAGNOSIS — F99 INSOMNIA DUE TO OTHER MENTAL DISORDER: ICD-10-CM

## 2023-02-10 PROBLEM — M17.12 PRIMARY OSTEOARTHRITIS OF LEFT KNEE: Status: ACTIVE | Noted: 2022-02-15

## 2023-02-10 RX ORDER — TRAZODONE HYDROCHLORIDE 50 MG/1
25-50 TABLET ORAL
Qty: 30 TABLET | Refills: 0 | Status: SHIPPED | OUTPATIENT
Start: 2023-02-10

## 2023-02-10 RX ORDER — FLUOXETINE 10 MG/1
CAPSULE ORAL
Qty: 30 CAPSULE | Refills: 2 | Status: SHIPPED | OUTPATIENT
Start: 2023-02-10

## 2023-02-10 RX ORDER — FLUOXETINE HYDROCHLORIDE 40 MG/1
40 CAPSULE ORAL DAILY
Qty: 90 CAPSULE | Refills: 1 | Status: SHIPPED | OUTPATIENT
Start: 2023-02-10

## 2023-02-10 NOTE — BH TREATMENT PLAN
TREATMENT PLAN (Medication Management Only)        [facility]    Name/Date of Birth/MRN:  Farhan Kelsey 78 y o  1945 MRN: [id number]  Date of Treatment Plan: February 10, 2023  Diagnosis/Diagnoses:   1  MDD (major depressive disorder), recurrent episode, moderate ([facility] )    2  Cognitive decline    3  PTSD (post-traumatic stress disorder)    4  Insomnia due to other mental disorder    5  Nutrition deficiency due to a particular kind of food    6  Screening for endocrine, nutritional, metabolic and immunity disorder      Strengths/Personal Resources for Self-Care: good support, good job  Area/Areas of need (in own words): my bizarre dreams  1  Long Term Goal: "to get sleep"   Target Date: 180 days from treatment plan  Person/Persons responsible for completion of goal: Dr Lani Woodall and Self  2  Short Term Objective (s) - How will we reach this goal?:   A  Provider new recommended medication/dosage changes and/or continue medication(s): as noted  B   Establish with therapist  C  F/u with neuropsychology  Target Date: 6 months from treatment plan unless noted otherwise  Person/Persons Responsible for Completion of Goal: Dr Lani Woodall and Self   Progress Towards Goals: continuing treatment   Treatment Modality: Medication management and therapy PRN  Review due 180 days from date of this plan: Approximately 6 months from today ( 8/10/2023 )    Expected length of service: ongoing treatment unless revised  My Physician/PA/NP and I have developed this plan together and I agree to work on the goals and objectives  I understand the treatment goals that were developed for my treatment    Signature:       Date and time:  Signature of parent/guardian if under age of 15 years: Date and time:  Signature of provider:      Date and time:  Signature of Supervising Physician:    Date and time: 2/10/2023      Bula Bolus III
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
## 2023-02-27 ENCOUNTER — TELEPHONE (OUTPATIENT)
Dept: PSYCHIATRY | Facility: CLINIC | Age: 78
End: 2023-02-27

## 2023-02-27 NOTE — TELEPHONE ENCOUNTER
LM on Sebastien's VM that folate and B12 levels were good and informed him that he does not need to take any supplements for them  Instructed him to take Vit D 2000 IU daily  Also LM that he can take a B complex if he would like to see if there are any benefits such as energy or focus  Requested that he call the office if he has any further concerns

## 2023-03-10 ENCOUNTER — TELEPHONE (OUTPATIENT)
Dept: PSYCHIATRY | Facility: CLINIC | Age: 78
End: 2023-03-10

## 2023-03-10 NOTE — TELEPHONE ENCOUNTER
PT will get set up with [facility] and get a referral from them  They are aware the referral will be closed

## 2023-03-13 DIAGNOSIS — F43.10 PTSD (POST-TRAUMATIC STRESS DISORDER): ICD-10-CM

## 2023-03-13 DIAGNOSIS — F99 INSOMNIA DUE TO OTHER MENTAL DISORDER: ICD-10-CM

## 2023-03-13 DIAGNOSIS — F33.1 MDD (MAJOR DEPRESSIVE DISORDER), RECURRENT EPISODE, MODERATE (HCC): ICD-10-CM

## 2023-03-13 DIAGNOSIS — F51.05 INSOMNIA DUE TO OTHER MENTAL DISORDER: ICD-10-CM

## 2023-03-13 RX ORDER — TRAZODONE HYDROCHLORIDE 50 MG/1
25-50 TABLET ORAL
Qty: 90 TABLET | Refills: 1 | Status: SHIPPED | OUTPATIENT
Start: 2023-03-13

## 2023-03-13 RX ORDER — FLUOXETINE 10 MG/1
CAPSULE ORAL
Qty: 90 CAPSULE | Refills: 1 | Status: SHIPPED | OUTPATIENT
Start: 2023-03-13

## 2023-06-01 ENCOUNTER — TELEPHONE (OUTPATIENT)
Dept: PSYCHIATRY | Facility: CLINIC | Age: 78
End: 2023-06-01

## 2023-06-22 ENCOUNTER — TELEPHONE (OUTPATIENT)
Dept: PSYCHIATRY | Facility: CLINIC | Age: 78
End: 2023-06-22

## 2023-07-18 ENCOUNTER — HOSPITAL ENCOUNTER (OUTPATIENT)
Facility: HOSPITAL | Age: 78
Setting detail: OBSERVATION
Discharge: HOME | End: 2023-07-19
Attending: EMERGENCY MEDICINE | Admitting: INTERNAL MEDICINE
Payer: COMMERCIAL

## 2023-07-18 ENCOUNTER — APPOINTMENT (EMERGENCY)
Dept: RADIOLOGY | Facility: HOSPITAL | Age: 78
End: 2023-07-18
Payer: COMMERCIAL

## 2023-07-18 DIAGNOSIS — I24.9 ACS (ACUTE CORONARY SYNDROME) (HCC): Primary | ICD-10-CM

## 2023-07-18 DIAGNOSIS — I20.0 UNSTABLE ANGINA (HCC): ICD-10-CM

## 2023-07-18 PROBLEM — E78.5 HYPERLIPIDEMIA: Status: ACTIVE | Noted: 2023-07-18

## 2023-07-18 PROBLEM — I10 HYPERTENSION: Status: ACTIVE | Noted: 2023-07-18

## 2023-07-18 PROBLEM — R07.9 CHEST PAIN: Status: ACTIVE | Noted: 2023-07-18

## 2023-07-18 PROBLEM — F41.9 ANXIETY: Status: ACTIVE | Noted: 2023-07-18

## 2023-07-18 LAB
2HR DELTA HS TROPONIN: 1 NG/L
4HR DELTA HS TROPONIN: 0 NG/L
ALBUMIN SERPL BCP-MCNC: 2.5 G/DL (ref 3.5–5)
ALBUMIN SERPL BCP-MCNC: 3.7 G/DL (ref 3.5–5)
ALP SERPL-CCNC: 104 U/L (ref 34–104)
ALP SERPL-CCNC: 73 U/L (ref 34–104)
ALT SERPL W P-5'-P-CCNC: 13 U/L (ref 7–52)
ALT SERPL W P-5'-P-CCNC: 9 U/L (ref 7–52)
ANION GAP SERPL CALCULATED.3IONS-SCNC: 0 MMOL/L
ANION GAP SERPL CALCULATED.3IONS-SCNC: 3 MMOL/L
AST SERPL W P-5'-P-CCNC: 23 U/L (ref 13–39)
AST SERPL W P-5'-P-CCNC: 9 U/L (ref 13–39)
BASOPHILS # BLD AUTO: 0.05 THOUSANDS/ÂΜL (ref 0–0.1)
BASOPHILS NFR BLD AUTO: 1 % (ref 0–1)
BILIRUB SERPL-MCNC: 0.47 MG/DL (ref 0.2–1)
BILIRUB SERPL-MCNC: 0.68 MG/DL (ref 0.2–1)
BUN SERPL-MCNC: 10 MG/DL (ref 5–25)
BUN SERPL-MCNC: 7 MG/DL (ref 5–25)
CALCIUM ALBUM COR SERPL-MCNC: 6.8 MG/DL (ref 8.3–10.1)
CALCIUM SERPL-MCNC: 5.6 MG/DL (ref 8.4–10.2)
CALCIUM SERPL-MCNC: 8.9 MG/DL (ref 8.4–10.2)
CARDIAC TROPONIN I PNL SERPL HS: 4 NG/L
CARDIAC TROPONIN I PNL SERPL HS: 4 NG/L
CARDIAC TROPONIN I PNL SERPL HS: 5 NG/L
CARDIAC TROPONIN I PNL SERPL HS: 5 NG/L
CHLORIDE SERPL-SCNC: 106 MMOL/L (ref 96–108)
CHLORIDE SERPL-SCNC: 121 MMOL/L (ref 96–108)
CO2 SERPL-SCNC: 21 MMOL/L (ref 21–32)
CO2 SERPL-SCNC: 26 MMOL/L (ref 21–32)
CREAT SERPL-MCNC: 0.49 MG/DL (ref 0.6–1.3)
CREAT SERPL-MCNC: 0.75 MG/DL (ref 0.6–1.3)
EOSINOPHIL # BLD AUTO: 0.37 THOUSAND/ÂΜL (ref 0–0.61)
EOSINOPHIL NFR BLD AUTO: 5 % (ref 0–6)
ERYTHROCYTE [DISTWIDTH] IN BLOOD BY AUTOMATED COUNT: 12 % (ref 11.6–15.1)
GFR SERPL CREATININE-BSD FRML MDRD: 106 ML/MIN/1.73SQ M
GFR SERPL CREATININE-BSD FRML MDRD: 89 ML/MIN/1.73SQ M
GLUCOSE SERPL-MCNC: 51 MG/DL (ref 65–140)
GLUCOSE SERPL-MCNC: 85 MG/DL (ref 65–140)
HCT VFR BLD AUTO: 42.1 % (ref 36.5–49.3)
HGB BLD-MCNC: 14.4 G/DL (ref 12–17)
IMM GRANULOCYTES # BLD AUTO: 0.04 THOUSAND/UL (ref 0–0.2)
IMM GRANULOCYTES NFR BLD AUTO: 1 % (ref 0–2)
LYMPHOCYTES # BLD AUTO: 1.46 THOUSANDS/ÂΜL (ref 0.6–4.47)
LYMPHOCYTES NFR BLD AUTO: 18 % (ref 14–44)
MAGNESIUM SERPL-MCNC: 1.3 MG/DL (ref 1.9–2.7)
MAGNESIUM SERPL-MCNC: 2.6 MG/DL (ref 1.9–2.7)
MCH RBC QN AUTO: 32.9 PG (ref 26.8–34.3)
MCHC RBC AUTO-ENTMCNC: 34.2 G/DL (ref 31.4–37.4)
MCV RBC AUTO: 96 FL (ref 82–98)
MONOCYTES # BLD AUTO: 0.77 THOUSAND/ÂΜL (ref 0.17–1.22)
MONOCYTES NFR BLD AUTO: 10 % (ref 4–12)
NEUTROPHILS # BLD AUTO: 5.36 THOUSANDS/ÂΜL (ref 1.85–7.62)
NEUTS SEG NFR BLD AUTO: 65 % (ref 43–75)
NRBC BLD AUTO-RTO: 0 /100 WBCS
PLATELET # BLD AUTO: 160 THOUSANDS/UL (ref 149–390)
PMV BLD AUTO: 9.4 FL (ref 8.9–12.7)
POTASSIUM SERPL-SCNC: 2.3 MMOL/L (ref 3.5–5.3)
POTASSIUM SERPL-SCNC: 4.9 MMOL/L (ref 3.5–5.3)
PROT SERPL-MCNC: 4.2 G/DL (ref 6.4–8.4)
PROT SERPL-MCNC: 6.6 G/DL (ref 6.4–8.4)
RBC # BLD AUTO: 4.38 MILLION/UL (ref 3.88–5.62)
SODIUM SERPL-SCNC: 135 MMOL/L (ref 135–147)
SODIUM SERPL-SCNC: 142 MMOL/L (ref 135–147)
WBC # BLD AUTO: 8.05 THOUSAND/UL (ref 4.31–10.16)

## 2023-07-18 PROCEDURE — 83735 ASSAY OF MAGNESIUM: CPT

## 2023-07-18 PROCEDURE — 93005 ELECTROCARDIOGRAM TRACING: CPT

## 2023-07-18 PROCEDURE — 85025 COMPLETE CBC W/AUTO DIFF WBC: CPT

## 2023-07-18 PROCEDURE — 99223 1ST HOSP IP/OBS HIGH 75: CPT | Performed by: INTERNAL MEDICINE

## 2023-07-18 PROCEDURE — 80053 COMPREHEN METABOLIC PANEL: CPT

## 2023-07-18 PROCEDURE — 84484 ASSAY OF TROPONIN QUANT: CPT

## 2023-07-18 PROCEDURE — 71045 X-RAY EXAM CHEST 1 VIEW: CPT

## 2023-07-18 PROCEDURE — 36415 COLL VENOUS BLD VENIPUNCTURE: CPT

## 2023-07-18 RX ORDER — AMLODIPINE BESYLATE 5 MG/1
5 TABLET ORAL DAILY
Status: DISCONTINUED | OUTPATIENT
Start: 2023-07-19 | End: 2023-07-19 | Stop reason: HOSPADM

## 2023-07-18 RX ORDER — ATORVASTATIN CALCIUM 40 MG/1
40 TABLET, FILM COATED ORAL
Status: DISCONTINUED | OUTPATIENT
Start: 2023-07-18 | End: 2023-07-19 | Stop reason: HOSPADM

## 2023-07-18 RX ORDER — NITROGLYCERIN 0.4 MG/1
0.4 TABLET SUBLINGUAL
Status: DISCONTINUED | OUTPATIENT
Start: 2023-07-18 | End: 2023-07-19 | Stop reason: HOSPADM

## 2023-07-18 RX ORDER — CALCIUM GLUCONATE 20 MG/ML
1 INJECTION, SOLUTION INTRAVENOUS ONCE
Status: DISCONTINUED | OUTPATIENT
Start: 2023-07-18 | End: 2023-07-18

## 2023-07-18 RX ORDER — MAGNESIUM SULFATE 1 G/100ML
1 INJECTION INTRAVENOUS ONCE
Status: COMPLETED | OUTPATIENT
Start: 2023-07-18 | End: 2023-07-18

## 2023-07-18 RX ORDER — ASPIRIN 325 MG
325 TABLET ORAL ONCE
Status: COMPLETED | OUTPATIENT
Start: 2023-07-18 | End: 2023-07-18

## 2023-07-18 RX ORDER — FLUOXETINE HYDROCHLORIDE 20 MG/1
40 CAPSULE ORAL DAILY
Status: DISCONTINUED | OUTPATIENT
Start: 2023-07-19 | End: 2023-07-19 | Stop reason: HOSPADM

## 2023-07-18 RX ORDER — NITROGLYCERIN 0.4 MG/1
0.4 TABLET SUBLINGUAL ONCE
Status: COMPLETED | OUTPATIENT
Start: 2023-07-18 | End: 2023-07-18

## 2023-07-18 RX ORDER — ACETAMINOPHEN 325 MG/1
650 TABLET ORAL ONCE
Status: COMPLETED | OUTPATIENT
Start: 2023-07-18 | End: 2023-07-18

## 2023-07-18 RX ORDER — POTASSIUM CHLORIDE 14.9 MG/ML
20 INJECTION INTRAVENOUS ONCE
Status: DISCONTINUED | OUTPATIENT
Start: 2023-07-18 | End: 2023-07-18

## 2023-07-18 RX ORDER — TRAZODONE HYDROCHLORIDE 50 MG/1
50 TABLET ORAL
Status: DISCONTINUED | OUTPATIENT
Start: 2023-07-18 | End: 2023-07-19 | Stop reason: HOSPADM

## 2023-07-18 RX ORDER — ENOXAPARIN SODIUM 100 MG/ML
40 INJECTION SUBCUTANEOUS DAILY
Status: DISCONTINUED | OUTPATIENT
Start: 2023-07-19 | End: 2023-07-19 | Stop reason: HOSPADM

## 2023-07-18 RX ORDER — LOSARTAN POTASSIUM 50 MG/1
50 TABLET ORAL DAILY
Status: DISCONTINUED | OUTPATIENT
Start: 2023-07-19 | End: 2023-07-19 | Stop reason: HOSPADM

## 2023-07-18 RX ORDER — POTASSIUM CHLORIDE 20 MEQ/1
40 TABLET, EXTENDED RELEASE ORAL ONCE
Status: DISCONTINUED | OUTPATIENT
Start: 2023-07-18 | End: 2023-07-18

## 2023-07-18 RX ORDER — ACETAMINOPHEN 325 MG/1
650 TABLET ORAL EVERY 8 HOURS PRN
Status: DISCONTINUED | OUTPATIENT
Start: 2023-07-18 | End: 2023-07-19 | Stop reason: HOSPADM

## 2023-07-18 RX ADMIN — ATORVASTATIN CALCIUM 40 MG: 40 TABLET, FILM COATED ORAL at 20:08

## 2023-07-18 RX ADMIN — ACETAMINOPHEN 650 MG: 325 TABLET, FILM COATED ORAL at 11:45

## 2023-07-18 RX ADMIN — ASPIRIN 325 MG ORAL TABLET 325 MG: 325 PILL ORAL at 11:45

## 2023-07-18 RX ADMIN — MAGNESIUM SULFATE HEPTAHYDRATE 1 G: 1 INJECTION, SOLUTION INTRAVENOUS at 12:16

## 2023-07-18 RX ADMIN — TRAZODONE HYDROCHLORIDE 50 MG: 50 TABLET ORAL at 21:23

## 2023-07-18 RX ADMIN — NITROGLYCERIN 0.4 MG: 0.4 TABLET SUBLINGUAL at 11:25

## 2023-07-18 NOTE — ED NOTES
Pt c/o chest pain to this RN that started 5 minutes ago. EKG performed, vitals WNL.  EKG shown to provider     Jaime Ray RN  07/18/23 1402

## 2023-07-18 NOTE — ED ATTENDING ATTESTATION
7/18/2023  I, Claudell Parkin, MD, saw and evaluated the patient. I have discussed the patient with the resident/non-physician practitioner and agree with the resident's/non-physician practitioner's findings, Plan of Care, and MDM as documented in the resident's/non-physician practitioner's note, except where noted. All available labs and Radiology studies were reviewed. I was present for key portions of any procedure(s) performed by the resident/non-physician practitioner and I was immediately available to provide assistance. At this point I agree with the current assessment done in the Emergency Department. I have conducted an independent evaluation of this patient a history and physical is as follows: Patient presents to the emergency department complaint of chest pain this morning. It was substernal in nature with radiation to left arm. He had associated nausea diaphoresis and shortness of breath. Patient has a history of cardiac stents about 5 years ago. He has been chest pain-free since that time until approximately 1 year ago when he started having intermittent episodes of chest pain with exertion. Patient is awake and alert. Heart is regular rate and rhythm, lungs are clear to auscultation bilaterally, abdomen is soft and nontender. EKG was reviewed by me and shows normal sinus rhythm without significant ischemic changes. Patient had nitroglycerin while in the emergency department x2. His pain was relieved with nitroglycerin. Of note his lab work initially showed several abnormal electrolytes. Testing was repeated with normalization thus suspecting lab specimen error. Troponin initial was part of this draw therefore repeat set of troponins were ordered to ensure no dilutional effect. ED Course  ED Course as of 07/18/23 1457   Tue Jul 18, 2023   1238 Labs were from a direct draw per nurse. Were not drawn off of an existing line.    Roxanne prior to correction as they were drawn off of an IV line. With multiple electrolyte abnormalities want to ensure that this was not due to a diluted specimen. 1415 Repeat lab shows normal K and normal Ca. Suspect initial test was possibly dilute specimen. 1455 Patient did have relief of CP with ntg.           Critical Care Time  Procedures

## 2023-07-18 NOTE — ASSESSMENT & PLAN NOTE
Patient reports episode of 7 out of 10 chest pain that was associated with nausea, diaphoresis, diarrhea, left arm pain, lightheadedness, and headache. States episode similar to an episode 5 years ago where he needed to receive a stent placement. Patient was given 2 nitro tabs in the ED. Troponins are negative. EKG revealed normal sinus rhythm. QUYNH score was a 5.    Unstable angina vs Stable angina   - stress test order placed  - if positive stress test, plan to consult cardiology  - Nitroglycerin sublingual prn  - Aspirin 81mg daily  - oxygen prn for O2 < 90%

## 2023-07-18 NOTE — ED NOTES
Per provider, will hold off on medications until new CMP results     Mikayla Villalobos RN  07/18/23 7661

## 2023-07-18 NOTE — ASSESSMENT & PLAN NOTE
Patient has a reported history of anxiety, major depression and PTSD.    - continue Prozac  - continue trazodone at night

## 2023-07-18 NOTE — H&P
[address] [facility]  H&P  Name: Rosy Mondragon 70 y.o. male I MRN: [id number]  Unit/Bed#: [facility]INA I Date of Admission: 7/18/2023   Date of Service: 7/18/2023 I Hospital Day: 0      Assessment/Plan   * Chest pain  Assessment & Plan  Patient reports episode of 7 out of 10 chest pain that was associated with nausea, diaphoresis, diarrhea, left arm pain, lightheadedness, and headache. States episode similar to an episode 5 years ago where he needed to receive a stent placement. Patient was given 2 nitro tabs in the ED. Troponins are negative. EKG revealed normal sinus rhythm. QUYNH score was a 5. Unstable angina vs Stable angina   - stress test order placed  - if positive stress test, plan to consult cardiology  - Nitroglycerin sublingual prn  - Aspirin 81mg daily  - oxygen prn for O2 < 90%    Anxiety  Assessment & Plan  Patient has a reported history of anxiety, major depression and PTSD. - continue Prozac  - continue trazodone at night    Hyperlipidemia  Assessment & Plan  - continue statin therapy    Hypertension  Assessment & Plan  - continue current home regimen of Amlodipine and Losartan        VTE Pharmacologic Prophylaxis: VTE Score: 5 High Risk (Score >/= 5) - Pharmacological DVT Prophylaxis Ordered: enoxaparin (Lovenox). Sequential Compression Devices Ordered. Code Status: Prior Level 1  Discussion with family: Patient declined call to . Anticipated Length of Stay: Patient will be admitted on an observation basis with an anticipated length of stay of less than 2 midnights secondary to chest pain.     Total Time Spent on Date of Encounter in care of patient: 55 minutes This time was spent on one or more of the following: performing physical exam; counseling and coordination of care; obtaining or reviewing history; documenting in the medical record; reviewing/ordering tests, medications or procedures; communicating with other healthcare professionals and discussing with patient's family/caregivers. Chief Complaint: Chest Pain    History of Present Illness:  Aviva Naranjo is a 70 y.o. male with a PMH of hypertension, anxiety/depression, MI, and hyperlipidemia who presents with 7 out of 10 chest pain. Patient states that this morning while he was showering he started to exhibit chest pain with associated nausea, diaphoresis, diarrhea, left arm pain, lightheadedness, and headache. He took an aspirin and an antacid at home hoping that it would resolve the pain, but it did not help. Patient reports that this is similar to an episode that he experienced about 5 years ago where he subsequently needed to undergo cardiac catheterization and stent placement. He states that he has experienced on and off chest pain for the past few years and as well as increasing shortness of breath if he was exerting himself. However most of these episodes would resolve on their own. After today's episode, patient reported that he needed to go to the emergency department and thus his wife drove him to the [facility] ED. While in the emergency department he received sublingual nitroglycerin, 325mg of aspirin, and was ordered a chest x-ray, EKG, CBC, BMP, troponins, and magnesium. Most recent troponin levels were 5. Initial EKG revealed normal sinus rhythm. Patient reports that the nitroglycerin helped resolve his chest pain, and now he just feels more of a discomfort and pressure in his chest.  Also states that he is still experiencing left arm pain and a headache. Vital signs have remained stable. Patient was admitted to medicine service for further work-up and evaluation. Review of Systems:  Review of Systems   Constitutional: Positive for diaphoresis. Negative for activity change and chills. HENT: Negative. Eyes: Negative. Respiratory: Positive for chest tightness and shortness of breath. Cardiovascular: Positive for chest pain.    Gastrointestinal: Positive for diarrhea and nausea. Genitourinary: Negative. Musculoskeletal: Positive for arthralgias. Skin: Negative. Neurological: Positive for dizziness, light-headedness and headaches. Psychiatric/Behavioral: The patient is nervous/anxious. Past Medical and Surgical History:   Past Medical History:   Diagnosis Date   • Cancer ([address])     Skin   • Concussion    • Heart attack ([address])    • Hyperlipidemia    • Hypertension        Past Surgical History:   Procedure Laterality Date   • CORONARY ANGIOPLASTY WITH STENT PLACEMENT     • HAND SURGERY      Trigger finger x 3   • KNEE SURGERY Bilateral     Scope       Meds/Allergies:  Prior to Admission medications    Medication Sig Start Date End Date Taking? Authorizing Provider   amLODIPine (NORVASC) 5 mg tablet Take 5 mg by mouth daily 7/23/20  Yes Historical Provider, MD   aspirin 81 MG tablet Take 81 mg by mouth daily    Yes Historical Provider, MD   FLUoxetine (PROzac) 40 MG capsule Take 1 capsule (40 mg total) by mouth daily 2/10/23  Yes Rosmery Casillasash III, DO   losartan (COZAAR) 50 mg tablet Take 50 mg by mouth daily   Yes Historical Provider, MD   nitroglycerin (NITROSTAT) 0.4 mg SL tablet Place 1 tablet (0.4 mg total) under the tongue every 5 (five) minutes as needed for chest pain 7/3/18  Yes Dona Weller MD   rosuvastatin (CRESTOR) 20 MG tablet Take 20 mg by mouth daily at bedtime 9/23/21  Yes Historical Provider, MD   traZODone (DESYREL) 50 mg tablet TAKE 0.5-1 TABLETS (25-50 MG TOTAL) BY MOUTH DAILY AT BEDTIME . PLEASE CALL FOR APPOINTMENT IN ORDER TO GET 90 DAY PRESCRIPTION 3/13/23  Yes Constance Clarke MD   FLUoxetine (PROzac) 10 mg capsule TAKE 10MG. THIS IN ADDITION TO 40MG (50MG TOTAL)  Patient not taking: Reported on 7/18/2023 3/13/23   Constance Clarke MD     I have reviewed home medications with patient personally. Allergies:    Allergies   Allergen Reactions   • Codeine    • Hydrocodone Nausea Only   • Other      Annotation - [id number]: anesthesia - took a long time to wake up and was very nauseated and with vomiting   • Oxycodone GI Intolerance, Nausea Only and Abdominal Pain   • Prednisone Other (See Comments)     Other reaction(s): Depression, mood swings, anger  psychosis       Social History:  Marital Status: /Civil Union   Occupation: n/a  Patient Pre-hospital Living Situation: Home, With spouse  Patient Pre-hospital Level of Mobility: walks  Patient Pre-hospital Diet Restrictions: Vegetarian  Substance Use History:   Social History     Substance and Sexual Activity   Alcohol Use Yes    Comment: 1 bottle of wine a week     Social History     Tobacco Use   Smoking Status Never   Smokeless Tobacco Never     Social History     Substance and Sexual Activity   Drug Use No       Family History:  Family History   Problem Relation Age of Onset   • Congenital heart disease Mother    • Breast cancer Mother    • Stomach cancer Father    • Heart attack Paternal Grandmother        Physical Exam:     Vitals:   Blood Pressure: 146/83 (07/18/23 1445)  Pulse: 60 (07/18/23 1445)  Temperature: 97.7 °F (36.5 °C) (07/18/23 1115)  Respirations: 16 (07/18/23 1445)  SpO2: 98 % (07/18/23 1445)    Physical Exam  Constitutional:       Appearance: Normal appearance. HENT:      Head: Normocephalic and atraumatic. Right Ear: External ear normal.      Left Ear: External ear normal.      Nose: Nose normal.      Mouth/Throat:      Mouth: Mucous membranes are moist.      Pharynx: Oropharynx is clear. Eyes:      Extraocular Movements: Extraocular movements intact. Conjunctiva/sclera: Conjunctivae normal.   Cardiovascular:      Rate and Rhythm: Normal rate and regular rhythm. Heart sounds: No murmur heard. Pulmonary:      Effort: Pulmonary effort is normal.      Breath sounds: Normal breath sounds. Abdominal:      General: There is no distension. Palpations: Abdomen is soft. Tenderness: There is no abdominal tenderness.    Musculoskeletal:         General: No swelling or tenderness. Cervical back: Neck supple. Comments: 5/5 strength in bilateral upper and lower extremities     Skin:     General: Skin is warm and dry. Neurological:      Mental Status: He is alert and oriented to person, place, and time. Additional Data:     Lab Results:  Results from last 7 days   Lab Units 07/18/23  1124   WBC Thousand/uL 8.05   HEMOGLOBIN g/dL 14.4   HEMATOCRIT % 42.1   PLATELETS Thousands/uL 160   NEUTROS PCT % 65   LYMPHS PCT % 18   MONOS PCT % 10   EOS PCT % 5     Results from last 7 days   Lab Units 07/18/23  1318   SODIUM mmol/L 135   POTASSIUM mmol/L 4.9   CHLORIDE mmol/L 106   CO2 mmol/L 26   BUN mg/dL 10   CREATININE mg/dL 0.75   ANION GAP mmol/L 3   CALCIUM mg/dL 8.9   ALBUMIN g/dL 3.7   TOTAL BILIRUBIN mg/dL 0.68   ALK PHOS U/L 104   ALT U/L 13   AST U/L 23   GLUCOSE RANDOM mg/dL 85                       Lines/Drains:  Invasive Devices     Peripheral Intravenous Line  Duration           Peripheral IV 07/18/23 Right Antecubital <1 day    Peripheral IV 07/18/23 Right Wrist <1 day                    Imaging: Reviewed radiology reports from this admission including: chest xray  XR chest 1 view portable   ED Interpretation by Vincent Harley DO (07/18 5652)   No acute cardiopulmonary disease per my interpretation. EKG and Other Studies Reviewed on Admission:   · EKG: NSR. HR 60.    ** Please Note: This note has been constructed using a voice recognition system.  **

## 2023-07-18 NOTE — ED PROVIDER NOTES
History  Chief Complaint   Patient presents with   • Chest Pain     X1 hour. H/o MI     The patient is a 57-year-old male with past medical history of hypertension, hyperlipidemia and MI currently taking aspirin who presents to the emergency department with complaint of chest pain. He reports his chest pain is substernal and radiates into the left chest, left shoulder and down his left arm. He also endorsed shortness of breath, nausea, lightheadedness and diaphoresis. He states the pain also radiated into his epigastric region and initial pain was 10 out of 10. He states that his pain is now a 5 out of 10 and says that it feels similar to his previous heart attack. He reported that the pain started while at rest.  He denies change in vision, vomiting, diarrhea, numbness, tingling, weakness, rash or fever. Prior to Admission Medications   Prescriptions Last Dose Informant Patient Reported? Taking? FLUoxetine (PROzac) 10 mg capsule   No No   Sig: TAKE 10MG. THIS IN ADDITION TO 40MG (50MG TOTAL)   FLUoxetine (PROzac) 40 MG capsule   No No   Sig: Take 1 capsule (40 mg total) by mouth daily   amLODIPine (NORVASC) 5 mg tablet   Yes No   Sig: Take 5 mg by mouth daily   aspirin 81 MG tablet   Yes No   Sig: Take 81 mg by mouth daily    losartan (COZAAR) 50 mg tablet   Yes No   Sig: Take 50 mg by mouth daily   nitroglycerin (NITROSTAT) 0.4 mg SL tablet   No No   Sig: Place 1 tablet (0.4 mg total) under the tongue every 5 (five) minutes as needed for chest pain   rosuvastatin (CRESTOR) 20 MG tablet   Yes No   Sig: Take 20 mg by mouth daily at bedtime   traZODone (DESYREL) 50 mg tablet   No No   Sig: TAKE 0.5-1 TABLETS (25-50 MG TOTAL) BY MOUTH DAILY AT BEDTIME .  PLEASE CALL FOR APPOINTMENT IN ORDER TO GET 80 DAY PRESCRIPTION      Facility-Administered Medications: None       Past Medical History:   Diagnosis Date   • Cancer [facility])     Skin   • Concussion    • Heart attack ([address])    • Hyperlipidemia    • Hypertension        Past Surgical History:   Procedure Laterality Date   • CORONARY ANGIOPLASTY WITH STENT PLACEMENT     • HAND SURGERY      Trigger finger x 3   • KNEE SURGERY Bilateral     Scope       Family History   Problem Relation Age of Onset   • Congenital heart disease Mother    • Breast cancer Mother    • Stomach cancer Father    • Heart attack Paternal Grandmother      I have reviewed and agree with the history as documented. E-Cigarette/Vaping     E-Cigarette/Vaping Substances     Social History     Tobacco Use   • Smoking status: Never   • Smokeless tobacco: Never   Substance Use Topics   • Alcohol use: Yes     Comment: 1 bottle of wine a week   • Drug use: No        Review of Systems   Constitutional: Positive for diaphoresis. Negative for chills, fatigue and fever. HENT: Negative for congestion, ear pain and sore throat. Eyes: Negative for photophobia, pain and visual disturbance. Respiratory: Positive for shortness of breath. Negative for cough, wheezing and stridor. Cardiovascular: Positive for chest pain. Negative for palpitations and leg swelling. Gastrointestinal: Positive for nausea. Negative for abdominal distention, abdominal pain, constipation, diarrhea and vomiting. Endocrine: Negative. Genitourinary: Negative for dysuria and hematuria. Musculoskeletal: Negative for arthralgias, back pain, neck pain and neck stiffness. Skin: Negative for color change and rash. Allergic/Immunologic: Negative. Neurological: Positive for light-headedness. Negative for dizziness, seizures, syncope, weakness, numbness and headaches. Hematological: Negative. Psychiatric/Behavioral: Negative. All other systems reviewed and are negative.       Physical Exam  ED Triage Vitals   Temperature Pulse Respirations Blood Pressure SpO2   07/18/23 1115 07/18/23 1058 07/18/23 1058 07/18/23 1058 07/18/23 1058   97.7 °F (36.5 °C) 71 18 165/95 97 %      Temp src Heart Rate Source Patient Position - Orthostatic VS BP Location FiO2 (%)   -- 07/18/23 1058 07/18/23 1058 07/18/23 1058 --    Monitor Sitting Right arm       Pain Score       07/18/23 1058       9             Orthostatic Vital Signs  Vitals:    07/18/23 1200 07/18/23 1230 07/18/23 1442 07/18/23 1445   BP: 140/82 129/78 146/83 146/83   Pulse: 66 64 60 60   Patient Position - Orthostatic VS: Sitting Sitting Sitting Sitting       Physical Exam  Vitals and nursing note reviewed. Constitutional:       General: He is not in acute distress. Appearance: He is well-developed and normal weight. He is ill-appearing. HENT:      Head: Normocephalic and atraumatic. Eyes:      Extraocular Movements: Extraocular movements intact. Conjunctiva/sclera: Conjunctivae normal.      Pupils: Pupils are equal, round, and reactive to light. Neck:      Thyroid: No thyromegaly. Vascular: No JVD. Cardiovascular:      Rate and Rhythm: Normal rate and regular rhythm. Pulses:           Radial pulses are 2+ on the right side and 2+ on the left side. Dorsalis pedis pulses are 2+ on the right side and 2+ on the left side. Heart sounds: Normal heart sounds. No murmur heard. No friction rub. Pulmonary:      Effort: Pulmonary effort is normal. No respiratory distress. Breath sounds: Normal breath sounds. No stridor. No decreased breath sounds, wheezing, rhonchi or rales. Chest:      Chest wall: No mass, tenderness, crepitus or edema. Abdominal:      General: Bowel sounds are normal.      Palpations: Abdomen is soft. There is no hepatomegaly, splenomegaly or mass. Tenderness: There is no abdominal tenderness. There is no guarding or rebound. Musculoskeletal:         General: No swelling. Cervical back: Normal range of motion and neck supple. Right lower leg: No tenderness. No edema. Left lower leg: No tenderness. No edema. Lymphadenopathy:      Cervical: No cervical adenopathy.    Skin:     General: Skin is warm and dry. Capillary Refill: Capillary refill takes less than 2 seconds. Coloration: Skin is not cyanotic. Findings: No erythema or rash. Neurological:      General: No focal deficit present. Mental Status: He is alert. Cranial Nerves: No cranial nerve deficit. Motor: No weakness.    Psychiatric:         Mood and Affect: Mood normal.         ED Medications  Medications   nitroglycerin (NITROSTAT) SL tablet 0.4 mg (0.4 mg Sublingual Given 7/18/23 1125)   acetaminophen (TYLENOL) tablet 650 mg (650 mg Oral Given 7/18/23 1145)   aspirin tablet 325 mg (325 mg Oral Given 7/18/23 1145)   magnesium sulfate IVPB (premix) SOLN 1 g (0 g Intravenous Stopped 7/18/23 1316)       Diagnostic Studies  Results Reviewed     Procedure Component Value Units Date/Time    HS Troponin 0hr (reflex protocol) [[id number]]  (Normal) Collected: 07/18/23 1442    Lab Status: Final result Specimen: Blood from Arm, Right Updated: 07/18/23 1521     hs TnI 0hr 4 ng/L     HS Troponin I 2hr [[id number]]     Lab Status: No result Specimen: Blood     Magnesium [[id number]]  (Normal) Collected: 07/18/23 1318    Lab Status: Final result Specimen: Blood from Hand, Right Updated: 07/18/23 1445     Magnesium 2.6 mg/dL     Comprehensive metabolic panel [[id number]] Collected: 07/18/23 1318    Lab Status: Final result Specimen: Blood from Hand, Right Updated: 07/18/23 1355     Sodium 135 mmol/L      Potassium 4.9 mmol/L      Chloride 106 mmol/L      CO2 26 mmol/L      ANION GAP 3 mmol/L      BUN 10 mg/dL      Creatinine 0.75 mg/dL      Glucose 85 mg/dL      Calcium 8.9 mg/dL      AST 23 U/L      ALT 13 U/L      Alkaline Phosphatase 104 U/L      Total Protein 6.6 g/dL      Albumin 3.7 g/dL      Total Bilirubin 0.68 mg/dL      eGFR 89 ml/min/1.73sq m     Narrative:      [place] guidelines for Chronic Kidney Disease (CKD):   •  Stage 1 with normal or high GFR (GFR > 90 mL/min/1.73 square meters)  • Stage 2 Mild CKD (GFR = 60-89 mL/min/1.73 square meters)  •  Stage 3A Moderate CKD (GFR = 45-59 mL/min/1.73 square meters)  •  Stage 3B Moderate CKD (GFR = 30-44 mL/min/1.73 square meters)  •  Stage 4 Severe CKD (GFR = 15-29 mL/min/1.73 square meters)  •  Stage 5 End Stage CKD (GFR <15 mL/min/1.73 square meters)  Note: GFR calculation is accurate only with a steady state creatinine    HS Troponin 0hr (reflex protocol) [[id number]]  (Normal) Collected: 07/18/23 1124    Lab Status: Final result Specimen: Blood from Arm, Right Updated: 07/18/23 1323     hs TnI 0hr 5 ng/L     Comprehensive metabolic panel [[id number]]  (Abnormal) Collected: 07/18/23 1124    Lab Status: Final result Specimen: Blood from Arm, Right Updated: 07/18/23 1213     Sodium 142 mmol/L      Potassium 2.3 mmol/L      Chloride 121 mmol/L      CO2 21 mmol/L      ANION GAP 0 mmol/L      BUN 7 mg/dL      Creatinine 0.49 mg/dL      Glucose 51 mg/dL      Calcium 5.6 mg/dL      Corrected Calcium 6.8 mg/dL      AST 9 U/L      ALT 9 U/L      Alkaline Phosphatase 73 U/L      Total Protein 4.2 g/dL      Albumin 2.5 g/dL      Total Bilirubin 0.47 mg/dL      eGFR 106 ml/min/1.73sq m     Narrative:      [facility] guidelines for Chronic Kid[facility] Disease (CKD):   •  Stage 1 with normal or high GFR (GFR > 90 mL/min/1.73 square meters)  •  Stage 2 Mild CKD (GFR = 60-89 mL/min/1.73 square meters)  •  Stage 3A Moderate CKD (GFR = 45-59 mL/min/1.73 square meters)  •  Stage 3B Moderate CKD (GFR = 30-44 mL/min/1.73 square meters)  •  Stage 4 Severe CKD (GFR = 15-29 mL/min/1.73 square meters)  •  Stage 5 End Stage CKD (GFR <15 mL/min/1.73 square meters)  Note: GFR calculation is accurate only with a steady state creatinine    Magnesium [[id number]]  (Abnormal) Collected: 07/18/23 1124    Lab Status: Final result Specimen: Blood from Arm, Right Updated: 07/18/23 1153     Magnesium 1.3 mg/dL     CBC and differential [[id number]] Collected: 07/18/23 1120 Lab Status: Final result Specimen: Blood from Arm, Right Updated: 07/18/23 1136     WBC 8.05 Thousand/uL      RBC 4.38 Million/uL      Hemoglobin 14.4 g/dL      Hematocrit 42.1 %      MCV 96 fL      MCH 32.9 pg      MCHC 34.2 g/dL      RDW 12.0 %      MPV 9.4 fL      Platelets 307 Thousands/uL      nRBC 0 /100 WBCs      Neutrophils Relative 65 %      Immat GRANS % 1 %      Lymphocytes Relative 18 %      Monocytes Relative 10 %      Eosinophils Relative 5 %      Basophils Relative 1 %      Neutrophils Absolute 5.36 Thousands/µL      Immature Grans Absolute 0.04 Thousand/uL      Lymphocytes Absolute 1.46 Thousands/µL      Monocytes Absolute 0.77 Thousand/µL      Eosinophils Absolute 0.37 Thousand/µL      Basophils Absolute 0.05 Thousands/µL                  XR chest 1 view portable   ED Interpretation by Alla Lewis DO (07/18 [id number])   No acute cardiopulmonary disease per my interpretation. Procedures  ECG 12 Lead Documentation Only    Date/Time: 7/18/2023 11:00 AM    Performed by: Alla Lewis DO  Authorized by: Alla Lewis DO    Indications / Diagnosis:  Chest pain  ECG reviewed by me, the ED Provider: yes    Patient location:  ED  Previous ECG:     Previous ECG:  Compared to current    Similarity:  Changes noted    Comparison to cardiac monitor: No    Interpretation:     Interpretation: normal    Quality:     Tracing quality:  Limited by artifact  Rate:     ECG rate:  70    ECG rate assessment: normal    Rhythm:     Rhythm: sinus rhythm    Ectopy:     Ectopy: none    QRS:     QRS axis:  Normal    QRS intervals:  Normal  Conduction:     Conduction: normal    ST segments:     ST segments:  Normal  T waves:     T waves: normal    Comments:      Normal sinus rhythm and T wave inversions in lead III appear to have resolved from previous tracing. No evidence of acute ischemia or STEMI. Patient reports chest pain and shortness of breath. We are assessing troponin.     ECG 12 Lead Documentation Only    Date/Time: 7/18/2023 1:56 PM    Performed by: Angela Noel DO  Authorized by: Angela Noel DO    Indications / Diagnosis:  Chest pain  ECG reviewed by me, the ED Provider: yes    Patient location:  ED  Previous ECG:     Previous ECG:  Compared to current    Similarity:  No change    Comparison to cardiac monitor: No    Interpretation:     Interpretation: normal    Rate:     ECG rate:  60    ECG rate assessment: normal    Rhythm:     Rhythm: sinus rhythm    Ectopy:     Ectopy: none    QRS:     QRS axis:  Normal    QRS intervals:  Normal  Conduction:     Conduction: normal    ST segments:     ST segments:  Normal  T waves:     T waves: normal    Comments:      Normal sinus rhythm. Unchanged from previous tracing. Patient reported another bout of chest pain. We are assessing troponin. ED Course  ED Course as of 07/18/23 1559   Tue Jul 18, 2023   1200 ECG 12 lead  Normal sinus rhythm and T wave inversions in lead III appear to have resolved from previous tracing. No evidence of acute ischemia or STEMI. Patient reports chest pain and shortness of breath. We are assessing troponin. 1200 Magnesium(!): 1.3  We will order IV magnesium at this time. 1215 Potassium(!!): 2.3  Will replete   1242 Calcium(!!): 5.6  We will order IV calcium at this time. 1418 With high clinical suspicion that initial blood work was diluted. We are going to repeat a 0, 2-hour and 4-hour troponin. 1420 CBC and differential  Within normal limits   1420 Comprehensive metabolic panel  Within normal limits   1447 Magnesium: 2.6  Within normal limits   1449 ECG 12 lead  Normal sinus rhythm. Unchanged from previous tracing. Patient reported another bout of chest pain. We are assessing troponin. 1454 XR chest 1 view portable  No acute cardiopulmonary disease per my interpretation. 1558 I spoke with Dr. Sri Emmanuel who agreed to admission as observation with telemetry at this time. HEART score:    History 2=Highly suspicious   ECG 0=Normal   Age 2= > 65 years   Risk Factors 2= > 3 risk factors, or history of atherosclerotic disease   Troponin 0= Less than or equal to 12 ng/L   Total 6            HEART Risk Score    Flowsheet Row Most Recent Value   Heart Score Risk Calculator    History 0 Filed at: 07/18/2023 1136   ECG 0 Filed at: 07/18/2023 1136   Age 2 Filed at: 07/18/2023 1136   Risk Factors 1 Filed at: 07/18/2023 1136   Troponin 0 Filed at: 07/18/2023 1136   HEART Score 3 Filed at: 07/18/2023 1136                      SBIRT 22yo+    Flowsheet Row Most Recent Value   Initial Alcohol Screen: US AUDIT-C     1. How often do you have a drink containing alcohol? 0 Filed at: 07/18/2023 1116   2. How many drinks containing alcohol do you have on a typical day you are drinking? 0 Filed at: 07/18/2023 1116   3b. FEMALE Any Age, or MALE 65+: How often do you have 4 or more drinks on one occassion? 0 Filed at: 07/18/2023 1116   Audit-C Score 0 Filed at: 07/18/2023 1116   ADRIEN: How many times in the past year have you. .. Used an illegal drug or used a prescription medication for non-medical reasons? Never Filed at: 07/18/2023 1116                Medical Decision Making  The patient is a 82-year-old male with past medical history of hypertension, hyperlipidemia and MI currently taking aspirin who presents to the emergency department with complaint of chest pain. Upon initial presentation the patient was alert and oriented x4 and in no acute distress. Upon physical exam the patient was noted to be slightly hypertensive at 165/95 on the monitor but the remainder of his physical exam was grossly unremarkable. The differential includes but is not limited to ACS, pneumothorax, GERD or anxiety. I have ordered a CBC, CMP, troponin, magnesium, EKG, chest x-ray as well as 1 sublingual nitro and 324 mg of aspirin. Results pending. Amount and/or Complexity of Data Reviewed  Labs: ordered.   Radiology: ordered. Risk  OTC drugs. Prescription drug management. Disposition  Final diagnoses:   ACS (acute coronary syndrome) ([address])   Unstable angina ([address])     Time reflects when diagnosis was documented in both MDM as applicable and the Disposition within this note     Time User Action Codes Description Comment    7/18/2023  3:57 PM Kannan Yung Add [I24.9] ACS (acute coronary syndrome) ([address])     7/18/2023  3:57 PM Kannan Yung Add [I20.0] Unstable angina [facility])       ED Disposition     ED Disposition   Admit    Condition   Stable    Date/Time   Tue Jul 18, 2023  3:57 PM    Comment   Case was discussed with Dr. Danya Fraga and the patient's admission status was agreed to be Admission Status: observation status to the service of Dr. Danya Fraga . Follow-up Information    None         Patient's Medications   Discharge Prescriptions    No medications on file     No discharge procedures on file. PDMP Review     None           ED Provider  Attending physically available and evaluated Zehra Jeffery. I managed the patient along with the ED Attending.     Electronically Signed by         Lui Morfin DO  07/18/23 8334

## 2023-07-19 ENCOUNTER — APPOINTMENT (OUTPATIENT)
Dept: NON INVASIVE DIAGNOSTICS | Facility: HOSPITAL | Age: 78
End: 2023-07-19
Payer: COMMERCIAL

## 2023-07-19 ENCOUNTER — APPOINTMENT (OUTPATIENT)
Dept: NUCLEAR MEDICINE | Facility: HOSPITAL | Age: 78
End: 2023-07-19
Payer: COMMERCIAL

## 2023-07-19 VITALS
HEART RATE: 71 BPM | RESPIRATION RATE: 16 BRPM | TEMPERATURE: 97.8 F | SYSTOLIC BLOOD PRESSURE: 152 MMHG | DIASTOLIC BLOOD PRESSURE: 75 MMHG | OXYGEN SATURATION: 98 %

## 2023-07-19 LAB
ANION GAP SERPL CALCULATED.3IONS-SCNC: 6 MMOL/L
ATRIAL RATE: 59 BPM
ATRIAL RATE: 60 BPM
ATRIAL RATE: 63 BPM
ATRIAL RATE: 70 BPM
BUN SERPL-MCNC: 9 MG/DL (ref 5–25)
CALCIUM SERPL-MCNC: 8.8 MG/DL (ref 8.4–10.2)
CARDIAC TROPONIN I PNL SERPL HS: 4 NG/L (ref 8–18)
CHLORIDE SERPL-SCNC: 108 MMOL/L (ref 96–108)
CO2 SERPL-SCNC: 27 MMOL/L (ref 21–32)
CREAT SERPL-MCNC: 0.78 MG/DL (ref 0.6–1.3)
GFR SERPL CREATININE-BSD FRML MDRD: 87 ML/MIN/1.73SQ M
GLUCOSE P FAST SERPL-MCNC: 86 MG/DL (ref 65–99)
GLUCOSE SERPL-MCNC: 86 MG/DL (ref 65–140)
MAGNESIUM SERPL-MCNC: 2.3 MG/DL (ref 1.9–2.7)
MAX DIASTOLIC BP: 80 MMHG
MAX HEART RATE: 91 BPM
MAX PREDICTED HEART RATE: 144 BPM
MAX. SYSTOLIC BP: 154 MMHG
NUC STRESS EJECTION FRACTION: 69 %
P AXIS: 33 DEGREES
P AXIS: 34 DEGREES
P AXIS: 37 DEGREES
P AXIS: 39 DEGREES
POTASSIUM SERPL-SCNC: 3.7 MMOL/L (ref 3.5–5.3)
PR INTERVAL: 184 MS
PR INTERVAL: 192 MS
PR INTERVAL: 194 MS
PR INTERVAL: 196 MS
PROTOCOL NAME: NORMAL
QRS AXIS: 17 DEGREES
QRS AXIS: 26 DEGREES
QRS AXIS: 30 DEGREES
QRS AXIS: 31 DEGREES
QRSD INTERVAL: 76 MS
QRSD INTERVAL: 78 MS
QT INTERVAL: 404 MS
QT INTERVAL: 430 MS
QT INTERVAL: 446 MS
QT INTERVAL: 450 MS
QTC INTERVAL: 436 MS
QTC INTERVAL: 440 MS
QTC INTERVAL: 441 MS
QTC INTERVAL: 450 MS
RATE PRESSURE PRODUCT: NORMAL
REASON FOR TERMINATION: NORMAL
SL CV REST NUCLEAR ISOTOPE DOSE: 11 MCI
SL CV STRESS NUCLEAR ISOTOPE DOSE: 32.6 MCI
SL CV STRESS RECOVERY BP: NORMAL MMHG
SL CV STRESS RECOVERY HR: 80 BPM
SL CV STRESS RECOVERY O2 SAT: 98 %
SODIUM SERPL-SCNC: 141 MMOL/L (ref 135–147)
STRESS ANGINA INDEX: 0
STRESS BASELINE BP: NORMAL MMHG
STRESS BASELINE HR: 67 BPM
STRESS O2 SAT REST: 96 %
STRESS PEAK HR: 91 BPM
STRESS POST O2 SAT PEAK: 98 %
STRESS POST PEAK BP: 140 MMHG
STRESS/REST PERFUSION RATIO: 1.06
T WAVE AXIS: 43 DEGREES
T WAVE AXIS: 44 DEGREES
T WAVE AXIS: 45 DEGREES
T WAVE AXIS: 53 DEGREES
TARGET HR FORMULA: NORMAL
TEST INDICATION: NORMAL
TIME IN EXERCISE PHASE: NORMAL
VENTRICULAR RATE: 59 BPM
VENTRICULAR RATE: 60 BPM
VENTRICULAR RATE: 63 BPM
VENTRICULAR RATE: 70 BPM

## 2023-07-19 PROCEDURE — 84484 ASSAY OF TROPONIN QUANT: CPT

## 2023-07-19 PROCEDURE — 93017 CV STRESS TEST TRACING ONLY: CPT

## 2023-07-19 PROCEDURE — A9502 TC99M TETROFOSMIN: HCPCS

## 2023-07-19 PROCEDURE — 93016 CV STRESS TEST SUPVJ ONLY: CPT | Performed by: INTERNAL MEDICINE

## 2023-07-19 PROCEDURE — 78452 HT MUSCLE IMAGE SPECT MULT: CPT

## 2023-07-19 PROCEDURE — 80048 BASIC METABOLIC PNL TOTAL CA: CPT | Performed by: INTERNAL MEDICINE

## 2023-07-19 PROCEDURE — 99233 SBSQ HOSP IP/OBS HIGH 50: CPT | Performed by: INTERNAL MEDICINE

## 2023-07-19 PROCEDURE — 93005 ELECTROCARDIOGRAM TRACING: CPT

## 2023-07-19 PROCEDURE — 93010 ELECTROCARDIOGRAM REPORT: CPT | Performed by: INTERNAL MEDICINE

## 2023-07-19 PROCEDURE — 78452 HT MUSCLE IMAGE SPECT MULT: CPT | Performed by: INTERNAL MEDICINE

## 2023-07-19 PROCEDURE — G1004 CDSM NDSC: HCPCS

## 2023-07-19 PROCEDURE — 93018 CV STRESS TEST I&R ONLY: CPT | Performed by: INTERNAL MEDICINE

## 2023-07-19 PROCEDURE — 99239 HOSP IP/OBS DSCHRG MGMT >30: CPT | Performed by: INTERNAL MEDICINE

## 2023-07-19 PROCEDURE — 83735 ASSAY OF MAGNESIUM: CPT | Performed by: INTERNAL MEDICINE

## 2023-07-19 RX ORDER — ALPRAZOLAM 0.25 MG/1
0.25 TABLET ORAL ONCE
Status: COMPLETED | OUTPATIENT
Start: 2023-07-19 | End: 2023-07-19

## 2023-07-19 RX ORDER — REGADENOSON 0.08 MG/ML
0.4 INJECTION, SOLUTION INTRAVENOUS ONCE
Status: COMPLETED | OUTPATIENT
Start: 2023-07-19 | End: 2023-07-19

## 2023-07-19 RX ADMIN — AMLODIPINE BESYLATE 5 MG: 5 TABLET ORAL at 10:05

## 2023-07-19 RX ADMIN — ASPIRIN 81 MG: 81 TABLET, COATED ORAL at 10:05

## 2023-07-19 RX ADMIN — ATORVASTATIN CALCIUM 40 MG: 40 TABLET, FILM COATED ORAL at 17:19

## 2023-07-19 RX ADMIN — LOSARTAN POTASSIUM 50 MG: 50 TABLET, FILM COATED ORAL at 10:05

## 2023-07-19 RX ADMIN — ALPRAZOLAM 0.25 MG: 0.25 TABLET ORAL at 17:19

## 2023-07-19 RX ADMIN — FLUOXETINE 40 MG: 20 CAPSULE ORAL at 10:05

## 2023-07-19 RX ADMIN — REGADENOSON 0.4 MG: 0.08 INJECTION, SOLUTION INTRAVENOUS at 08:33

## 2023-07-19 NOTE — PROGRESS NOTES
[id number]  Progress Note  Name: Serene Guzman  MRN: [id number]  Unit/Bed#: S -01 I Date of Admission: 7/18/2023   Date of Service: 7/19/2023 I Hospital Day: 0    Assessment/Plan   * Chest pain  Assessment & Plan  Patient reports episode of 7 out of 10 chest pain that was associated with nausea, diaphoresis, diarrhea, left arm pain, lightheadedness, and headache. States episode similar to an episode 5 years ago where he needed to receive a stent placement. Patient was given nitro tab in the ED. Troponins are negative. EKG revealed normal sinus rhythm. QUYNH score was a 5. Unstable angina vs Stable angina   - stress test performed  - if positive, will consult cardiology   - Nitroglycerin sublingual prn  - Aspirin 81mg daily  - oxygen prn for O2 < 90%    Anxiety  Assessment & Plan  Patient has a reported history of anxiety, major depression and PTSD. - continue Prozac  - continue trazodone at night    Hyperlipidemia  Assessment & Plan  - continue statin therapy    Hypertension  Assessment & Plan  - continue current home regimen of Amlodipine and Losartan            VTE Pharmacologic Prophylaxis: VTE Score: 5 High Risk (Score >/= 5) - Pharmacological DVT Prophylaxis Ordered: enoxaparin (Lovenox). Sequential Compression Devices Ordered. Patient Centered Rounds: I performed bedside rounds with nursing staff today. Discussions with Specialists or Other Care Team Provider: n/a    Education and Discussions with Family / Patient: Updated  (wife) at bedside.     Total Time Spent on Date of Encounter in care of patient: 25 minutes This time was spent on one or more of the following: performing physical exam; counseling and coordination of care; obtaining or reviewing history; documenting in the medical record; reviewing/ordering tests, medications or procedures; communicating with other healthcare professionals and discussing with patient's family/caregivers. Current Length of Stay: 0 day(s)  Current Patient Status: Observation   Certification Statement: The patient will continue to require additional inpatient hospital stay due to stress test  Discharge Plan: Anticipate discharge tomorrow to home. Code Status: Level 1 - Full Code    Subjective:   Patient seen and examined sitting up in chair this morning. He had just gotten back from stress test this morning. Reports no chest pains or pressures. Patient is not experiencing any shortness of breath and denies nausea, vomiting, diarrhea, or headache. States that he was experiencing shortness of breath during the stress test as expected. He will wait for cardiology recs. Objective:     Vitals:   Temp (24hrs), Av.6 °F (36.4 °C), Min:97.5 °F (36.4 °C), Max:97.7 °F (36.5 °C)    Temp:  [97.5 °F (36.4 °C)-97.7 °F (36.5 °C)] 97.5 °F (36.4 °C)  HR:  [58-64] 58  Resp:  [16-18] 16  BP: (129-173)/(78-96) 155/96  SpO2:  [93 %-98 %] 98 %  There is no height or weight on file to calculate BMI. Input and Output Summary (last 24 hours):   No intake or output data in the 24 hours ending 23 1215    Physical Exam:   Physical Exam  Constitutional:       Appearance: Normal appearance. HENT:      Head: Normocephalic and atraumatic. Right Ear: External ear normal.      Left Ear: External ear normal.      Nose: Nose normal.      Mouth/Throat:      Mouth: Mucous membranes are moist.      Pharynx: Oropharynx is clear. Eyes:      Extraocular Movements: Extraocular movements intact. Conjunctiva/sclera: Conjunctivae normal.   Cardiovascular:      Rate and Rhythm: Normal rate and regular rhythm. Heart sounds: No murmur heard. Pulmonary:      Effort: Pulmonary effort is normal.      Breath sounds: Normal breath sounds. Abdominal:      General: There is no distension. Palpations: Abdomen is soft. Tenderness: There is no abdominal tenderness.    Musculoskeletal:         General: No swelling or tenderness. Cervical back: Neck supple. Skin:     General: Skin is warm and dry. Neurological:      Mental Status: He is alert and oriented to person, place, and time. Psychiatric:         Mood and Affect: Mood normal.          Additional Data:     Labs:  Results from last 7 days   Lab Units 07/18/23  1124   WBC Thousand/uL 8.05   HEMOGLOBIN g/dL 14.4   HEMATOCRIT % 42.1   PLATELETS Thousands/uL 160   NEUTROS PCT % 65   LYMPHS PCT % 18   MONOS PCT % 10   EOS PCT % 5     Results from last 7 days   Lab Units 07/19/23  0448 07/18/23  1318   SODIUM mmol/L 141 135   POTASSIUM mmol/L 3.7 4.9   CHLORIDE mmol/L 108 106   CO2 mmol/L 27 26   BUN mg/dL 9 10   CREATININE mg/dL 0.78 0.75   ANION GAP mmol/L 6 3   CALCIUM mg/dL 8.8 8.9   ALBUMIN g/dL  --  3.7   TOTAL BILIRUBIN mg/dL  --  0.68   ALK PHOS U/L  --  104   ALT U/L  --  13   AST U/L  --  23   GLUCOSE RANDOM mg/dL 86 85                       Lines/Drains:  Invasive Devices     Peripheral Intravenous Line  Duration           Peripheral IV 07/18/23 Right Antecubital 1 day    Peripheral IV 07/18/23 Right Wrist 1 day                  Telemetry:  Telemetry Orders (From admission, onward)             24 Hour Telemetry Monitoring  Continuous x 24 Hours (Telem)        Question:  Reason for 24 Hour Telemetry  Answer:  PCI/EP study (including pacer and ICD implementation), Cardiac surgery, MI, abnormal cardiac cath, and chest pain- rule out MI                 Telemetry Reviewed: 2 episodes of vtach recorded   Indication for Continued Telemetry Use: Acute MI/Unstable Angina/Rule out ACS             Imaging: No pertinent imaging reviewed.     Recent Cultures (last 7 days):         Last 24 Hours Medication List:   Current Facility-Administered Medications   Medication Dose Route Frequency Provider Last Rate   • acetaminophen  650 mg Oral Q8H PRN Teresa De La Rosa MD     • amLODIPine  5 mg Oral Daily Teresa De La Rosa MD     • aspirin  81 mg Oral Daily Eileen Angel MD     • atorvastatin  40 mg Oral Daily With Cecy Bob MD     • enoxaparin  40 mg Subcutaneous Daily Uriel Perez MD     • FLUoxetine  40 mg Oral Daily Eileen Angel MD     • losartan  50 mg Oral Daily Uriel Perez MD     • nitroglycerin  0.4 mg Sublingual Q5 Min PRN Uriel Perez MD     • traZODone  50 mg Oral HS Uriel Perez MD          Today, Patient Was Seen By: Uriel Perez MD    **Please Note: This note may have been constructed using a voice recognition system. **

## 2023-07-19 NOTE — DISCHARGE SUMMARY
[address]  Discharge- Bola Fine 1945, 70 y.o. male MRN: [id number]  Unit/Bed#: S -01 Encounter: [id number]  Primary Care Provider: Grady Delgado MD   Date and time admitted to hospital: 7/18/2023 10:51 AM    * Chest pain  Assessment & Plan  Patient reports episode of 7 out of 10 chest pain that was associated with nausea, diaphoresis, diarrhea, left arm pain, lightheadedness, and headache. States episode similar to an episode 5 years ago where he needed to receive a stent placement. Patient was given nitro tab in the ED. Troponins are negative. EKG revealed normal sinus rhythm. QUYNH score was a 5. Unstable angina vs Stable angina   - stress test performed and was unremarkable  - follow up with cardiology as an outpatient   - Nitroglycerin sublingual prn  - Aspirin 81mg daily  - oxygen prn for O2 < 90%    Anxiety  Assessment & Plan  Patient has a reported history of anxiety, major depression and PTSD. - continue Prozac  - continue trazodone at night    Hyperlipidemia  Assessment & Plan  - continue statin therapy    Hypertension  Assessment & Plan  - continue current home regimen of Amlodipine and Losartan       Medical Problems     Resolved Problems  Date Reviewed: 7/19/2023   None       Discharging Physician / Practitioner: Garry Mansfield MD  PCP: Grady Delgado MD  Admission Date:   Admission Orders (From admission, onward)     Ordered        07/18/23 1558  Place in Observation  Once                      Discharge Date: 07/19/23    Consultations During Hospital Stay:  · none    Procedures Performed:   · Pharmacological stress test    Significant Findings / Test Results:   XR chest 1 view portable   ED Interpretation by Louis Capellan DO (07/18 3664)   No acute cardiopulmonary disease per my interpretation. Final Result by Kasia Garcia MD (37/26 [id number])      No acute cardiopulmonary disease.                   Workstation performed: [id number] ·     Incidental Findings:   · none    Test Results Pending at Discharge (will require follow up):   · none     Outpatient Tests Requested:  · none    Complications:  none    Reason for Admission: chest pain    Hospital Course:   Jhon Olson is a 70 y.o. male patient who originally presented to the hospital on 7/18/2023 due to increasing chest pain, diaphoresis, and left arm pain. Patient was admitted from the ED with worsening chest pain that felt similar to an episode he experienced 5 years ago after which he required catheterization and stent placement. He has had chest pain on and off for many years typically after he has exerted himself. In the emergency department, patient was given aspirin as well as sublingual nitrogen which patient said seem to resolve the pain. His EKG revealed normal sinus rhythm and his troponins were negative. He was admitted to the [address] [facility] for further work-up and evaluation. Due to suspicion of stable angina versus unstable angina, patient was scheduled for a pharmacologic stress test, which revealed no ST deviation and no perfusion deficits. Patient will be discharged home with sublingual nitrogen as needed daily aspirin and continuing home blood pressure medications and anxiety medications. Patient will be instructed to follow-up with cardiology and PCP as an outpatient. The patient, initially admitted to the hospital as inpatient, was discharged earlier than expected given the following: normal cardiac stress test.    Please see above list of diagnoses and related plan for additional information.      Condition at Discharge: stable    Discharge Day Visit / Exam:   Subjective:  See progress note  Vitals: Blood Pressure: 152/75 (07/19/23 1447)  Pulse: 71 (07/19/23 1447)  Temperature: 97.8 °F (36.6 °C) (07/19/23 1447)  Temp Source: Oral (07/19/23 1447)  Respirations: 16 (07/19/23 1447)  SpO2: 98 % (07/19/23 1447)  Exam:   Constitutional:       Appearance: Normal appearance. HENT:      Head: Normocephalic and atraumatic. Right Ear: External ear normal.      Left Ear: External ear normal.      Nose: Nose normal.      Mouth/Throat:      Mouth: Mucous membranes are moist.      Pharynx: Oropharynx is clear. Eyes:      Extraocular Movements: Extraocular movements intact. Conjunctiva/sclera: Conjunctivae normal.   Cardiovascular:      Rate and Rhythm: Normal rate and regular rhythm. Heart sounds: No murmur heard. Pulmonary:      Effort: Pulmonary effort is normal.      Breath sounds: Normal breath sounds. Abdominal:      General: There is no distension. Palpations: Abdomen is soft. Tenderness: There is no abdominal tenderness. Musculoskeletal:         General: No swelling or tenderness. Cervical back: Neck supple. Skin:     General: Skin is warm and dry. Neurological:      Mental Status: He is alert and oriented to person, place, and time. Psychiatric:         Mood and Affect: Mood normal.        Discussion with Family: Patient in contact with wife and for a ride home. Discharge instructions/Information to patient and family:   See after visit summary for information provided to patient and family. Provisions for Follow-Up Care:  See after visit summary for information related to follow-up care and any pertinent home health orders. Disposition:   Home    Planned Readmission: no     Discharge Statement:  I spent 60 minutes discharging the patient. This time was spent on the day of discharge. I had direct contact with the patient on the day of discharge. Greater than 50% of the total time was spent examining patient, answering all patient questions, arranging and discussing plan of care with patient as well as directly providing post-discharge instructions. Additional time then spent on discharge activities.     Discharge Medications:  See after visit summary for reconciled discharge medications provided to patient and/or family.       **Please Note: This note may have been constructed using a voice recognition system**

## 2023-07-19 NOTE — ASSESSMENT & PLAN NOTE
Patient reports episode of 7 out of 10 chest pain that was associated with nausea, diaphoresis, diarrhea, left arm pain, lightheadedness, and headache. States episode similar to an episode 5 years ago where he needed to receive a stent placement. Patient was given nitro tab in the ED. Troponins are negative. EKG revealed normal sinus rhythm. QUYNH score was a 5.    Unstable angina vs Stable angina   - stress test performed  - cardiology consulted and appreciate recs  - Nitroglycerin sublingual prn  - Aspirin 81mg daily  - oxygen prn for O2 < 90%

## 2023-07-19 NOTE — ASSESSMENT & PLAN NOTE
Patient reports episode of 7 out of 10 chest pain that was associated with nausea, diaphoresis, diarrhea, left arm pain, lightheadedness, and headache. States episode similar to an episode 5 years ago where he needed to receive a stent placement. Patient was given nitro tab in the ED. Troponins are negative. EKG revealed normal sinus rhythm. QUYNH score was a 5.    Unstable angina vs Stable angina   - stress test performed and was unremarkable  - follow up with cardiology as an outpatient   - Nitroglycerin sublingual prn  - Aspirin 81mg daily  - oxygen prn for O2 < 90%

## 2023-07-19 NOTE — UTILIZATION REVIEW
Initial Clinical Review    Admission: Date/Time/Statement:   Admission Orders (From admission, onward)     Ordered        07/18/23 1558  Place in Observation  Once                      Orders Placed This Encounter   Procedures   • Place in Observation     Standing Status:   Standing     Number of Occurrences:   1     Order Specific Question:   Level of Care     Answer:   Med Surg [16]     ED Arrival Information     Expected   -    Arrival   7/18/2023 10:43    Acuity   Emergent            Means of arrival   Walk-In    Escorted by   Family Member    Service   Hospitalist    Admission type   Emergency            Arrival complaint   Chest Pain- Cardiac PT           Chief Complaint   Patient presents with   • Chest Pain     X1 hour. H/o MI       Initial Presentation: 70 y.o. male to ED via walk-in from home  Present to ED with chest pain 7/10  associated nausea, diaphoresis, diarrhea, left arm pain, lightheadedness, and headache. Endorses increases shortness of breath on exertion; He took an aspirin and an antacid at home hoping that it would resolve the pain, but it did not help. Patient reports that this is similar to an episode that he experienced about 5 years ago where he subsequently needed to undergo cardiac catheterization and stent placement. PMHX hypertension, anxiety/depression, MI, and hyperlipidemia   Admitted to OBS with DX: Chest Pain  on exam: QUYNH score of 5; hypertensive; still experiencing left arm pain and a headache.   PLAN: Cardiopulmonary monitoring; trend trop; monitor labs; cardiology consult; pain control; monitor bp and bp control; f/u stress test; cont asa / statin        ED Triage Vitals   Temperature Pulse Respirations Blood Pressure SpO2   07/18/23 1115 07/18/23 1058 07/18/23 1058 07/18/23 1058 07/18/23 1058   97.7 °F (36.5 °C) 71 18 165/95 97 %      Temp Source Heart Rate Source Patient Position - Orthostatic VS BP Location FiO2 (%)   07/18/23 2143 07/18/23 1058 07/18/23 1058 07/18/23 1058 --   Oral Monitor Sitting Right arm       Pain Score       07/18/23 1058       9          Wt Readings from Last 1 Encounters:   05/01/23 90.3 kg (199 lb)     Additional Vital Signs:   Date/Time Temp Pulse Resp BP MAP (mmHg) SpO2 O2 Device Patient Position - Orthostatic VS   07/19/23 0700 97.5 °F (36.4 °C) 58 16 155/96 113 98 % None (Room air) Lying   07/19/23 0447 -- -- -- 160/92 -- -- -- Lying   07/18/23 2205 -- -- -- 163/93 -- -- -- --   07/18/23 2143 97.7 °F (36.5 °C) 59 18 173/88 Abnormal  125 96 % None (Room air) Sitting   07/18/23 1445 -- 60 16 146/83 107 98 % None (Room air) Sitting   07/18/23 1442 -- 60 16 146/83 -- 98 % None (Room air) Sitting   07/18/23 1230 -- 64 16 129/78 98 93 % None (Room air) Sitting   07/18/23 1200 -- 66 16 140/82 106 96 % None (Room air) Sitting   07/18/23 1117 -- -- -- -- -- -- None (Room air) --   07/18/23 1115 97.7 °F (36.5 °C) -- -- -- -- -- -- --   07/18/23 1058 -- 71 18 165/95 -- 97 % None (Room air) Sitting           EKG: Normal sinus rhythm  Normal ECG  When compared with ECG of 30-JUN-2018 13:28,  No significant change was found    Pertinent Labs/Diagnostic Test Results:   XR chest 1 view portable   ED Interpretation by Nasim Good DO (07/18 9984)   No acute cardiopulmonary disease per my interpretation. Final Result by Janeth Fontanez MD (17/68 3049)      No acute cardiopulmonary disease.                   Workstation performed: [id number]               Results from last 7 days   Lab Units 07/18/23  1124   WBC Thousand/uL 8.05   HEMOGLOBIN g/dL 14.4   HEMATOCRIT % 42.1   PLATELETS Thousands/uL 160   NEUTROS ABS Thousands/µL 5.36         Results from last 7 days   Lab Units 07/19/23  0448 07/18/23  1318 07/18/23  1124   SODIUM mmol/L 141 135 142   POTASSIUM mmol/L 3.7 4.9 2.3*   CHLORIDE mmol/L 108 106 121*   CO2 mmol/L 27 26 21   ANION GAP mmol/L 6 3 0   BUN mg/dL 9 10 7   CREATININE mg/dL 0.78 0.75 0.49*   EGFR ml/min/1.73sq m 87 89 106   CALCIUM mg/dL 8.8 8.9 5.6*   MAGNESIUM mg/dL 2.3 2.6 1.3*     Results from last 7 days   Lab Units 07/18/23  1318 07/18/23  1124   AST U/L 23 9*   ALT U/L 13 9   ALK PHOS U/L 104 73   TOTAL PROTEIN g/dL 6.6 4.2*   ALBUMIN g/dL 3.7 2.5*   TOTAL BILIRUBIN mg/dL 0.68 0.47         Results from last 7 days   Lab Units 07/19/23  0448 07/18/23  1318 07/18/23  1124   GLUCOSE RANDOM mg/dL 86 85 51*       Results from last 7 days   Lab Units 07/18/23 2012 07/18/23  1637 07/18/23  1442 07/18/23  1124   HS TNI 0HR ng/L  --   --  4 5   HS TNI 2HR ng/L  --  5  --   --    HSTNI D2 ng/L  --  1  --   --    HS TNI 4HR ng/L 4  --   --   --    HSTNI D4 ng/L 0  --   --   --        ED Treatment:   Medication Administration from 07/18/2023 1043 to 07/18/2023 1826       Date/Time Order Dose Route Action     07/18/2023 1125 EDT nitroglycerin (NITROSTAT) SL tablet 0.4 mg 0.4 mg Sublingual Given     07/18/2023 1145 EDT acetaminophen (TYLENOL) tablet 650 mg 650 mg Oral Given     07/18/2023 1145 EDT aspirin tablet 325 mg 325 mg Oral Given     07/18/2023 1216 EDT magnesium sulfate IVPB (premix) SOLN 1 g 1 g Intravenous New Bag          Admitting Diagnosis: Unstable angina (HCC) [I20.0]  ACS (acute coronary syndrome) ([address] ) [I24.9]     Age/Sex: 70 y.o. male     Admission Orders: SCDs; Cardiopulmonary monitoring; cardiac diet    Scheduled Medications:  amLODIPine, 5 mg, Oral, Daily  aspirin, 81 mg, Oral, Daily  atorvastatin, 40 mg, Oral, Daily With Dinner  enoxaparin, 40 mg, Subcutaneous, Daily  FLUoxetine, 40 mg, Oral, Daily  losartan, 50 mg, Oral, Daily  traZODone, 50 mg, Oral, HS      Continuous IV Infusions: None     PRN Meds:  acetaminophen, 650 mg, Oral, Q8H PRN  nitroglycerin, 0.4 mg, Sublingual, Q5 Min PRN        IP CONSULT TO CARDIOLOGY    Network Utilization Review Department  ATTENTION: Please call with any questions or concerns to [phone] and carefully listen to the prompts so that you are directed to the right person.  All voicemails are confidential.  Herlinda Ewing all requests for admission clinical reviews, approved or denied determinations and any other requests to dedicated fax number below belonging to the campus where the patient is receiving treatment.  List of dedicated fax numbers for the Facilities:  [place] DENIALS (Administrative/Medical Necessity) [phone] [address] (Maternity/NICU/Pediatrics) [phone]   [address] [place] [address] [phone]   [facility] [address] [facility] [phone]   [address] [place] [address] [address] [place] [address] [phone]   [address] [facility] [address] [phone]

## 2023-07-19 NOTE — DISCHARGE INSTR - AVS FIRST PAGE
Dear Malini Lockwood,     It was our pleasure to care for you here at [facility]. It is our hope that we were always able to exceed the expected standards for your care during your stay. You were hospitalized due to chest pain. You were cared for on the medicine floor by Kym Membreno MD under the service of August Castrejon MD with the [facility] who covers for your primary care physician (PCP), Denisha Fountain MD, while you were hospitalized. If you have any questions or concerns related to this hospitalization, you may contact us at [phone]. For follow up as well as any medication refills, we recommend that you follow up with your primary care physician. A registered nurse will reach out to you by phone within a few days after your discharge to answer any additional questions that you may have after going home. However, at this time we provide for you here, the most important instructions / recommendations at discharge:     Notable Medication Adjustments -   Continue prior to admission medications  Testing Required after Discharge -   none  Important follow up information -   Follow up with your PCP in one week  Follow up with cardiology as an outpatient  Other Instructions -   Take all medications as indicated  If increasing pain or worsening of symptoms, contact your PCP immediately or go to the emergency department  Please review this entire after visit summary as additional general instructions including medication list, appointments, activity, diet, any pertinent wound care, and other additional recommendations from your care team that may be provided for you.       Sincerely,     Kym Membreno MD

## 2023-07-20 LAB
ATRIAL RATE: 63 BPM
P AXIS: 20 DEGREES
PR INTERVAL: 180 MS
QRS AXIS: 3 DEGREES
QRSD INTERVAL: 82 MS
QT INTERVAL: 436 MS
QTC INTERVAL: 446 MS
T WAVE AXIS: 35 DEGREES
VENTRICULAR RATE: 63 BPM

## 2023-07-20 PROCEDURE — 93010 ELECTROCARDIOGRAM REPORT: CPT | Performed by: INTERNAL MEDICINE

## 2023-08-25 DIAGNOSIS — F33.1 MDD (MAJOR DEPRESSIVE DISORDER), RECURRENT EPISODE, MODERATE (HCC): ICD-10-CM

## 2023-08-25 DIAGNOSIS — F43.10 PTSD (POST-TRAUMATIC STRESS DISORDER): ICD-10-CM

## 2023-08-27 RX ORDER — FLUOXETINE HYDROCHLORIDE 40 MG/1
40 CAPSULE ORAL DAILY
Qty: 90 CAPSULE | Refills: 0 | Status: SHIPPED | OUTPATIENT
Start: 2023-08-27

## 2023-09-17 DIAGNOSIS — F33.1 MDD (MAJOR DEPRESSIVE DISORDER), RECURRENT EPISODE, MODERATE (HCC): ICD-10-CM

## 2023-09-17 DIAGNOSIS — F99 INSOMNIA DUE TO OTHER MENTAL DISORDER: ICD-10-CM

## 2023-09-17 DIAGNOSIS — F51.05 INSOMNIA DUE TO OTHER MENTAL DISORDER: ICD-10-CM

## 2023-09-17 DIAGNOSIS — F43.10 PTSD (POST-TRAUMATIC STRESS DISORDER): ICD-10-CM

## 2023-09-18 RX ORDER — TRAZODONE HYDROCHLORIDE 50 MG/1
25-50 TABLET ORAL
Qty: 90 TABLET | Refills: 1 | Status: SHIPPED | OUTPATIENT
Start: 2023-09-18

## 2024-02-02 ENCOUNTER — TELEPHONE (OUTPATIENT)
Dept: NEUROLOGY | Facility: CLINIC | Age: 79
End: 2024-02-02

## 2024-02-02 NOTE — TELEPHONE ENCOUNTER
Patient LOV with [facility] 2020.    Patient called in to schedule appointment.    Patient gave verbally consent at one point to continue triage with wife Kristy.    Patient was triaged.    Triage was sent for review and is awaiting response to schedule patient according.    Patient updated NEW work number and Old number needs removal.

## 2024-02-21 PROBLEM — Z12.11 SCREENING FOR MALIGNANT NEOPLASM OF COLON: Status: RESOLVED | Noted: 2020-08-10 | Resolved: 2024-02-21

## 2024-04-04 DIAGNOSIS — F33.1 MDD (MAJOR DEPRESSIVE DISORDER), RECURRENT EPISODE, MODERATE (HCC): ICD-10-CM

## 2024-04-04 DIAGNOSIS — F43.10 PTSD (POST-TRAUMATIC STRESS DISORDER): ICD-10-CM

## 2024-04-05 RX ORDER — FLUOXETINE HYDROCHLORIDE 40 MG/1
40 CAPSULE ORAL DAILY
Qty: 90 CAPSULE | Refills: 0 | OUTPATIENT
Start: 2024-04-05

## 2024-04-15 DIAGNOSIS — F33.1 MDD (MAJOR DEPRESSIVE DISORDER), RECURRENT EPISODE, MODERATE (HCC): ICD-10-CM

## 2024-04-15 DIAGNOSIS — F43.10 PTSD (POST-TRAUMATIC STRESS DISORDER): ICD-10-CM

## 2024-04-16 RX ORDER — FLUOXETINE HYDROCHLORIDE 40 MG/1
40 CAPSULE ORAL DAILY
Qty: 90 CAPSULE | Refills: 0 | Status: SHIPPED | OUTPATIENT
Start: 2024-04-16 | End: 2024-04-18 | Stop reason: SDUPTHER

## 2024-04-18 ENCOUNTER — TELEMEDICINE (OUTPATIENT)
Dept: PSYCHIATRY | Facility: CLINIC | Age: 79
End: 2024-04-18

## 2024-04-18 DIAGNOSIS — F43.10 PTSD (POST-TRAUMATIC STRESS DISORDER): ICD-10-CM

## 2024-04-18 DIAGNOSIS — F99 INSOMNIA DUE TO OTHER MENTAL DISORDER: ICD-10-CM

## 2024-04-18 DIAGNOSIS — F51.05 INSOMNIA DUE TO OTHER MENTAL DISORDER: ICD-10-CM

## 2024-04-18 DIAGNOSIS — F33.40 MDD (RECURRENT MAJOR DEPRESSIVE DISORDER) IN REMISSION (HCC): Primary | ICD-10-CM

## 2024-04-18 DIAGNOSIS — F33.1 MDD (MAJOR DEPRESSIVE DISORDER), RECURRENT EPISODE, MODERATE (HCC): ICD-10-CM

## 2024-04-18 PROBLEM — G47.10 HYPERSOMNIA: Chronic | Status: RESOLVED | Noted: 2018-10-08 | Resolved: 2021-09-29

## 2024-04-18 PROBLEM — F41.9 ANXIETY: Status: RESOLVED | Noted: 2023-07-18 | Resolved: 2024-04-18

## 2024-04-18 PROBLEM — F43.22 ADJUSTMENT DISORDER WITH ANXIETY: Status: RESOLVED | Noted: 2018-08-06 | Resolved: 2024-04-18

## 2024-04-18 RX ORDER — TRAZODONE HYDROCHLORIDE 50 MG/1
25-50 TABLET ORAL
Qty: 90 TABLET | Refills: 1 | Status: SHIPPED | OUTPATIENT
Start: 2024-04-18

## 2024-04-18 RX ORDER — FLUOXETINE HYDROCHLORIDE 20 MG/1
20 CAPSULE ORAL DAILY
Qty: 90 CAPSULE | Refills: 1 | Status: SHIPPED | OUTPATIENT
Start: 2024-04-18

## 2024-04-18 RX ORDER — TRAZODONE HYDROCHLORIDE 50 MG/1
25-50 TABLET ORAL
Qty: 90 TABLET | Refills: 1 | Status: CANCELLED | OUTPATIENT
Start: 2024-04-18

## 2024-04-18 RX ORDER — MEMANTINE HYDROCHLORIDE 5 MG/1
5 TABLET ORAL DAILY
COMMUNITY

## 2024-04-18 NOTE — PSYCH
Virtual Regular Visit    Verification of patient location:    Patient is located at Other in the following state in which I hold an active license [place]      Assessment/Plan:    Problem List Items Addressed This Visit          Behavioral Health    MDD (recurrent major depressive disorder) in remission (HCC) - Primary    Relevant Medications    memantine (NAMENDA) 5 mg tablet    PTSD (post-traumatic stress disorder)    Relevant Medications    memantine (NAMENDA) 5 mg tablet       Neurology/Sleep    Insomnia            Reason for visit is No chief complaint on file.       Encounter provider Maco Dewey III, DO    Provider located at [facility]  [address] [address] [phone]      Recent Visits  No visits were found meeting these conditions.  Showing recent visits within past 7 days and meeting all other requirements  Today's Visits  Date Type Provider Dept   04/18/24 Telemedicine [facility] III[facility]   Showing today's visits and meeting all other requirements  Future Appointments  No visits were found meeting these conditions.  Showing future appointments within next 150 days and meeting all other requirements       The patient was identified by name and date of birth. Jose F Camacho was informed that this is a telemedicine visit and that the visit is being conducted throughthe Spyra platform. He agrees to proceed..  My office door was closed. No one else was in the room.  He acknowledged consent and understanding of privacy and security of the video platform. The patient has agreed to participate and understands they can discontinue the visit at any time.    Patient is aware this is a billable service.     Subjective  See below    HPI     Past Medical History:   Diagnosis Date    Cancer (HCC)     Skin    Concussion     Heart attack (HCC)     Hyperlipidemia     Hypertension        Past  "Surgical History:   Procedure Laterality Date    CORONARY ANGIOPLASTY WITH STENT PLACEMENT      HAND SURGERY      Trigger finger x 3    KNEE SURGERY Bilateral     Scope       Current Outpatient Medications   Medication Sig Dispense Refill    memantine (NAMENDA) 5 mg tablet Take 5 mg by mouth daily      amLODIPine (NORVASC) 5 mg tablet Take 5 mg by mouth daily      aspirin 81 MG tablet Take 81 mg by mouth daily       FLUoxetine (PROzac) 40 MG capsule Take 1 capsule (40 mg total) by mouth daily 90 capsule 0    losartan (COZAAR) 50 mg tablet Take 50 mg by mouth daily      nitroglycerin (NITROSTAT) 0.4 mg SL tablet Place 1 tablet (0.4 mg total) under the tongue every 5 (five) minutes as needed for chest pain 90 tablet 0    rosuvastatin (CRESTOR) 20 MG tablet Take 20 mg by mouth daily at bedtime      traZODone (DESYREL) 50 mg tablet TAKE 0.5-1 TABLETS (25-50 MG TOTAL) BY MOUTH DAILY AT BEDTIME . PLEASE CALL FOR APPOINTMENT IN ORDER TO GET 90 DAY PRESCRIPTION 90 tablet 1     No current facility-administered medications for this visit.        Allergies   Allergen Reactions    Codeine     Hydrocodone Nausea Only    Other      Annotation - 06Oct2016: anesthesia - took a long time to wake up and was very nauseated and with vomiting    Oxycodone GI Intolerance, Nausea Only and Abdominal Pain    Prednisone Other (See Comments)     Other reaction(s): Depression, mood swings, anger  psychosis       Review of Systems    Video Exam    There were no vitals filed for this visit.    Physical Exam     Visit Time    Visit Start Time: 933  Visit Stop Time: 1005  Total Visit Duration:  31 minutes      MEDICATION MANAGEMENT NOTE        [facility]      Name and Date of Birth:  Jose F Camacho 78 y.o. 1945    Date of Visit: April 18, 2024    SUBJECTIVE:  CC: Jose F presents today for follow up on \"going good\"; PTSD, anxiety symptoms     MARIO is doing well. 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 Teaching going well also, a lot of student success. Building being updated for better studio facilities. Discussed lifestyle, self care, wt loss plans, professional life, accomplishments, things he looks forward too, things that are causing him some worry (but not concerning level for medications or therapy).    Some memory issues, will be following up with PCP    Some audio/light overstimulation, seems likely organic but he will be mindful to make sure this is not something PTSD driven.    He feels good, and wants to reduce prozac.      F/u PRN= was thinking about retiring May 2022 and move to [place], but now that is all up in the air.  Mychal (Sebastien's wife)  Sosa (Granddaughter)  Hortencia (Sosa's mom)  Aditya (s/o to Hortencia, legal issues re: child pornography)  Maco (son)      Since our last visit, overall symptoms have been improving.      Med Compliance: yes    HPI ROS:                      ('was' below is from prior visit)  Medication Side Effects (other than noted):  no     Depression (10 worst):  low (Was moderate)   Anxiety (10 worst):  low (Was low, sometimes moderate)   Hallucinations or Psychosis  no (Was no)    Safety concerns (self harm thoughts, suicidal ideation, HI, etc):  no (Was no)   Sleep: (NM = Nightmares)  good (Was good)   Energy:  good (Was decent)   Appetite:  no (Was no issues)   Weight Change:  Stable, maybe slight loss          PHQ-2/9 Depression Screening    Little interest or pleasure in doing things: 0 - not at all  Feeling down, depressed, or hopeless: 1 - several days  Trouble falling or staying asleep, or sleeping too much: 1 - several days  Feeling tired or having little energy: 1 - several days  Poor appetite or overeatin - not at all  Feeling bad about yourself - or that you are a failure or have let yourself or your family down: 1 - several days  Trouble concentrating on things, such as reading the newspaper  or watching television: 1 - several days  Moving or speaking so slowly that other people could have noticed. Or the opposite - being so fidgety or restless that you have been moving around a lot more than usual: 0 - not at all             Jose F denies any side effects from medications unless noted above    Review Of Systems as noted above. Otherwise A relevant review of symptoms was otherwise negative    History Review: The following portions of the patient's history were reviewed and documented: allergies, current medications, past family history, past medical history, past social history, and problem list.     Lab Review: Labs were reviewed and discussed with patient      OBJECTIVE:     MENTAL STATUS EXAM  Appearance:  age appropriate   Behavior:  pleasant, cooperative, with good eye contact   Speech:  Normal volume, regular rate and rhythm   Mood:  euthymic   Affect:  mood congruent   Language: intact and appropriate for age, education, and intellect   Thought Process:  Linear and goal directed   Associations: intact associations   Thought Content:  normal and appropriate   Perceptual Disturbances: no auditory or visual hallcunations   Risk Potential / Abnormal Thoughts: Suicidal ideation - None  Homicidal ideation - None  Potential for aggression - No       Consciousness:  Alert & Awake   Sensorium:  Grossly oriented   Attention: attention span and concentration are age appropriate       Fund of Knowledge:  Memory: awareness of current events: yes  recent and remote memory grossly intact   Insight:  good   Judgment: good   Muscle Strength Muscle Tone:      Gait/Station:    Motor Activity:        Risks, Benefits And Possible Side Effects Of Medications:    AGREE: Risks, benefits, and possible side effects of medications explained to Jose F and he (or legal representative) verbalizes understanding and agreement for treatment.    Controlled Medication Discussion:     Not  "applicable  _____________________________________________________________          Psychiatric History  Sebastien has never been hospitalized for mental health had suicide attempts of harm or homicidal ideation or violence towards others.     Social History:  Patient was raised in a \"tightknit\" family. He was only child but had a lot of relatives. No physical or sexual abuse or other trauma history. He developed normally. He is Sic[place]. 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 He also is a . He considers both equally important in his professional life. He is  to his wife's onto and has a son who is not biological but his wife's son. He considers him his son and he is currently in the [employer]. He lives alone with his wife. Good support system. He is rooming [employer]. No  history or legal issues. His wife does have a gun at home.     He does not use tobacco, has 2 cups of coffee typically per day, and only drinks socially on the weekends. He has no history of substance use and no history of rehabilitation         Family Psychiatric History:   Family History    1. Denied: Family history of substance abuse   2. Denied: Family history of suicide   3. Denied: FH: mental illness      Assessment/Plan:        Diagnoses and all orders for this visit:    MDD (recurrent major depressive disorder) in remission (HCC)    PTSD (post-traumatic stress disorder)    MDD (major depressive disorder), recurrent episode, moderate (HCC)    Insomnia due to other mental disorder    Other orders  -     memantine (NAMENDA) 5 mg tablet; Take 5 mg by mouth daily          ______________________________________________________________________    PTSD (unbrella to L temple)  MDD  Adjustment d/o with anxiety (RESOLVED 4/18/2024)     PTSD - still negative reactions to loud noises and bright lights. Otherwise manageable  MDD - not at goal  Adjustment d/o with " anxiety s/p MI and other events - (RESOLVED 4/18/2024)    Sebastien is doing well, will reduce prozac and send back to PCP. Likely will want/need MoCA or MMSE in future, will discuss memory concerns with PCP. PCP with [facility] (Memory concerns, vision loss transient in one eye after which he did see ophthalmologist and was cleared, med management transition). MOCA 28/30 11/26/2021. TSH, CBC, CMP done in 11/2022 unremarkable. He had neuropsych testing done in past, he thinks from [facility]. Can revisit NEUROLOGY documentation and referral (discussed but he preferred just neuropsych today).      Safety Risk Assessment: See above; Did have previous fleeting SI about OD on prozc, no intent, easily controlled. Existential questioning  with grief, life transitions. Good support, no h/o suicide attempt, good protective factors, does have medical issues, difficult transitions, and other risk considerations.. In considering risk and protective factors (including guilt about idea of hurting his family), suicide risk and safety risk is low presently.   Given Crisis number, suicide hotline      Confidential Assessment:  prozac  abilify  Prazosin (did not seem effective at 1-2mg/ low dose only)  Clonidine  Adderall (he did not feel good on this; 8/25/2017 H&P)  Memantine  lorazepam  Amitriptyline - 10mg Neuro    Scales:       Treatment Plan:      Patient has been educated about their diagnosis and treatment modalities. They voiced understanding and agreement with the following plan:     1) medication:Discussed medications and if treatment adjustment was needed/desired.   - REDUCE Prozac to 20mg daily (4/18/2024)   - CHANGE Trazodone 25-50mg HS to PRN (4/18/2024)    2) labs:    - 5/2018 TG, HDL, WNL, Glucose 90   - 8/2017: CBC and CMP within normal limits, EKG  and normal sinus rhythm    - 9/2016 TSH 1.556      3) therapy:   - PRN     4) medical: Postconcussive syndrome, concussion September 2016 from a  restaurant umbrella hitting him and his left temple area. 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 Being that he was an only son he had to take care of his father and this was difficult. He lost his mom prior to his father   - Grandmother passing (open casket) when he was 10, a lot of death in his life.    - Sebastien's bio son Maco in [place], Pt raised Sosa ([facility] [facility]), a lot of stressors related. Custody issues   - MI end of July 2018     6) follow up: Discharge back to PCP     7) Treatment plan: Enacted 10/24/2017, renewed 5/1/18, 8/6/2018, 4/5/2019, Computer system went down so could not complete treatment plan on 11/8/2019,  1/21/2020, 2/10/2023, No (D/c To PCP 4/18/2024)      Discussed self monitoring of symptoms, and symptom monitoring tools.    Patient has been informed of 24 hours and weekend coverage for urgent situations accessed by calling the main clinic phone number.         Psychotherapy in session:  Time spent performing psychotherapy: 16 Minutes supportive therapy mostly surrounding wellbeing, self care, professional accomplishments, life stressors (mostly age and health related).

## 2024-09-16 DIAGNOSIS — F33.1 MDD (MAJOR DEPRESSIVE DISORDER), RECURRENT EPISODE, MODERATE (HCC): ICD-10-CM

## 2024-09-16 DIAGNOSIS — F43.10 PTSD (POST-TRAUMATIC STRESS DISORDER): ICD-10-CM

## 2024-09-16 NOTE — TELEPHONE ENCOUNTER
Reason for call:   [x] Refill   [] Prior Auth  [] Other:     Office:   [x] Specialty/Provider - psych / Maco    Medication: fluoxetine    Dose/Frequency: 20mg qd    Quantity: 90    Pharmacy: [id number] [place] - [id number]     Does the patient have enough for 3 days?   [x] Yes   [] No - Send as HP to POD

## 2024-10-11 DIAGNOSIS — F99 INSOMNIA DUE TO OTHER MENTAL DISORDER: ICD-10-CM

## 2024-10-11 DIAGNOSIS — F33.1 MDD (MAJOR DEPRESSIVE DISORDER), RECURRENT EPISODE, MODERATE (HCC): ICD-10-CM

## 2024-10-11 DIAGNOSIS — F51.05 INSOMNIA DUE TO OTHER MENTAL DISORDER: ICD-10-CM

## 2024-10-11 DIAGNOSIS — F43.10 PTSD (POST-TRAUMATIC STRESS DISORDER): ICD-10-CM

## 2024-10-11 RX ORDER — TRAZODONE HYDROCHLORIDE 50 MG/1
TABLET, FILM COATED ORAL
Qty: 90 TABLET | Refills: 1 | Status: SHIPPED | OUTPATIENT
Start: 2024-10-11

## 2024-10-24 ENCOUNTER — OFFICE VISIT (OUTPATIENT)
Dept: NEUROSURGERY | Facility: CLINIC | Age: 79
End: 2024-10-24
Payer: COMMERCIAL

## 2024-10-24 VITALS
SYSTOLIC BLOOD PRESSURE: 167 MMHG | TEMPERATURE: 98.4 F | HEART RATE: 63 BPM | DIASTOLIC BLOOD PRESSURE: 98 MMHG | OXYGEN SATURATION: 97 %

## 2024-10-24 DIAGNOSIS — M54.12 CERVICAL RADICULOPATHY: Chronic | ICD-10-CM

## 2024-10-24 DIAGNOSIS — M25.561 CHRONIC PAIN OF RIGHT KNEE: ICD-10-CM

## 2024-10-24 DIAGNOSIS — G89.29 CHRONIC PAIN OF RIGHT KNEE: ICD-10-CM

## 2024-10-24 DIAGNOSIS — M48.061 SPINAL STENOSIS OF LUMBAR REGION WITHOUT NEUROGENIC CLAUDICATION: ICD-10-CM

## 2024-10-24 DIAGNOSIS — M48.061 LUMBAR FORAMINAL STENOSIS: Primary | ICD-10-CM

## 2024-10-24 PROCEDURE — 99204 OFFICE O/P NEW MOD 45 MIN: CPT | Performed by: NURSE PRACTITIONER

## 2024-10-24 RX ORDER — PREDNISONE 10 MG/1
TABLET ORAL
COMMUNITY
Start: 2024-10-23

## 2024-10-24 RX ORDER — METHOCARBAMOL 750 MG/1
750 TABLET, FILM COATED ORAL EVERY 6 HOURS PRN
Qty: 14 TABLET | Refills: 0 | Status: SHIPPED | OUTPATIENT
Start: 2024-10-24

## 2024-10-24 RX ORDER — ALPRAZOLAM 0.5 MG
0.5 TABLET ORAL ONCE AS NEEDED
Qty: 2 TABLET | Refills: 0 | Status: SHIPPED | OUTPATIENT
Start: 2024-10-24

## 2024-10-24 NOTE — PROGRESS NOTES
"Ambulatory Visit  Name: Jose F Camacho      : 1945      MRN: [id number]  Encounter Provider: SAMREEN Hauser  Encounter Date: 10/24/2024   Encounter department: [facility] NEUROS[facility]    Assessment & Plan  Lumbar foraminal stenosis    Orders:    Ambulatory Referral to Physical Therapy; Future    MRI lumbar spine without contrast; Future    methocarbamol (Robaxin-750) 750 mg tablet; Take 1 tablet (750 mg total) by mouth every 6 (six) hours as needed for muscle spasms    ALPRAZolam (XANAX) 0.5 mg tablet; Take 1 tablet (0.5 mg total) by mouth once as needed for anxiety for up to 2 doses 1 tablet by mouth 1 hour prior to MRI, 1 additional tablet by mouth as needed 30 minutes prior to MRI.    Cervical radiculopathy  C/o longstanding cervical pain.  This was not evaluated at this appointment.         Chronic pain of right knee  Presents for evaluation of R knee pain  Longstanding x many years, worse over last 1.5 weeks.  Follows with orthopedist Dr. Moreira - evaluated yesterday 10/23 and advised that symptoms were likely lumbar radiculopathy.  C/o some low back pain.  S/p R knee injection 2 weeks ago; pain migrated for lateral knee to medial.  Now using wheelchair and cannot ambulate due to pain. States R knee \"gives out.\"  On exam, motor strength is intact. No paresthesias.     Plan:  Patient is s/p R L3, L4, L5 MBB with Dr. Strong at [facility] 10/6/2023. States pain improved significantly following this.   Advised to reach out to Dr. Strong to follow up with further treatments.  Referral placed for PT.  MRI of lumbar spine ordered to evaluate for lumbar etiology as symptoms out of proportion to results of OA on MRI knee.  In past, MRI lumbar spine has indicated significant degenerative disease, would like to evaluate if this has worsened.  Methocarbamol prescribed as patient c/o severe leg spasms at night.  Discussed side effects, mechanism of action etc. Discussed could exacerbate somnolence " associated with trazodone use.  Xanax ordered for claustrophobia in MRI.  Follow up as joint appointment with surgeon once MRI completed.         Spinal stenosis of lumbar region without neurogenic claudication  See above.           History of Present Illness   With past medical history of CAD on aspirin, status post NSTEMI, hypertension, CHANTEL, hyperlipidemia, PTSD and depression, chronic neck and back pain, who presents for evaluation of right knee pain.  He relates that he has had longstanding history of neck, low back and bilateral knee pain.  The pain has been significantly worse on the right.  He is under the care of Dr. Moreira (orthopedics) at [facility] for his knee.  He states over the past 1-1/2 weeks he developed such severe right knee pain that he can barely walk.  About 2 weeks ago he had injection to his right knee and states that the pain moved at that time from lateral position to a more medial position.  Pain is not associated gated with any numbness or tingling.  He states that he has been unable to walk secondary to the pain and his knee gives out on him.  He was advised by Dr. Moreira to evaluate for lumbar etiology of pain as his findings on his recent MRI of his knee were not consistent with degree of pain that patient has been experiencing    He is under the care of Dr. Sawant at [facility] for physiatry.  Last year on 10/6/2023 he underwent L3, L4 and L5 medial branch block with significant improvement in his pain.  Over the past several weeks he has been taking ibuprofen and Tylenol twice daily without any significant relief.  He has been using ice to his knee.  Yesterday he was evaluated by Dr. Moreira and placed on prednisone but he has not noticed any significant therapeutic effect yet.  He is accompanied today by his significant other Mary.  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  He has been out of work secondary to his symptoms.  He has a son aged  40.      Review of Systems   Constitutional: Negative.    HENT: Negative.     Eyes: Negative.    Respiratory: Negative.     Cardiovascular: Negative.    Gastrointestinal: Negative.    Endocrine: Negative.    Genitourinary:  Positive for frequency and urgency.   Musculoskeletal:  Positive for back pain, gait problem (uses crutches over the lastv week, occasionally w/c) and myalgias (occasionally).        Cervical radiculopathy  No recent imaging (MRI C/S  2/12/24)  10/10 R back pain radiating down R leg  N/T/W RLE  PT- canceled with [facility]  PM-Right L3,L4,L5 MBB on 10/6/23  Aspirin/Non smoker/No previous back sx   Skin: Negative.    Allergic/Immunologic: Negative.    Neurological:  Positive for weakness and numbness.   Hematological:  Bruises/bleeds easily.   Psychiatric/Behavioral:  Positive for agitation (Due to pain) and sleep disturbance (Due to pain).    All other systems reviewed and are negative.    I have personally reviewed the MA's review of systems and made changes as necessary.    Pertinent Medical History         Objective     /98   Pulse 63   Temp 98.4 °F (36.9 °C) (Temporal)   SpO2 97%     Physical Exam  Constitutional:       General: He is not in acute distress.     Appearance: He is well-developed. He is not diaphoretic.   Eyes:      General:         Right eye: No discharge.         Left eye: No discharge.      Conjunctiva/sclera: Conjunctivae normal.      Pupils: Pupils are equal, round, and reactive to light.   Pulmonary:      Effort: Pulmonary effort is normal. No respiratory distress.   Abdominal:      General: There is no distension.      Tenderness: There is no abdominal tenderness.   Musculoskeletal:         General: Swelling present.      Cervical back: Normal range of motion and neck supple.      Comments: R knee swollen   Skin:     General: Skin is warm and dry.   Neurological:      Mental Status: He is alert and oriented to person, place, and time.      Cranial Nerves: No cranial  nerve deficit.      Deep Tendon Reflexes: Reflexes normal.      Reflex Scores:       Patellar reflexes are 2+ on the right side and 2+ on the left side.       Achilles reflexes are 2+ on the right side and 2+ on the left side.  Psychiatric:         Behavior: Behavior normal.         Thought Content: Thought content normal.         Judgment: Judgment normal.       Neurologic Exam     Mental Status   Oriented to person, place, and time.     Cranial Nerves     CN III, IV, VI   Pupils are equal, round, and reactive to light.    Motor Exam     Strength   Strength 5/5 except as noted. Mild R knee flexion/extension weakness +4/5     Gait, Coordination, and Reflexes     Reflexes   Right patellar: 2+  Left patellar: 2+  Right achilles: 2+  Left achilles: 2+      I personally reviewed the following image studies in PACS and associated radiology reports: MRI spine. My interpretation of the radiology images/reports is: as above.    Administrative Statements   I have spent a total time of 45 minutes in caring for this patient on the day of the visit/encounter including Instructions for management, Impressions, Counseling / Coordination of care, Documenting in the medical record, Reviewing / ordering tests, medicine, procedures  , and Obtaining or reviewing history  .

## 2024-10-24 NOTE — ASSESSMENT & PLAN NOTE
"Presents for evaluation of R knee pain  Longstanding x many years, worse over last 1.5 weeks.  Follows with orthopedist Dr. Moreira - evaluated yesterday 10/23 and advised that symptoms were likely lumbar radiculopathy.  C/o some low back pain.  S/p R knee injection 2 weeks ago; pain migrated for lateral knee to medial.  Now using wheelchair and cannot ambulate due to pain. States R knee \"gives out.\"  On exam, motor strength is intact. No paresthesias.     Plan:  Patient is s/p R L3, L4, L5 MBB with Dr. Strong at [facility] 10/6/2023. States pain improved significantly following this.   Advised to reach out to Dr. Strong to follow up with further treatments.  Referral placed for PT.  MRI of lumbar spine ordered to evaluate for lumbar etiology as symptoms out of proportion to results of OA on MRI knee.  In past, MRI lumbar spine has indicated significant degenerative disease, would like to evaluate if this has worsened.  Methocarbamol prescribed as patient c/o severe leg spasms at night.  Discussed side effects, mechanism of action etc. Discussed could exacerbate somnolence associated with trazodone use.  Xanax ordered for claustrophobia in MRI.  Follow up as joint appointment with surgeon once MRI completed.         "

## 2024-10-25 ENCOUNTER — TELEPHONE (OUTPATIENT)
Age: 79
End: 2024-10-25

## 2024-10-25 NOTE — TELEPHONE ENCOUNTER
Pt called requesting we email MRI script to him at [email or web address].  If we cannot please advise him at .  Thanks

## 2024-11-11 ENCOUNTER — HOSPITAL ENCOUNTER (OUTPATIENT)
Dept: RADIOLOGY | Facility: IMAGING CENTER | Age: 79
Discharge: HOME | End: 2024-11-11
Payer: COMMERCIAL

## 2024-11-11 DIAGNOSIS — M48.061 LUMBAR FORAMINAL STENOSIS: ICD-10-CM

## 2024-11-11 PROCEDURE — 72148 MRI LUMBAR SPINE W/O DYE: CPT

## 2024-12-10 NOTE — ASSESSMENT & PLAN NOTE
"Returns for follow up of right knee pain  Longstanding x many years, worse over last couple months  Follows with orthopedics at [facility], undergoing injections.  C/o some low back pain.  Now using wheelchair and cannot ambulate due to pain. States R knee \"gives out.\"  On exam, motor strength is intact. No paresthesias.     Imaging:  MRI lumbar 11/11/24: Unchanged mild lumbar spondylosis, most severe L3-4 and L4-5 as described.     Plan:  Reviewed MRI with patient and Dr. Savage. He has some degenerative changes but no severe canal or foraminal stenosis to account for his symptoms.   No neurosurgery intervention recommended.   Patient is s/p R L3, L4, L5 MBB with Dr. Strong at [facility] 10/6/2023. States pain improved significantly following this. Advised to reach out to Dr. Strong to follow up with further treatments.  Referral placed for PT.  MRI of lumbar spine ordered to evaluate for lumbar etiology as symptoms out of proportion to results of OA on MRI knee.  In past, MRI lumbar spine has indicated significant degenerative disease, would like to evaluate if this has worsened.  Follow up as joint appointment with surgeon once MRI completed.    "

## 2024-12-11 ENCOUNTER — OFFICE VISIT (OUTPATIENT)
Dept: NEUROSURGERY | Facility: CLINIC | Age: 79
End: 2024-12-11
Payer: COMMERCIAL

## 2024-12-11 VITALS
SYSTOLIC BLOOD PRESSURE: 134 MMHG | HEIGHT: 71 IN | BODY MASS INDEX: 28 KG/M2 | RESPIRATION RATE: 16 BRPM | HEART RATE: 68 BPM | OXYGEN SATURATION: 96 % | DIASTOLIC BLOOD PRESSURE: 80 MMHG | TEMPERATURE: 98.4 F | WEIGHT: 200 LBS

## 2024-12-11 DIAGNOSIS — G89.29 CHRONIC PAIN OF RIGHT KNEE: ICD-10-CM

## 2024-12-11 DIAGNOSIS — M25.561 CHRONIC PAIN OF RIGHT KNEE: ICD-10-CM

## 2024-12-11 DIAGNOSIS — M48.061 SPINAL STENOSIS OF LUMBAR REGION WITHOUT NEUROGENIC CLAUDICATION: Primary | ICD-10-CM

## 2024-12-11 PROCEDURE — 99213 OFFICE O/P EST LOW 20 MIN: CPT | Performed by: NEUROLOGICAL SURGERY

## 2024-12-11 RX ORDER — EVOLOCUMAB 140 MG/ML
INJECTION, SOLUTION SUBCUTANEOUS
COMMUNITY
Start: 2024-12-02

## 2024-12-11 RX ORDER — TAMSULOSIN HYDROCHLORIDE 0.4 MG/1
0.4 CAPSULE ORAL
COMMUNITY
Start: 2024-11-15 | End: 2025-11-15

## 2024-12-11 NOTE — ASSESSMENT & PLAN NOTE
Chronic right knee pain consistent with osteoarthritis.  50% improvement in pain following local injection into the knee.  At his request, range for second opinion with orthopedic surgeon.  Orders:    Ambulatory referral to Orthopedic Surgery; Future

## 2024-12-11 NOTE — ASSESSMENT & PLAN NOTE
MRI of the lumbar spine demonstrates mild stenosis.  Overall presentation is more consistent with osteosis arthritis of the right knee as opposed to radicular pain.  Would recommend against any surgery for the lumbar spine at present.  He will follow-up through this office on a as needed basis.

## 2024-12-11 NOTE — PROGRESS NOTES
Name: Jose F Camacho      : 1945      MRN: [id number]  Encounter Provider: Andrew Savage MD  Encounter Date: 2024   [facility]  :  Assessment & Plan  Spinal stenosis of lumbar region without neurogenic claudication  MRI of the lumbar spine demonstrates mild stenosis.  Overall presentation is more consistent with osteosis arthritis of the right knee as opposed to radicular pain.  Would recommend against any surgery for the lumbar spine at present.  He will follow-up through this office on a as needed basis.  Chronic pain of right knee  Chronic right knee pain consistent with osteoarthritis.  50% improvement in pain following local injection into the knee.  At his request, range for second opinion with orthopedic surgeon.  Orders:    Ambulatory referral to Orthopedic Surgery; Future      History of Present Illness   Presents to follow-up on results of MRI of the lumbar spine.  His most recent right knee intra-articular injection has so far provided 50% relief in his pain and he is now able to walk with a cane as opposed to crutches.  He describes some pain behind his hamstring but denies any significant regular back pain rating down his right leg.      Review of Systems   Genitourinary: Negative.         Flomax, enlarged prostate   Musculoskeletal:  Positive for arthralgias (right knee, recent flare up for about two), back pain (right side LBP, at times across, pain shooting up to from knee) and gait problem (ambualting with cane, feeling unsteady). Negative for myalgias.   Neurological:  Positive for weakness (right knee feels as if goint to give out). Negative for numbness.    I have personally reviewed the MA's review of systems and made changes as necessary.    Past Medical History   Past Medical History:   Diagnosis Date    Cancer (HCC)     Skin    Concussion     Heart attack (HCC)     Hyperlipidemia     Hypertension      Past Surgical  History:   Procedure Laterality Date    CORONARY ANGIOPLASTY WITH STENT PLACEMENT      HAND SURGERY      Trigger finger x 3    KNEE SURGERY Bilateral     Scope     Family History   Problem Relation Age of Onset    Congenital heart disease Mother     Breast cancer Mother     Stomach cancer Father     Heart attack Paternal Grandmother       reports that he has never smoked. He has never used smokeless tobacco. He reports current alcohol use. He reports that he does not use drugs.  Current Outpatient Medications on File Prior to Visit   Medication Sig Dispense Refill    ALPRAZolam (XANAX) 0.5 mg tablet Take 1 tablet (0.5 mg total) by mouth once as needed for anxiety for up to 2 doses 1 tablet by mouth 1 hour prior to MRI, 1 additional tablet by mouth as needed 30 minutes prior to MRI. 2 tablet 0    amLODIPine (NORVASC) 5 mg tablet Take 5 mg by mouth daily      aspirin 81 MG tablet Take 81 mg by mouth daily       FLUoxetine (PROzac) 20 mg capsule Take 1 capsule (20 mg total) by mouth daily 90 capsule 0    losartan (COZAAR) 50 mg tablet Take 50 mg by mouth daily      Repatha SureClick 140 MG/ML SOAJ INJECT 1 MILLILITERS SUBCUTANEOUSLY EVERY 2 WEEKS AS DIRECTED      tamsulosin (FLOMAX) 0.4 mg Take 0.4 mg by mouth      methocarbamol (Robaxin-750) 750 mg tablet Take 1 tablet (750 mg total) by mouth every 6 (six) hours as needed for muscle spasms (Patient not taking: Reported on 12/11/2024) 14 tablet 0    nitroglycerin (NITROSTAT) 0.4 mg SL tablet Place 1 tablet (0.4 mg total) under the tongue every 5 (five) minutes as needed for chest pain 90 tablet 0    [DISCONTINUED] memantine (NAMENDA) 5 mg tablet Take 5 mg by mouth daily (Patient not taking: Reported on 10/24/2024)      [DISCONTINUED] predniSONE 10 mg tablet       [DISCONTINUED] rosuvastatin (CRESTOR) 20 MG tablet Take 20 mg by mouth daily at bedtime      [DISCONTINUED] traZODone (DESYREL) 50 mg tablet TAKE 1/2 TO 1 TABLET BY MOUTH DAILY AT BEDTIME AS NEEDED FOR SLEEP  90 tablet 1     No current facility-administered medications on file prior to visit.     Allergies   Allergen Reactions    Codeine     Hydrocodone Nausea Only    Other      Annotation - 06Oct2016: anesthesia - took a long time to wake up and was very nauseated and with vomiting    Oxycodone GI Intolerance, Nausea Only and Abdominal Pain    Prednisone Other (See Comments)     Other reaction(s): Depression, mood swings, anger  psychosis      Current Outpatient Medications on File Prior to Visit   Medication Sig Dispense Refill    ALPRAZolam (XANAX) 0.5 mg tablet Take 1 tablet (0.5 mg total) by mouth once as needed for anxiety for up to 2 doses 1 tablet by mouth 1 hour prior to MRI, 1 additional tablet by mouth as needed 30 minutes prior to MRI. 2 tablet 0    amLODIPine (NORVASC) 5 mg tablet Take 5 mg by mouth daily      aspirin 81 MG tablet Take 81 mg by mouth daily       FLUoxetine (PROzac) 20 mg capsule Take 1 capsule (20 mg total) by mouth daily 90 capsule 0    losartan (COZAAR) 50 mg tablet Take 50 mg by mouth daily      Repatha SureClick 140 MG/ML SOAJ INJECT 1 MILLILITERS SUBCUTANEOUSLY EVERY 2 WEEKS AS DIRECTED      tamsulosin (FLOMAX) 0.4 mg Take 0.4 mg by mouth      methocarbamol (Robaxin-750) 750 mg tablet Take 1 tablet (750 mg total) by mouth every 6 (six) hours as needed for muscle spasms (Patient not taking: Reported on 12/11/2024) 14 tablet 0    nitroglycerin (NITROSTAT) 0.4 mg SL tablet Place 1 tablet (0.4 mg total) under the tongue every 5 (five) minutes as needed for chest pain 90 tablet 0    [DISCONTINUED] memantine (NAMENDA) 5 mg tablet Take 5 mg by mouth daily (Patient not taking: Reported on 10/24/2024)      [DISCONTINUED] predniSONE 10 mg tablet       [DISCONTINUED] rosuvastatin (CRESTOR) 20 MG tablet Take 20 mg by mouth daily at bedtime      [DISCONTINUED] traZODone (DESYREL) 50 mg tablet TAKE 1/2 TO 1 TABLET BY MOUTH DAILY AT BEDTIME AS NEEDED FOR SLEEP 90 tablet 1     No current  "facility-administered medications on file prior to visit.      Social History     Tobacco Use    Smoking status: Never    Smokeless tobacco: Never   Substance and Sexual Activity    Alcohol use: Yes     Comment: 1 bottle of wine a week    Drug use: No    Sexual activity: Yes     Partners: Female        Objective   /80 (BP Location: Right arm, Patient Position: Sitting, Cuff Size: Standard)   Pulse 68   Temp 98.4 °F (36.9 °C) (Temporal)   Resp 16   Ht 5' 11\" (1.803 m)   Wt 90.7 kg (200 lb)   SpO2 96%   BMI 27.89 kg/m²     Physical Exam  Vitals reviewed.   Constitutional:       General: He is not in acute distress.  Eyes:      Extraocular Movements: Extraocular movements intact.   Pulmonary:      Effort: Pulmonary effort is normal. No respiratory distress.   Musculoskeletal:         General: No deformity.      Comments: Full range of motion on flexion extension of the lumbar spine without pain.  Straight leg raise negative bilaterally.   Skin:     General: Skin is warm and dry.   Neurological:      Mental Status: He is alert and oriented to person, place, and time.      Sensory: No sensory deficit.      Motor: No weakness.      Gait: Gait abnormal.      Comments: Walks with an antalgic gait favoring the right knee.  Tends to avoid bending the right knee.   Psychiatric:         Mood and Affect: Mood normal.         Behavior: Behavior normal.       Neurological Exam  Mental Status  Alert. Oriented to person, place, and time.    Cranial Nerves  CN III, IV, VI: Extraocular movements intact bilaterally.    Gait   Abnormal gait.  Walks with an antalgic gait favoring the right knee.  Tends to avoid bending the right knee..      Radiology Results Review: I personally reviewed the following image studies in PACS and associated radiology reports: MRI spine. My interpretation of the radiology images/reports is: MRI of the lumbar spine without contrast dated November 11, 2024.  Normal lumbar lordosis.  Mild " degenerative changes throughout the lumbar spine.  Mild stenosis at L3-4 and L4-5 secondary to degenerative changes.  No significant pressure on neural structures.  Intrathecal contents unremarkable..    Administrative Statements   I have spent a total time of 25 minutes in caring for this patient on the day of the visit/encounter including Diagnostic results, Prognosis, Patient and family education, Importance of tx compliance, Counseling / Coordination of care, Documenting in the medical record, and Reviewing / ordering tests, medicine, procedures  .

## 2025-01-03 ENCOUNTER — TELEMEDICINE (OUTPATIENT)
Dept: PSYCHIATRY | Facility: CLINIC | Age: 80
End: 2025-01-03
Payer: COMMERCIAL

## 2025-01-03 DIAGNOSIS — F33.40 MDD (RECURRENT MAJOR DEPRESSIVE DISORDER) IN REMISSION (HCC): ICD-10-CM

## 2025-01-03 DIAGNOSIS — F43.10 PTSD (POST-TRAUMATIC STRESS DISORDER): ICD-10-CM

## 2025-01-03 PROCEDURE — 99214 OFFICE O/P EST MOD 30 MIN: CPT | Performed by: PSYCHIATRY & NEUROLOGY

## 2025-01-03 PROCEDURE — 90833 PSYTX W PT W E/M 30 MIN: CPT | Performed by: PSYCHIATRY & NEUROLOGY

## 2025-01-03 NOTE — PSYCH
Virtual Regular Visit    Verification of patient location:    Patient is located at Home in the following state in which I hold an active license [place]      Assessment/Plan:    Problem List Items Addressed This Visit    None      Depression Follow-up Plan Completed: Yes    Reason for visit is   Chief Complaint   Patient presents with    Virtual Regular Visit          Encounter provider Maco Dewey III, DO      Recent Visits  No visits were found meeting these conditions.  Showing recent visits within past 7 days and meeting all other requirements  Today's Visits  Date Type Provider Dept   01/03/25 Telemedicine Maco Dewey III, DO [facility] [address]   Showing today's visits and meeting all other requirements  Future Appointments  No visits were found meeting these conditions.  Showing future appointments within next 150 days and meeting all other requirements       The patient was identified by name and date of birth. Jose F Camacho was informed that this is a telemedicine visit and that the visit is being conducted throughthe Epic Embedded platform. He agrees to proceed..  My office door was closed. No one else was in the room.  He acknowledged consent and understanding of privacy and security of the video platform. The patient has agreed to participate and understands they can discontinue the visit at any time.    Patient is aware this is a billable service.     Subjective  See below    HPI     Past Medical History:   Diagnosis Date    Cancer (HCC)     Skin    Concussion     Heart attack (HCC)     Hyperlipidemia     Hypertension        Past Surgical History:   Procedure Laterality Date    CORONARY ANGIOPLASTY WITH STENT PLACEMENT      HAND SURGERY      Trigger finger x 3    KNEE SURGERY Bilateral     Scope       Current Outpatient Medications   Medication Sig Dispense Refill    ALPRAZolam (XANAX) 0.5 mg tablet Take 1 tablet (0.5 mg total) by mouth once as needed for anxiety for up to 2 doses 1 tablet  "by mouth 1 hour prior to MRI, 1 additional tablet by mouth as needed 30 minutes prior to MRI. 2 tablet 0    amLODIPine (NORVASC) 5 mg tablet Take 5 mg by mouth daily      aspirin 81 MG tablet Take 81 mg by mouth daily       FLUoxetine (PROzac) 20 mg capsule Take 1 capsule (20 mg total) by mouth daily 90 capsule 0    losartan (COZAAR) 50 mg tablet Take 50 mg by mouth daily      methocarbamol (Robaxin-750) 750 mg tablet Take 1 tablet (750 mg total) by mouth every 6 (six) hours as needed for muscle spasms (Patient not taking: Reported on 12/11/2024) 14 tablet 0    nitroglycerin (NITROSTAT) 0.4 mg SL tablet Place 1 tablet (0.4 mg total) under the tongue every 5 (five) minutes as needed for chest pain 90 tablet 0    Repatha SureClick 140 MG/ML SOAJ INJECT 1 MILLILITERS SUBCUTANEOUSLY EVERY 2 WEEKS AS DIRECTED      tamsulosin (FLOMAX) 0.4 mg Take 0.4 mg by mouth       No current facility-administered medications for this visit.        Allergies   Allergen Reactions    Codeine     Hydrocodone Nausea Only    Other      Annotation - 06Oct2016: anesthesia - took a long time to wake up and was very nauseated and with vomiting    Oxycodone GI Intolerance, Nausea Only and Abdominal Pain    Prednisone Other (See Comments)     Other reaction(s): Depression, mood swings, anger  psychosis       Review of Systems    Video Exam    There were no vitals filed for this visit.    Physical Exam     Visit Time    Visit Start Time: 1004  Visit Stop Time: 1036   Total Visit Duration: 32 minutes                MEDICATION MANAGEMENT NOTE        [facility]      Name and Date of Birth:  Jose F Camacho 79 y.o. 1945    Date of Visit: January 3, 2025    SUBJECTIVE:  CC: Jose F presents today for follow up on \"it is the best of times and the worst of times\"; PTSD, anxiety symptoms     MARIO notes he is doing well, a lot of issues in the world. That builds up and weighs on him. Stayed in [place] on the " [place]shore in a condo for [place] with his wife. 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 Managing, supervising mostly, only 1 class per semester    Medications going well.     Likely will need knee replaced, some LBP but overall doing well.    Some issues with memory, does have appt next week for further testing. Working with PCP. Ongoing sensitivity to sound/light since the concussion, maybe a bit more.     Does not talk to her wife's son. He is not doing well, neither of them talk to him much. Not supporting him at all. He does not want anything to do with them. He is 41yo, functioning (in [employer]), her only child. Raised him as a single mom. Just not getting along. It is daily for Mychal. She is not seeing a therapist, 'she is very independent\" although he suggested it.    Does see granddaughter Sosa still, rebuilding their relationship with her. Maco has 2 other children (1.4yo, 3yo) and won't let them see her.    Her father  last year, so hard year for her in particular. Her mom remains independent still, but would benefit from Assisted Living. Has had falls.     F/u PRN= was thinking about retiring May 2022 and move to [place], but now that is all up in the air.  Mychal (Sebastien's wife)  Sosa (Granddaughter)  Hortencia (Sosa's mom)  Aditya (s/o to Hortencia, legal issues re: child pornography)  Maco (son)    Med Compliance: yes  HPI ROS:                      ('was' below is from prior visit)  Medication Side Effects (other than noted):  no     Depression (10 worst):  low (Was low)   Anxiety (10 worst):  low (Was low)   Hallucinations or Psychosis  no (Was no)    Safety concerns (self harm thoughts, suicidal ideation, HI, etc):  no (Was no)   Sleep: (NM = Nightmares)  ~8hr, but some urination at night, on flomax (Was good)   Energy:  good (Was good)   Appetite:  no (Was no)   Weight Change:  no      Since our last visit, overall symptoms have been unchanged.        PHQ-2/9 Depression Screening                 Jose F denies any " "side effects from medications unless noted above    Review Of Systems as noted above. Otherwise A relevant review of symptoms was otherwise negative    History Review: The following portions of the patient's history were reviewed and documented: allergies, current medications, past family history, past medical history, past social history, and problem list.     Lab Review: Labs were reviewed and discussed with patient      OBJECTIVE:     MENTAL STATUS EXAM  Appearance:  age appropriate   Behavior:  pleasant, cooperative, with good eye contact   Speech:  Normal volume, regular rate and rhythm   Mood:  euthymic   Affect:  mood congruent   Language: intact and appropriate for age, education, and intellect   Thought Process:  Linear and goal directed   Associations: intact associations   Thought Content:  normal and appropriate   Perceptual Disturbances: no auditory or visual hallcunations   Risk Potential / Abnormal Thoughts: Suicidal ideation - None  Homicidal ideation - None  Potential for aggression - No       Consciousness:  Alert & Awake   Sensorium:  Grossly oriented   Attention: attention span and concentration are age appropriate       Fund of Knowledge:  Memory: awareness of current events: yes  recent and remote memory grossly intact   Insight:  good   Judgment: good   Muscle Strength Muscle Tone:      Gait/Station:    Motor Activity:        Risks, Benefits And Possible Side Effects Of Medications:    AGREE: Risks, benefits, and possible side effects of medications explained to Jose F and he (or legal representative) verbalizes understanding and agreement for treatment.    Controlled Medication Discussion:     Not applicable  _____________________________________________________________          Psychiatric History  Sebastien has never been hospitalized for mental health had suicide attempts of harm or homicidal ideation or violence towards others.     Social History:  Patient was raised in a \"tightknit\" family. He " was only child but had a lot of relatives. No physical or sexual abuse or other trauma history. He developed normally. He is Sic[place]. 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 He also is a . He considers both equally important in his professional life. He is  to his wife's onto and has a son who is not biological but his wife's son. He considers him his son and he is currently in the [employer]. He lives alone with his wife. Good support system. He is rooming Denominational. No  history or legal issues. His wife does have a gun at home.     He does not use tobacco, has 2 cups of coffee typically per day, and only drinks socially on the weekends. He has no history of substance use and no history of rehabilitation     Family Psychiatric History:   Family History    1. Denied: Family history of substance abuse   2. Denied: Family history of suicide   3. Denied: FH: mental illness      Assessment/Plan:        Assessment & Plan  PTSD (post-traumatic stress disorder)  Adequately managed, continue prozac. D/C to PCP       MDD (recurrent major depressive disorder) in remission (HCC)  Adequately managed, continue prozac. D/C to PCP               ______________________________________________________________________    PTSD (unbrella to L temple)  MDD  Adjustment d/o with anxiety (RESOLVED 4/18/2024)     Sebastien is doing well. Will get future refills from his PCP. D/C if he does not reach back out within the year. Following PCP with [facility] (Memory concerns, vision loss transient in one eye after which he did see ophthalmologist and was cleared, med management transition). MOCA 28/30 11/26/2021. TSH, CBC, CMP done in 11/2022 unremarkable. He had neuropsych testing done in past, he thinks from [facility].    Safety Risk Assessment: See above; Did have previous fleeting SI about OD on prozc, no intent, easily controlled.  Existential questioning  with grief, life transitions. Good support, no h/o suicide attempt, good protective factors, does have medical issues, difficult transitions, and other risk considerations.. In considering risk and protective factors (including guilt about idea of hurting his family), suicide risk and safety risk is low presently.   Given Crisis number, suicide hotline      Confidential Assessment:  prozac  abilify  Prazosin (did not seem effective at 1-2mg/ low dose only)  Clonidine  Adderall (he did not feel good on this; 8/25/2017 H&P)  Memantine  lorazepam  Amitriptyline - 10mg Neuro  STOPPED Trazodone 25-50mg HS to PRN (1/3/2025)    Scales:       Treatment Plan:      Patient has been educated about their diagnosis and treatment modalities. They voiced understanding and agreement with the following plan:     1) medication:Discussed medications and if treatment adjustment was needed/desired.   - Prozac to 20mg daily (4/18/2024)   - STOPPED Trazodone 25-50mg HS to PRN (1/3/2025)    2) labs:    - 5/2018 TG, HDL, WNL, Glucose 90   - 8/2017: CBC and CMP within normal limits, EKG  and normal sinus rhythm    - 9/2016 TSH 1.556       3) therapy:   - PRN     4) medical: Postconcussive syndrome, concussion September 2016 from a restaurant umbrella hitting him and his left temple area. 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 Being that he was an only son he had to take care of his father and this was difficult. He lost his mom prior to his father   - Grandmother passing (open casket) when he was 10, a lot of death  in his life.    - Sebastien's bio son Maco in [place], Pt raised Sosa ([employer]), a lot of stressors related. Custody issues   - MI end of July 2018     6) follow up: Discharge back to PCP     7) Treatment plan: Enacted 10/24/2017, renewed 5/1/18, 8/6/2018, 4/5/2019, Computer system went down so could not complete treatment plan on 11/8/2019,  1/21/2020, 2/10/2023, None- D/C Back to PCP       Discussed self monitoring of symptoms, and symptom monitoring tools.    Patient has been informed of 24 hours and weekend coverage for urgent situations accessed by calling the main clinic phone number.         Psychotherapy in session:  Time spent performing psychotherapy: 20 Minutes supportive therapy mostly surrounding lifestyle, self care, life fulfillment, world stressors, wife and her struggles, aging

## 2025-01-23 ENCOUNTER — OFFICE VISIT (OUTPATIENT)
Dept: OBGYN CLINIC | Facility: CLINIC | Age: 80
End: 2025-01-23
Payer: COMMERCIAL

## 2025-01-23 VITALS — HEIGHT: 70 IN | BODY MASS INDEX: 28.63 KG/M2 | WEIGHT: 200 LBS

## 2025-01-23 DIAGNOSIS — M23.203 DEGENERATIVE TEAR OF MEDIAL MENISCUS OF RIGHT KNEE: ICD-10-CM

## 2025-01-23 DIAGNOSIS — M25.561 CHRONIC PAIN OF RIGHT KNEE: ICD-10-CM

## 2025-01-23 DIAGNOSIS — M17.11 PRIMARY OSTEOARTHRITIS OF RIGHT KNEE: Primary | ICD-10-CM

## 2025-01-23 DIAGNOSIS — G89.29 CHRONIC PAIN OF RIGHT KNEE: ICD-10-CM

## 2025-01-23 PROCEDURE — 99203 OFFICE O/P NEW LOW 30 MIN: CPT | Performed by: ORTHOPAEDIC SURGERY

## 2025-01-23 NOTE — PROGRESS NOTES
Assessment:       1. Primary osteoarthritis of right knee    2. Chronic pain of right knee    3. 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  We discussed continued potential nonsurgical management options including corticosteroid injections, viscosupplementation injections and PRP injection.  Patient was explained that if he wishes to consider PRP injection he should first discuss this with his cardiologist since I do recommend him to withhold his aspirin for about 2 weeks prior to and after the PRP injection.  He was also explained that the definitive management of degenerative knee osteoarthritis would be a knee replacement arthroplasty.  However, patient currently reports to have satisfactory pain control following the viscosupplementation injection and would like to either call us back or his orthopedic provider if symptoms recur.            Subjective:     Patient ID: Jose F Camacho is a 79 y.o. male.    Chief Complaint:    ANMOL Kohler is a 79-year-old gentleman who presents today for evaluation of right knee pain.  Patient has long history of right knee pain with known osteoarthritis and medial meniscus tear.  He has been treated conservatively externally at [facility] through intra-articular cortisone injection and viscosupplementation injection as well as a pes anserine bursa injection.  Does have a known history of right knee arthroscopy over a decade ago.  Most recently, patient has had a right knee Euflexxa injection about a month ago and reports that this has benefited his right knee pain.  He does use a cane for ambulatory assistance.  He currently denies any locking episodes of the right knee.  No reported significant right knee instability at this time.     Social History     Occupational History    Not on file   Tobacco  "Use    Smoking status: Never    Smokeless tobacco: Never   Substance and Sexual Activity    Alcohol use: Yes     Comment: 1 bottle of wine a week    Drug use: No    Sexual activity: Yes     Partners: Female      Review of Systems        Objective:     Right Knee Exam     Tenderness   The patient is experiencing tenderness in the medial joint line (Patellofemoral crepitus with some tenderness).    Range of Motion   Extension:  normal   Flexion:  130     Tests   Lachman:  Anterior - negative      Drawer:  Anterior - negative    Posterior - negative  Patellar apprehension: negative    Other   Erythema: absent  Effusion: no effusion present    Comments:  Mild laxity only with valgus stress testing without discomfort.  Negative varus stress testing.  Equivocal medial Rock's and negative lateral Rock's.          Observations     Right Knee   Negative for effusion.   Physical Exam  Musculoskeletal:      Right knee: No effusion.           MRI of the right knee performed externally on 10/18/2024 has been reported as:    \"1.  Complex degenerative tears posterior root medial meniscus superimposed on   degeneration. 2 to 3 mm extrusion into medial gutter. No displaced flap.     2.  Small areas of near full to full-thickness cartilage defects within   weightbearing medial femoral condyle and opposing medial tibial plateau   cartilage with mild subchondral edema superimposed on at least moderate grade   diffuse cartilage loss.     3.  Moderate diffuse cartilage loss within patellofemoral compartment with   surface fibrillation.     4.  Moderate-sized knee joint effusion and tiny Baker's cyst formation.     5.  Significant Findings: [place] Act 112. \"  "

## 2025-03-10 ENCOUNTER — EVALUATION (OUTPATIENT)
Dept: PHYSICAL THERAPY | Facility: CLINIC | Age: 80
End: 2025-03-10
Payer: COMMERCIAL

## 2025-03-10 DIAGNOSIS — M25.561 CHRONIC PAIN OF RIGHT KNEE: Primary | ICD-10-CM

## 2025-03-10 DIAGNOSIS — G89.29 CHRONIC PAIN OF RIGHT KNEE: Primary | ICD-10-CM

## 2025-03-10 DIAGNOSIS — M48.061 LUMBAR FORAMINAL STENOSIS: ICD-10-CM

## 2025-03-10 PROCEDURE — 97530 THERAPEUTIC ACTIVITIES: CPT

## 2025-03-10 PROCEDURE — 97161 PT EVAL LOW COMPLEX 20 MIN: CPT

## 2025-03-10 PROCEDURE — 97110 THERAPEUTIC EXERCISES: CPT

## 2025-03-10 NOTE — PROGRESS NOTES
PT Evaluation     Today's date: 3/10/2025  Patient name: Jose F Camacho  : 1945  MRN: [id number]  Referring provider: Al Stokes MD  Dx:   Encounter Diagnosis     ICD-10-CM    1. Chronic pain of right knee  M25.561     G89.29       2. Lumbar foraminal stenosis  M48.061           Start Time: 1530  Stop Time: 1630  Total time in clinic (min): 60 minutes    Assessment  Impairments: abnormal movement, activity intolerance, impaired physical strength, lacks appropriate home exercise program, pain with function, poor posture , poor body mechanics, participation limitations, activity limitations and endurance  Symptom irritability: low    Assessment details: Jose F Camacho is a 79 y.o. year old male presenting with CC of chronic R knee pain with recent exacerbation occurring about 6 months ago. Patients primary impairments include decreased LE strength, decreased tolerance for eccentric loading, and limitation d/t sx irritability. Noted impairments limit the patients ability to participate in functional ADLs, ambulate extended periods without need for SPC, and ascend/descend stairs without limitation d/t discomfort and fear of sx irritability. Patient would benefit from skilled PT to address noted impairments and promote return to PLOF. PT reviewed HEP with the patient where they demonstrated a good understanding of technique and dosage, and patient was instructed to discontinue exercises should symptoms be exacerbated. No additional referral necessary at this time.    Understanding of Dx/Px/POC: good     Prognosis: good    Goals  Short Terms Goals: (4-6 weeks)  Patient will demonstrate at least 4+/5 MMT strength in the RLE to assist with functional ADLs.  Patient will be independent with HEP    Long Term Goals: (8 weeks)  Patient will be able to perform 5xSTS in 12s or less without use of UE assist.  Patient will be able to ambulate at least 30 minutes without limitation d/t sx irritability  >2/10.  Patient will return to all recreational activities without restriction.  FOTO score will be greater than or equal to predicted value.     Plan  Patient would benefit from: skilled physical therapy  Planned modality interventions: cryotherapy, neuromuscular electric stimulation, TENS and thermotherapy: hydrocollator packs    Planned therapy interventions: abdominal trunk stabilization, activity modification, joint mobilization, IASTM, manual therapy, kinesiology taping, massage, Noel taping, balance, balance/weight bearing training, behavior modification, body mechanics training, nerve gliding, neuromuscular re-education, patient/caregiver education, postural training, strengthening, stretching, flexibility, functional ROM exercises, therapeutic activities, therapeutic exercise, home exercise program and IADL retraining    Frequency: 1-2x week  Duration in weeks: 12  Plan of Care beginning date: 3/10/2025  Treatment plan discussed with: patient  Plan details: Thank you for referring Jose F Camacho to PT at [facility] and for the opportunity to coordinate care.       Subjective Evaluation    History of Present Illness  Mechanism of injury: Jose F Camacho is a 79 y.o. M presenting to PT [facility] with CC of chronic right knee pain for the past 15 years or so, with recent exacerbation beginning about 6 months ago. States he had a meniscus repair on the R knee years ago. Met with Neurosurgery where it was determined that his sx were radicular in nature. States that his pain lately is either on the posterior and medial R knee. Says he got a Cortizone injection in the R knee before which were typically beneficial, however the one he got most recently did not work. Says about 6 weeks ago he got a gel injection which has been very helpful. States ambulating extended distances, ascending/descending stairs, and squatting is difficult. Works as an  and professor for a college.   Quality of life:  "good    Patient Goals  Patient goals for therapy: decreased pain, increased motion, increased strength, independence with ADLs/IADLs, improved balance and return to sport/leisure activities    Pain  Current pain ratin  At best pain ratin  At worst pain rating: 3  Quality: dull ache    Social Support  1  12  Lives in: multiple-level home      Diagnostic Tests    FCE comments: MRI Lumbar Spine (24): \"IMPRESSION:     Unchanged mild lumbar spondylosis, most severe L3-4 and L4-5 as described.\"    MRI R Knee (10/18/2024): \"  Impression:     1. Complex degenerative tears posterior root medial meniscus superimposed on   degeneration. 2 to 3 mm extrusion into medial gutter. No displaced flap.     2.  Small areas of near full to full-thickness cartilage defects within   weightbearing medial femoral condyle and opposing medial tibial plateau   cartilage with mild subchondral edema superimposed on at least moderate grade   diffuse cartilage loss.     3.  Moderate diffuse cartilage loss within patellofemoral compartment with   surface fibrillation.     4.  Moderate-sized knee joint effusion and tiny Baker's cyst formation.     5.  Significant Findings: [place] Act 112.\"  Treatments  Previous treatment: injection treatment      Objective    Red Flags: DENIES    Catching/Locking, clicking: DENIES    Postural Findings: Forward head, rounded shoulders, increased t/s kyphosis, increased lumbar lordosis    Strength:    IE   Joint / Motion  Right  Left    Hip F 4 4   Hip Abd 4+ 4+   Hip Add 4+ 4+   Hip ER 4- 4-   Hip IR 4 4   Knee Ext 4- 4   Knee Flex 4 4   Ankle DF 5 5   Ankle PF 5 5     Hip ROM: WNL and pain free b/l    Knee ROM:   IE   Joint/Motion Right Left   Knee E 0,0 0,0   Knee F 127*,130* 130   Patella WNL WNL     Palpation: Denies TTP R knee    Additional Assessments:  5xSTS: 14s with b/l UE assist  DL Squat:  Gait: normal, mildly antalgic with use of SPC  Stair Climbing: Step to approach with use of SPC and handrail " as needed. Reports discomfort in LLE with eccentric loading of quadriceps         Precautions: HTN      Manuals 3/10                                                                Neuro Re-Ed                                                                                                        Ther Ex             STS HEP            Bridges HEP            Clamshell HEP            Leg Press             Bike             RDL             Standing hip abd/ext                          Ther Activity             Pt Edu HEP, POC, pgx, dgx                         Gait Training                                       Modalities

## 2025-03-14 ENCOUNTER — OFFICE VISIT (OUTPATIENT)
Dept: PHYSICAL THERAPY | Facility: CLINIC | Age: 80
End: 2025-03-14
Payer: COMMERCIAL

## 2025-03-14 DIAGNOSIS — M48.061 LUMBAR FORAMINAL STENOSIS: ICD-10-CM

## 2025-03-14 DIAGNOSIS — G89.29 CHRONIC PAIN OF RIGHT KNEE: Primary | ICD-10-CM

## 2025-03-14 DIAGNOSIS — M25.561 CHRONIC PAIN OF RIGHT KNEE: Primary | ICD-10-CM

## 2025-03-14 PROCEDURE — 97110 THERAPEUTIC EXERCISES: CPT

## 2025-03-14 PROCEDURE — 97530 THERAPEUTIC ACTIVITIES: CPT

## 2025-03-14 NOTE — PROGRESS NOTES
"Daily Note     Today's date: 3/14/2025  Patient name: Jose F Camacho  : 1945  MRN: [id number]  Referring provider: Al Stokes MD  Dx:   Encounter Diagnosis     ICD-10-CM    1. Chronic pain of right knee  M25.561     G89.29       2. Lumbar foraminal stenosis  M48.061           Start Time: 1038  Stop Time: 1117  Total time in clinic (min): 39 minutes    Subjective: Reports he had some discomfort in the back of his knee with his HEP so he stropped his exercises.       Objective: See treatment diary below      Assessment: Tolerated treatment well. Started with R. Bike. Reported some discomfort with mini squats, however pt able to reduce this by performing within modified range. Discussed benefits of PT and implications of degenerative changes in the joint and how audible sounds in the joint, if pain free or absent in the setting of trauma, are not concerning. Advised pt to continued with HEP with the except of the bridges to determine whether his posterior knee soreness persists. Patient demonstrated fatigue post treatment, exhibited good technique with therapeutic exercises, and would benefit from continued PT      Plan: Continue per plan of care.      Precautions: HTN      Manuals 3/10 3/14                                                               Neuro Re-Ed                                                                                                        Ther Ex             STS HEP 2x10           Bridges HEP            Clamshell HEP            LAQ  3x10, 2#           Leg Press, DL  3x10, 75#           Bike  5'           Mini Squat  30x           Lateral Stepping  4 passes TB NV          RDL  NT           Standing hip abd/ext  2x10, YTB           Seated hamstring stretch  4x20\" with strap           Step Up             Ther Activity             Pt Edu HEP, POC, pgx, dgx HEP, POC, anatomy                        Gait Training                                       Modalities                        "

## 2025-03-18 ENCOUNTER — OFFICE VISIT (OUTPATIENT)
Dept: PHYSICAL THERAPY | Facility: CLINIC | Age: 80
End: 2025-03-18
Payer: COMMERCIAL

## 2025-03-18 DIAGNOSIS — M25.561 CHRONIC PAIN OF RIGHT KNEE: Primary | ICD-10-CM

## 2025-03-18 DIAGNOSIS — M48.061 LUMBAR FORAMINAL STENOSIS: ICD-10-CM

## 2025-03-18 DIAGNOSIS — G89.29 CHRONIC PAIN OF RIGHT KNEE: Primary | ICD-10-CM

## 2025-03-18 PROCEDURE — 97110 THERAPEUTIC EXERCISES: CPT

## 2025-03-18 NOTE — PROGRESS NOTES
"Daily Note     Today's date: 3/18/2025  Patient name: Jose F Camacho  : 1945  MRN: [id number]  Referring provider: Al Stokes MD  Dx:   Encounter Diagnosis     ICD-10-CM    1. Chronic pain of right knee  M25.561     G89.29       2. Lumbar foraminal stenosis  M48.061             Start Time: 0930  Stop Time: 1000  Total time in clinic (min): 30 minutes    Subjective: States his knee has been feeling a bit better. Reports that he has some soreness for 2-3 hours after exercises but that otherwise he has been good.      Objective: See treatment diary below      Assessment: Tolerated treatment well. Reported some fatigue with lateral stepping and STS, where sx subsided with 1 min rest. Will assess response to increased resistance NV. Patient demonstrated fatigue post treatment, exhibited good technique with therapeutic exercises, and would benefit from continued PT.      Plan: Continue per plan of care.      Precautions: HTN      Manuals 3/10 3/14 3/18                                                              Neuro Re-Ed                                                                                                        Ther Ex             STS HEP 2x10 2x10          Bridges HEP            Clamshell HEP            LAQ  3x10, 2# 3x10, 2.5#          Leg Press, DL  3x10, 75# 3x10, 75#          Bike  5' 5'          Mini Squat  30x 30x          Lateral Stepping  4 passes 4 passes YTB          RDL  NT NV?          Standing hip abd/ext  2x10, YTB 2x10, YTB          Seated hamstring stretch  4x20\" with strap 4x20\" strap          Step Up   NV          Ther Activity             Pt Edu HEP, POC, pgx, dgx HEP, POC, anatomy                        Gait Training                                       Modalities                                            "

## 2025-03-20 ENCOUNTER — OFFICE VISIT (OUTPATIENT)
Dept: PHYSICAL THERAPY | Facility: CLINIC | Age: 80
End: 2025-03-20
Payer: COMMERCIAL

## 2025-03-20 DIAGNOSIS — M48.061 LUMBAR FORAMINAL STENOSIS: ICD-10-CM

## 2025-03-20 DIAGNOSIS — G89.29 CHRONIC PAIN OF RIGHT KNEE: Primary | ICD-10-CM

## 2025-03-20 DIAGNOSIS — M25.561 CHRONIC PAIN OF RIGHT KNEE: Primary | ICD-10-CM

## 2025-03-20 PROCEDURE — 97110 THERAPEUTIC EXERCISES: CPT

## 2025-03-20 NOTE — PROGRESS NOTES
"Daily Note     Today's date: 3/20/2025  Patient name: Jose F Camacho  : 1945  MRN: [id number]  Referring provider: Al Stokes MD  Dx:   Encounter Diagnosis     ICD-10-CM    1. Chronic pain of right knee  M25.561     G89.29       2. Lumbar foraminal stenosis  M48.061         Start Time: 0900  Stop Time: 0930  Total time in clinic (min): 30 minutes    Subjective: States his knee feels good. States he does not have much pain. Says he continues to use his cane for longer distances and on the stairs as he needs to.       Objective: See treatment diary below      Assessment: Tolerated treatment well. Patient demonstrated fatigue post treatment, exhibited good technique with therapeutic exercises, and would benefit from continued PT.      Plan: Continue per plan of care.      Precautions: HTN      Manuals 3/10 3/14 3/18 3/20                                                             Neuro Re-Ed                                                                                                        Ther Ex             STS HEP 2x10 2x10 2x10          Bridges HEP            Clamshell HEP            LAQ  3x10, 2# 3x10, 2.5# 3x10, 2.5#         Leg Press, DL  3x10, 75# 3x10, 75# 3x10, 80#         Bike  5' 5' 5'         Mini Squat  30x 30x          Lateral Stepping  4 passes 4 passes YTB 4 passesYTB         RDL  NT NV?          Standing hip abd/ext  2x10, YTB 2x10, YTB 2x10 ea YTB         Seated hamstring stretch  4x20\" with strap 4x20\" strap 4x20\" strap         Step Up   NV NV         Ther Activity             Pt Edu HEP, POC, pgx, dgx HEP, POC, anatomy                        Gait Training                                       Modalities                                            "

## 2025-03-25 ENCOUNTER — OFFICE VISIT (OUTPATIENT)
Dept: PHYSICAL THERAPY | Facility: CLINIC | Age: 80
End: 2025-03-25
Payer: COMMERCIAL

## 2025-03-25 DIAGNOSIS — M25.561 CHRONIC PAIN OF RIGHT KNEE: Primary | ICD-10-CM

## 2025-03-25 DIAGNOSIS — M48.061 LUMBAR FORAMINAL STENOSIS: ICD-10-CM

## 2025-03-25 DIAGNOSIS — G89.29 CHRONIC PAIN OF RIGHT KNEE: Primary | ICD-10-CM

## 2025-03-25 PROCEDURE — 97110 THERAPEUTIC EXERCISES: CPT

## 2025-03-25 NOTE — PROGRESS NOTES
"Daily Note     Today's date: 3/25/2025  Patient name: Jose F Camacho  : 1945  MRN: [id number]  Referring provider: Al Stokes MD  Dx:   Encounter Diagnosis     ICD-10-CM    1. Chronic pain of right knee  M25.561     G89.29       2. Lumbar foraminal stenosis  M48.061           Start Time: 0835  Stop Time: 0900  Total time in clinic (min): 25 minutes    Subjective: Reports no new changes.      Objective: See treatment diary below      Assessment: Tolerated treatment well. Incorporated step ups today with good tolerance. Patient demonstrated fatigue post treatment, exhibited good technique with therapeutic exercises, and would benefit from continued PT.      Plan: Continue per plan of care.      Precautions: HTN      Manuals 3/10 3/14 3/18 3/20 3/25                                                            Neuro Re-Ed                                                                                                        Ther Ex             STS HEP 2x10 2x10 2x10  2x10        Bridges HEP            Clamshell HEP            LAQ  3x10, 2# 3x10, 2.5# 3x10, 2.5#         Leg Press, DL  3x10, 75# 3x10, 75# 3x10, 80# 3x10, 80#        Bike  5' 5' 5' 3'        Mini Squat  30x 30x          Lateral Stepping  4 passes 4 passes YTB 4 passesYTB 4 passes RTB        RDL  NT NV?          Standing hip abd/ext  2x10, YTB 2x10, YTB 2x10 ea YTB         Seated hamstring stretch  4x20\" with strap 4x20\" strap 4x20\" strap 4x20\" incline stretch        Step Up   NV NV 2x10, 6 in        Ther Activity             Pt Edu HEP, POC, pgx, dgx HEP, POC, anatomy                        Gait Training                                       Modalities                                            "

## 2025-03-27 ENCOUNTER — OFFICE VISIT (OUTPATIENT)
Dept: PHYSICAL THERAPY | Facility: CLINIC | Age: 80
End: 2025-03-27
Payer: COMMERCIAL

## 2025-03-27 DIAGNOSIS — M25.561 CHRONIC PAIN OF RIGHT KNEE: Primary | ICD-10-CM

## 2025-03-27 DIAGNOSIS — G89.29 CHRONIC PAIN OF RIGHT KNEE: Primary | ICD-10-CM

## 2025-03-27 DIAGNOSIS — M48.061 LUMBAR FORAMINAL STENOSIS: ICD-10-CM

## 2025-03-27 PROCEDURE — 97110 THERAPEUTIC EXERCISES: CPT

## 2025-03-27 NOTE — PROGRESS NOTES
"Daily Note     Today's date: 3/27/2025  Patient name: Jose F Camacho  : 1945  MRN: [id number]  Referring provider: Al Stokes MD  Dx:   Encounter Diagnosis     ICD-10-CM    1. Chronic pain of right knee  M25.561     G89.29       2. Lumbar foraminal stenosis  M48.061             Start Time: 0930  Stop Time: 1000  Total time in clinic (min): 30 minutes    Subjective: States his knee feels a little sore. Reports that he did do a lot of dancing at a wedding recently which he thinks is likely why.      Objective: See treatment diary below      Assessment: Tolerated treatment well. Patient demonstrated fatigue post treatment, exhibited good technique with therapeutic exercises, and would benefit from continued PT.      Plan: Continue per plan of care.      Precautions: HTN      Manuals 3/10 3/14 3/18 3/20 3/25 3/27                                                           Neuro Re-Ed                                                                                                        Ther Ex             STS HEP 2x10 2x10 2x10  2x10 2x10       Bridges HEP     2x10       Clamshell HEP            LAQ  3x10, 2# 3x10, 2.5# 3x10, 2.5#         Leg Press, DL  3x10, 75# 3x10, 75# 3x10, 80# 3x10, 80# 3x10, 80#       Bike  5' 5' 5' 3' 5'       Mini Squat  30x 30x          Lateral Stepping  4 passes 4 passes YTB 4 passesYTB 4 passes RTB        RDL  NT NV?          Standing hip abd/ext  2x10, YTB 2x10, YTB 2x10 ea YTB         Seated hamstring stretch  4x20\" with strap 4x20\" strap 4x20\" strap 4x20\" incline stretch 4x20\" incline stretch       Step Up   NV NV 2x10, 6 in 2x10, 6 in       Ther Activity             Pt Edu HEP, POC, pgx, dgx HEP, POC, anatomy                        Gait Training                                       Modalities                                            "

## 2025-04-01 ENCOUNTER — OFFICE VISIT (OUTPATIENT)
Dept: PHYSICAL THERAPY | Facility: CLINIC | Age: 80
End: 2025-04-01
Payer: COMMERCIAL

## 2025-04-01 DIAGNOSIS — M48.061 LUMBAR FORAMINAL STENOSIS: ICD-10-CM

## 2025-04-01 DIAGNOSIS — M25.561 CHRONIC PAIN OF RIGHT KNEE: Primary | ICD-10-CM

## 2025-04-01 DIAGNOSIS — G89.29 CHRONIC PAIN OF RIGHT KNEE: Primary | ICD-10-CM

## 2025-04-01 PROCEDURE — 97110 THERAPEUTIC EXERCISES: CPT

## 2025-04-01 NOTE — PROGRESS NOTES
"Daily Note     Today's date: 2025  Patient name: Jose F Camacho  : 1945  MRN: [id number]  Referring provider: Al Stokes MD  Dx:   Encounter Diagnosis     ICD-10-CM    1. Chronic pain of right knee  M25.561     G89.29       2. Lumbar foraminal stenosis  M48.061               Start Time: 0835  Stop Time: 0900  Total time in clinic (min): 25 minutes    Subjective: States his R knee is feeling pretty good so far.      Objective: See treatment diary below      Assessment: Tolerated treatment well. Patient demonstrated fatigue post treatment, exhibited good technique with therapeutic exercises, and would benefit from continued PT.      Plan: Continue per plan of care.      Precautions: HTN      Manuals 3/10 3/14 3/18 3/20 3/25 3/27 4/1                                                          Neuro Re-Ed                                                                                                        Ther Ex             STS HEP 2x10 2x10 2x10  2x10 2x10 2x10      Bridges HEP     2x10 2x10, RTB      Clamshell HEP            LAQ  3x10, 2# 3x10, 2.5# 3x10, 2.5#         Leg Press, DL  3x10, 75# 3x10, 75# 3x10, 80# 3x10, 80# 3x10, 80# 3x10, 90#      Bike  5' 5' 5' 3' 5' 5'      Mini Squat  30x 30x          Lateral Stepping  4 passes 4 passes YTB 4 passesYTB 4 passes RTB  4 passes RTB      RDL  NT NV?          Standing hip abd/ext  2x10, YTB 2x10, YTB 2x10 ea YTB         Seated hamstring stretch  4x20\" with strap 4x20\" strap 4x20\" strap 4x20\" incline stretch 4x20\" incline stretch 4x20\"      Step Up   NV NV 2x10, 6 in 2x10, 6 in 2x10, 6 in      Ther Activity             Pt Edu HEP, POC, pgx, dgx HEP, POC, anatomy                        Gait Training                                       Modalities                                            "

## 2025-04-03 ENCOUNTER — APPOINTMENT (OUTPATIENT)
Dept: PHYSICAL THERAPY | Facility: CLINIC | Age: 80
End: 2025-04-03
Payer: COMMERCIAL

## 2025-04-08 ENCOUNTER — APPOINTMENT (OUTPATIENT)
Dept: PHYSICAL THERAPY | Facility: CLINIC | Age: 80
End: 2025-04-08
Payer: COMMERCIAL

## 2025-04-10 ENCOUNTER — APPOINTMENT (OUTPATIENT)
Dept: PHYSICAL THERAPY | Facility: CLINIC | Age: 80
End: 2025-04-10
Payer: COMMERCIAL